# Patient Record
Sex: FEMALE | Race: WHITE | NOT HISPANIC OR LATINO | Employment: UNEMPLOYED | URBAN - METROPOLITAN AREA
[De-identification: names, ages, dates, MRNs, and addresses within clinical notes are randomized per-mention and may not be internally consistent; named-entity substitution may affect disease eponyms.]

---

## 2017-07-17 ENCOUNTER — HOSPITAL ENCOUNTER (EMERGENCY)
Facility: HOSPITAL | Age: 37
Discharge: HOME | End: 2017-07-18
Attending: EMERGENCY MEDICINE | Admitting: EMERGENCY MEDICINE
Payer: COMMERCIAL

## 2017-07-17 DIAGNOSIS — E03.9 HYPOTHYROIDISM: ICD-10-CM

## 2017-07-17 DIAGNOSIS — R60.0 PEDAL EDEMA: Primary | ICD-10-CM

## 2017-07-18 ENCOUNTER — APPOINTMENT (EMERGENCY)
Dept: RADIOLOGY | Facility: HOSPITAL | Age: 37
End: 2017-07-18
Payer: COMMERCIAL

## 2017-07-18 VITALS
HEIGHT: 61 IN | TEMPERATURE: 98.7 F | RESPIRATION RATE: 18 BRPM | OXYGEN SATURATION: 100 % | DIASTOLIC BLOOD PRESSURE: 88 MMHG | SYSTOLIC BLOOD PRESSURE: 137 MMHG | WEIGHT: 180 LBS | BODY MASS INDEX: 33.99 KG/M2 | HEART RATE: 70 BPM

## 2017-07-18 LAB
ALBUMIN SERPL BCP-MCNC: 3.6 G/DL (ref 3.5–5)
ALP SERPL-CCNC: 72 U/L (ref 46–116)
ALT SERPL W P-5'-P-CCNC: 29 U/L (ref 12–78)
ANION GAP SERPL CALCULATED.3IONS-SCNC: 2 MMOL/L (ref 4–13)
AST SERPL W P-5'-P-CCNC: 11 U/L (ref 5–45)
BASOPHILS # BLD AUTO: 0.03 THOUSANDS/ΜL (ref 0–0.1)
BASOPHILS NFR BLD AUTO: 0 % (ref 0–1)
BILIRUB SERPL-MCNC: 0.2 MG/DL (ref 0.2–1)
BILIRUB UR QL STRIP: NEGATIVE
BUN SERPL-MCNC: 9 MG/DL (ref 5–25)
CALCIUM SERPL-MCNC: 8.9 MG/DL (ref 8.3–10.1)
CHLORIDE SERPL-SCNC: 102 MMOL/L (ref 100–108)
CLARITY UR: NORMAL
CO2 SERPL-SCNC: 26 MMOL/L (ref 21–32)
COLOR UR: YELLOW
CREAT SERPL-MCNC: 0.71 MG/DL (ref 0.6–1.3)
DEPRECATED D DIMER PPP: 534 NG/ML (FEU) (ref 0–424)
EOSINOPHIL # BLD AUTO: 0.17 THOUSAND/ΜL (ref 0–0.61)
EOSINOPHIL NFR BLD AUTO: 2 % (ref 0–6)
ERYTHROCYTE [DISTWIDTH] IN BLOOD BY AUTOMATED COUNT: 13.2 % (ref 11.6–15.1)
GFR SERPL CREATININE-BSD FRML MDRD: >60 ML/MIN/1.73SQ M
GLUCOSE SERPL-MCNC: 102 MG/DL (ref 65–140)
GLUCOSE UR STRIP-MCNC: NEGATIVE MG/DL
HCG UR QL: NORMAL
HCT VFR BLD AUTO: 40.9 % (ref 34.8–46.1)
HGB BLD-MCNC: 13.2 G/DL (ref 11.5–15.4)
HGB UR QL STRIP.AUTO: NEGATIVE
KETONES UR STRIP-MCNC: NEGATIVE MG/DL
LEUKOCYTE ESTERASE UR QL STRIP: NEGATIVE
LYMPHOCYTES # BLD AUTO: 4.1 THOUSANDS/ΜL (ref 0.6–4.47)
LYMPHOCYTES NFR BLD AUTO: 37 % (ref 14–44)
MCH RBC QN AUTO: 28 PG (ref 26.8–34.3)
MCHC RBC AUTO-ENTMCNC: 32.3 G/DL (ref 31.4–37.4)
MCV RBC AUTO: 87 FL (ref 82–98)
MONOCYTES # BLD AUTO: 0.66 THOUSAND/ΜL (ref 0.17–1.22)
MONOCYTES NFR BLD AUTO: 6 % (ref 4–12)
NEUTROPHILS # BLD AUTO: 6.24 THOUSANDS/ΜL (ref 1.85–7.62)
NEUTS SEG NFR BLD AUTO: 56 % (ref 43–75)
NITRITE UR QL STRIP: NEGATIVE
NRBC BLD AUTO-RTO: 0 /100 WBCS
NT-PROBNP SERPL-MCNC: 88 PG/ML
PH UR STRIP.AUTO: 6 [PH] (ref 4.5–8)
PLATELET # BLD AUTO: 313 THOUSANDS/UL (ref 149–390)
PMV BLD AUTO: 10.5 FL (ref 8.9–12.7)
POTASSIUM SERPL-SCNC: 3.5 MMOL/L (ref 3.5–5.3)
PROT SERPL-MCNC: 7.1 G/DL (ref 6.4–8.2)
PROT UR STRIP-MCNC: NEGATIVE MG/DL
RBC # BLD AUTO: 4.71 MILLION/UL (ref 3.81–5.12)
SODIUM SERPL-SCNC: 130 MMOL/L (ref 136–145)
SP GR UR STRIP.AUTO: >=1.03 (ref 1–1.03)
T3 SERPL-MCNC: 1.4 NG/ML (ref 0.6–1.8)
T4 FREE SERPL-MCNC: 0.85 NG/DL (ref 0.76–1.46)
TROPONIN I SERPL-MCNC: <0.02 NG/ML
TSH SERPL DL<=0.05 MIU/L-ACNC: 8.32 UIU/ML (ref 0.36–3.74)
UROBILINOGEN UR QL STRIP.AUTO: 0.2 E.U./DL
WBC # BLD AUTO: 11.23 THOUSAND/UL (ref 4.31–10.16)

## 2017-07-18 PROCEDURE — 85379 FIBRIN DEGRADATION QUANT: CPT | Performed by: EMERGENCY MEDICINE

## 2017-07-18 PROCEDURE — 93005 ELECTROCARDIOGRAM TRACING: CPT | Performed by: EMERGENCY MEDICINE

## 2017-07-18 PROCEDURE — 71020 HB CHEST X-RAY 2VW FRONTAL&LATL: CPT

## 2017-07-18 PROCEDURE — 84443 ASSAY THYROID STIM HORMONE: CPT | Performed by: EMERGENCY MEDICINE

## 2017-07-18 PROCEDURE — 80053 COMPREHEN METABOLIC PANEL: CPT | Performed by: EMERGENCY MEDICINE

## 2017-07-18 PROCEDURE — 36415 COLL VENOUS BLD VENIPUNCTURE: CPT | Performed by: EMERGENCY MEDICINE

## 2017-07-18 PROCEDURE — 81025 URINE PREGNANCY TEST: CPT | Performed by: EMERGENCY MEDICINE

## 2017-07-18 PROCEDURE — 85025 COMPLETE CBC W/AUTO DIFF WBC: CPT | Performed by: EMERGENCY MEDICINE

## 2017-07-18 PROCEDURE — 84480 ASSAY TRIIODOTHYRONINE (T3): CPT | Performed by: EMERGENCY MEDICINE

## 2017-07-18 PROCEDURE — 84439 ASSAY OF FREE THYROXINE: CPT | Performed by: EMERGENCY MEDICINE

## 2017-07-18 PROCEDURE — 81003 URINALYSIS AUTO W/O SCOPE: CPT | Performed by: EMERGENCY MEDICINE

## 2017-07-18 PROCEDURE — 84484 ASSAY OF TROPONIN QUANT: CPT | Performed by: EMERGENCY MEDICINE

## 2017-07-18 PROCEDURE — 99284 EMERGENCY DEPT VISIT MOD MDM: CPT

## 2017-07-18 PROCEDURE — 83880 ASSAY OF NATRIURETIC PEPTIDE: CPT | Performed by: EMERGENCY MEDICINE

## 2017-07-18 RX ORDER — LEVOTHYROXINE SODIUM 137 UG/1
137 TABLET ORAL DAILY
Qty: 30 TABLET | Refills: 0 | Status: SHIPPED | OUTPATIENT
Start: 2017-07-18 | End: 2018-10-12 | Stop reason: HOSPADM

## 2017-07-18 RX ORDER — GINSENG 100 MG
1 CAPSULE ORAL ONCE
Status: DISCONTINUED | OUTPATIENT
Start: 2017-07-18 | End: 2017-07-18

## 2017-07-18 RX ORDER — POTASSIUM CHLORIDE 750 MG/1
10 TABLET, FILM COATED, EXTENDED RELEASE ORAL 2 TIMES DAILY
Qty: 20 TABLET | Refills: 0 | Status: SHIPPED | OUTPATIENT
Start: 2017-07-18 | End: 2018-07-17 | Stop reason: HOSPADM

## 2017-07-18 RX ORDER — FUROSEMIDE 20 MG/1
20 TABLET ORAL DAILY
Qty: 10 TABLET | Refills: 0 | Status: ON HOLD | OUTPATIENT
Start: 2017-07-18 | End: 2017-10-07

## 2017-07-18 RX ORDER — CEPHALEXIN 250 MG/5ML
500 POWDER, FOR SUSPENSION ORAL ONCE
Status: DISCONTINUED | OUTPATIENT
Start: 2017-07-18 | End: 2017-07-18

## 2017-07-21 ENCOUNTER — HOSPITAL ENCOUNTER (OUTPATIENT)
Dept: ULTRASOUND IMAGING | Facility: HOSPITAL | Age: 37
Discharge: HOME | End: 2017-07-21
Attending: EMERGENCY MEDICINE
Payer: COMMERCIAL

## 2017-07-21 DIAGNOSIS — R60.0 PEDAL EDEMA: ICD-10-CM

## 2017-07-21 PROCEDURE — 93970 EXTREMITY STUDY: CPT

## 2017-07-22 LAB
ATRIAL RATE: 69 BPM
P AXIS: 65 DEGREES
PR INTERVAL: 150 MS
QRS AXIS: 88 DEGREES
QRSD INTERVAL: 66 MS
QT INTERVAL: 410 MS
QTC INTERVAL: 439 MS
T WAVE AXIS: 79 DEGREES
VENTRICULAR RATE: 69 BPM

## 2017-10-07 ENCOUNTER — APPOINTMENT (EMERGENCY)
Dept: CT IMAGING | Facility: HOSPITAL | Age: 37
DRG: 058 | End: 2017-10-07
Payer: COMMERCIAL

## 2017-10-07 ENCOUNTER — HOSPITAL ENCOUNTER (INPATIENT)
Facility: HOSPITAL | Age: 37
LOS: 3 days | Discharge: HOME | DRG: 058 | End: 2017-10-10
Attending: EMERGENCY MEDICINE | Admitting: FAMILY MEDICINE
Payer: COMMERCIAL

## 2017-10-07 DIAGNOSIS — R20.0 LEFT SIDED NUMBNESS: Primary | ICD-10-CM

## 2017-10-07 DIAGNOSIS — I67.5 MOYAMOYA DISEASE: ICD-10-CM

## 2017-10-07 DIAGNOSIS — Z86.73 HISTORY OF CVA (CEREBROVASCULAR ACCIDENT): ICD-10-CM

## 2017-10-07 PROBLEM — Z72.0 TOBACCO USE: Status: ACTIVE | Noted: 2017-10-07

## 2017-10-07 PROBLEM — D72.829 LEUKOCYTOSIS: Status: ACTIVE | Noted: 2017-10-07

## 2017-10-07 LAB
ALBUMIN SERPL BCP-MCNC: 3.9 G/DL (ref 3.5–5)
ALP SERPL-CCNC: 93 U/L (ref 46–116)
ALT SERPL W P-5'-P-CCNC: 23 U/L (ref 12–78)
ANION GAP SERPL CALCULATED.3IONS-SCNC: 12 MMOL/L (ref 4–13)
AST SERPL W P-5'-P-CCNC: 11 U/L (ref 5–45)
BACTERIA UR QL AUTO: ABNORMAL /HPF
BASOPHILS # BLD AUTO: 0.04 THOUSANDS/ΜL (ref 0–0.1)
BASOPHILS NFR BLD AUTO: 0 % (ref 0–1)
BILIRUB SERPL-MCNC: 0.3 MG/DL (ref 0.2–1)
BILIRUB UR QL STRIP: ABNORMAL
BUN SERPL-MCNC: 9 MG/DL (ref 5–25)
CALCIUM SERPL-MCNC: 9 MG/DL (ref 8.3–10.1)
CHLORIDE SERPL-SCNC: 102 MMOL/L (ref 100–108)
CLARITY UR: CLEAR
CO2 SERPL-SCNC: 25 MMOL/L (ref 21–32)
COLOR UR: ABNORMAL
CREAT SERPL-MCNC: 0.83 MG/DL (ref 0.6–1.3)
EOSINOPHIL # BLD AUTO: 0.16 THOUSAND/ΜL (ref 0–0.61)
EOSINOPHIL NFR BLD AUTO: 1 % (ref 0–6)
ERYTHROCYTE [DISTWIDTH] IN BLOOD BY AUTOMATED COUNT: 12.8 % (ref 11.6–15.1)
GFR SERPL CREATININE-BSD FRML MDRD: 89 ML/MIN/1.73SQ M
GLUCOSE SERPL-MCNC: 139 MG/DL (ref 65–140)
GLUCOSE UR STRIP-MCNC: NEGATIVE MG/DL
HCT VFR BLD AUTO: 42.1 % (ref 34.8–46.1)
HGB BLD-MCNC: 13.8 G/DL (ref 11.5–15.4)
HGB UR QL STRIP.AUTO: ABNORMAL
INR PPP: 0.89 (ref 0.86–1.16)
KETONES UR STRIP-MCNC: ABNORMAL MG/DL
LEUKOCYTE ESTERASE UR QL STRIP: NEGATIVE
LYMPHOCYTES # BLD AUTO: 4.33 THOUSANDS/ΜL (ref 0.6–4.47)
LYMPHOCYTES NFR BLD AUTO: 38 % (ref 14–44)
MCH RBC QN AUTO: 28 PG (ref 26.8–34.3)
MCHC RBC AUTO-ENTMCNC: 32.8 G/DL (ref 31.4–37.4)
MCV RBC AUTO: 86 FL (ref 82–98)
MONOCYTES # BLD AUTO: 0.41 THOUSAND/ΜL (ref 0.17–1.22)
MONOCYTES NFR BLD AUTO: 4 % (ref 4–12)
NEUTROPHILS # BLD AUTO: 6.3 THOUSANDS/ΜL (ref 1.85–7.62)
NEUTS SEG NFR BLD AUTO: 56 % (ref 43–75)
NITRITE UR QL STRIP: NEGATIVE
NON-SQ EPI CELLS URNS QL MICRO: ABNORMAL /HPF
NRBC BLD AUTO-RTO: 0 /100 WBCS
PH UR STRIP.AUTO: 5.5 [PH] (ref 4.5–8)
PLATELET # BLD AUTO: 379 THOUSANDS/UL (ref 149–390)
PMV BLD AUTO: 10.3 FL (ref 8.9–12.7)
POTASSIUM SERPL-SCNC: 3.6 MMOL/L (ref 3.5–5.3)
PROT SERPL-MCNC: 7.5 G/DL (ref 6.4–8.2)
PROT UR STRIP-MCNC: ABNORMAL MG/DL
PROTHROMBIN TIME: 12.2 SECONDS (ref 12.1–14.4)
RBC # BLD AUTO: 4.92 MILLION/UL (ref 3.81–5.12)
RBC #/AREA URNS AUTO: ABNORMAL /HPF
SODIUM SERPL-SCNC: 139 MMOL/L (ref 136–145)
SP GR UR STRIP.AUTO: >=1.03 (ref 1–1.03)
UROBILINOGEN UR QL STRIP.AUTO: 0.2 E.U./DL
WBC # BLD AUTO: 11.29 THOUSAND/UL (ref 4.31–10.16)
WBC #/AREA URNS AUTO: ABNORMAL /HPF

## 2017-10-07 PROCEDURE — 99285 EMERGENCY DEPT VISIT HI MDM: CPT

## 2017-10-07 PROCEDURE — 80053 COMPREHEN METABOLIC PANEL: CPT | Performed by: EMERGENCY MEDICINE

## 2017-10-07 PROCEDURE — 36415 COLL VENOUS BLD VENIPUNCTURE: CPT | Performed by: EMERGENCY MEDICINE

## 2017-10-07 PROCEDURE — 70496 CT ANGIOGRAPHY HEAD: CPT

## 2017-10-07 PROCEDURE — 81001 URINALYSIS AUTO W/SCOPE: CPT | Performed by: EMERGENCY MEDICINE

## 2017-10-07 PROCEDURE — 85610 PROTHROMBIN TIME: CPT | Performed by: EMERGENCY MEDICINE

## 2017-10-07 PROCEDURE — 70498 CT ANGIOGRAPHY NECK: CPT

## 2017-10-07 PROCEDURE — 93005 ELECTROCARDIOGRAM TRACING: CPT | Performed by: EMERGENCY MEDICINE

## 2017-10-07 PROCEDURE — 85025 COMPLETE CBC W/AUTO DIFF WBC: CPT | Performed by: EMERGENCY MEDICINE

## 2017-10-07 RX ORDER — PAROXETINE HYDROCHLORIDE 20 MG/1
20 TABLET, FILM COATED ORAL DAILY
Status: DISCONTINUED | OUTPATIENT
Start: 2017-10-08 | End: 2017-10-10 | Stop reason: HOSPADM

## 2017-10-07 RX ORDER — GABAPENTIN 300 MG/1
300 CAPSULE ORAL 3 TIMES DAILY
Status: DISCONTINUED | OUTPATIENT
Start: 2017-10-07 | End: 2017-10-10 | Stop reason: HOSPADM

## 2017-10-07 RX ORDER — NICOTINE 21 MG/24HR
1 PATCH, TRANSDERMAL 24 HOURS TRANSDERMAL DAILY
Status: DISCONTINUED | OUTPATIENT
Start: 2017-10-08 | End: 2017-10-08

## 2017-10-07 RX ORDER — DOXEPIN HYDROCHLORIDE 25 MG/1
25 CAPSULE ORAL
Status: DISCONTINUED | OUTPATIENT
Start: 2017-10-07 | End: 2017-10-10 | Stop reason: HOSPADM

## 2017-10-07 RX ORDER — ATORVASTATIN CALCIUM 40 MG/1
40 TABLET, FILM COATED ORAL EVERY EVENING
Status: DISCONTINUED | OUTPATIENT
Start: 2017-10-07 | End: 2017-10-10 | Stop reason: HOSPADM

## 2017-10-07 RX ORDER — ASPIRIN AND DIPYRIDAMOLE 25; 200 MG/1; MG/1
1 CAPSULE, EXTENDED RELEASE ORAL EVERY 12 HOURS SCHEDULED
Status: DISCONTINUED | OUTPATIENT
Start: 2017-10-07 | End: 2017-10-10 | Stop reason: HOSPADM

## 2017-10-07 RX ORDER — ACETAMINOPHEN 325 MG/1
650 TABLET ORAL EVERY 4 HOURS PRN
Status: DISCONTINUED | OUTPATIENT
Start: 2017-10-07 | End: 2017-10-10 | Stop reason: HOSPADM

## 2017-10-07 RX ADMIN — IOHEXOL 85 ML: 350 INJECTION, SOLUTION INTRAVENOUS at 18:31

## 2017-10-07 NOTE — ED PROVIDER NOTES
History  Chief Complaint   Patient presents with    CVA/TIA-like Symptoms     Pt to ED with complaints of numbess on left side of face/ left sided weakness  States has a history of  Vic Griffin  Symptoms started around 10 pm last night  73-year-old female patient presents emergency department chief complaint of left-sided weakness  The patient states she has a history of moyamoya disease, has had revascularization done, and started having symptoms sometime last night but wanted to nap with her boyfriend prior to coming to the emergency department so stayed home until this afternoon and then took several more hours to get a ride here to the emergency department for evaluation  Currently, physical exam, the patient has no abnormalities  The patient's sensation of weakness is not replicated the patient's neurologic exam   The patient's NIH stroke scale is 0  The patient's right scale 0 as well with no facial palsy, no are motor impairment, no leg motor impairment, no head or gaze deviation  Patient also has no hemiparesis  Patient be evaluated with a differential diagnosis to include but not be limited to TIA, CVA, myalgias  History provided by:  Patient   used: No    CVA/TIA-like Symptoms   Presenting symptoms: incoordination    Onset quality:  Gradual  Duration:  18 hours  Timing:  Constant  Progression:  Worsening  Similar to previous episodes: yes    Associated symptoms: no chest pain, no trouble swallowing, no dizziness, no fever, no hearing loss, no bladder incontinence, no paresthesias, no seizures and no tinnitus        Prior to Admission Medications   Prescriptions Last Dose Informant Patient Reported?  Taking?   furosemide (LASIX) 20 mg tablet   No No   Sig: Take 1 tablet by mouth daily   levothyroxine 137 mcg tablet   No No   Sig: Take 1 tablet by mouth daily   potassium chloride (K-DUR) 10 mEq tablet   No No   Sig: Take 1 tablet by mouth 2 (two) times a day Facility-Administered Medications: None       History reviewed  No pertinent past medical history  Past Surgical History:   Procedure Laterality Date    BACK SURGERY         History reviewed  No pertinent family history  I have reviewed and agree with the history as documented  Social History   Substance Use Topics    Smoking status: Never Smoker    Smokeless tobacco: Never Used    Alcohol use No        Review of Systems   Constitutional: Negative for fever  HENT: Negative for hearing loss, tinnitus and trouble swallowing  Cardiovascular: Negative for chest pain  Genitourinary: Negative for bladder incontinence  Neurological: Positive for disturbances in coordination  Negative for dizziness, seizures and paresthesias  All other systems reviewed and are negative  Physical Exam  ED Triage Vitals [10/07/17 1731]   Temp Pulse Respirations Blood Pressure SpO2   -- 87 18 141/93 100 %      Temp Source Heart Rate Source Patient Position - Orthostatic VS BP Location FiO2 (%)   Oral Monitor Lying Right arm --      Pain Score       No Pain           Physical Exam   Constitutional: She is oriented to person, place, and time  She appears well-developed and well-nourished  HENT:   Head: Normocephalic and atraumatic  Right Ear: External ear normal    Left Ear: External ear normal    Eyes: Conjunctivae and EOM are normal    Neck: No JVD present  No tracheal deviation present  No thyromegaly present  Cardiovascular: Normal rate  Pulmonary/Chest: Effort normal and breath sounds normal  No stridor  Abdominal: Soft  She exhibits no distension and no mass  There is no tenderness  There is no guarding  No hernia  Musculoskeletal: Normal range of motion  She exhibits no edema, tenderness or deformity  Lymphadenopathy:     She has no cervical adenopathy  Neurological: She is alert and oriented to person, place, and time  Skin: Skin is warm  No rash noted  No erythema  No pallor  Psychiatric: She has a normal mood and affect  Her behavior is normal    Nursing note and vitals reviewed  ED Medications  Medications - No data to display    Diagnostic Studies  Labs Reviewed - No data to display    No orders to display       Procedures  Procedures      Phone Contacts  ED Phone Contact    ED Course  ED Course                                MDM  Number of Diagnoses or Management Options  Left sided numbness: new and requires workup     Amount and/or Complexity of Data Reviewed  Clinical lab tests: reviewed and ordered  Tests in the radiology section of CPT®: ordered and reviewed  Decide to obtain previous medical records or to obtain history from someone other than the patient: yes  Review and summarize past medical records: yes    Patient Progress  Patient progress: stable    The patient presented with a condition in which there was a high probability of imminent or life-threatening deterioration, and critical care services (excluding separately billable procedures) totalled 30-74 minutes  Disposition  Final diagnoses:   None     ED Disposition     None      Follow-up Information    None       Patient's Medications   Discharge Prescriptions    No medications on file     No discharge procedures on file      ED Provider  Electronically Signed by       Arianne Coleman, DO  10/09/17 [address] K[address], DO  10/09/17 2204

## 2017-10-07 NOTE — ED NOTES
Pt currently on her menses  MD notified         Ace Herman, RN  10/07/17 [address] [place] [address] Brett Ch RN  10/07/17 Cristina Bowling

## 2017-10-08 ENCOUNTER — APPOINTMENT (INPATIENT)
Dept: NON INVASIVE DIAGNOSTICS | Facility: HOSPITAL | Age: 37
DRG: 058 | End: 2017-10-08
Payer: COMMERCIAL

## 2017-10-08 ENCOUNTER — APPOINTMENT (INPATIENT)
Dept: MRI IMAGING | Facility: HOSPITAL | Age: 37
DRG: 058 | End: 2017-10-08
Payer: COMMERCIAL

## 2017-10-08 ENCOUNTER — APPOINTMENT (INPATIENT)
Dept: RADIOLOGY | Facility: HOSPITAL | Age: 37
DRG: 058 | End: 2017-10-08
Payer: COMMERCIAL

## 2017-10-08 LAB
ANION GAP SERPL CALCULATED.3IONS-SCNC: 10 MMOL/L (ref 4–13)
APTT PPP: 27 SECONDS (ref 23–35)
BUN SERPL-MCNC: 9 MG/DL (ref 5–25)
CALCIUM SERPL-MCNC: 9 MG/DL (ref 8.3–10.1)
CHLORIDE SERPL-SCNC: 103 MMOL/L (ref 100–108)
CHOLEST SERPL-MCNC: 253 MG/DL (ref 50–200)
CO2 SERPL-SCNC: 26 MMOL/L (ref 21–32)
CREAT SERPL-MCNC: 0.73 MG/DL (ref 0.6–1.3)
CRP SERPL QL: 6.6 MG/L
ERYTHROCYTE [DISTWIDTH] IN BLOOD BY AUTOMATED COUNT: 12.7 % (ref 11.6–15.1)
EST. AVERAGE GLUCOSE BLD GHB EST-MCNC: 131 MG/DL
GFR SERPL CREATININE-BSD FRML MDRD: 104 ML/MIN/1.73SQ M
GLUCOSE SERPL-MCNC: 116 MG/DL (ref 65–140)
HBA1C MFR BLD: 6.2 % (ref 4.2–6.3)
HCT VFR BLD AUTO: 38.6 % (ref 34.8–46.1)
HCYS SERPL-SCNC: 8 UMOL/L (ref 3.7–11.2)
HDLC SERPL-MCNC: 26 MG/DL (ref 40–60)
HGB BLD-MCNC: 12.7 G/DL (ref 11.5–15.4)
INR PPP: 0.89 (ref 0.86–1.16)
LDLC SERPL CALC-MCNC: 186 MG/DL (ref 0–100)
MCH RBC QN AUTO: 28 PG (ref 26.8–34.3)
MCHC RBC AUTO-ENTMCNC: 32.9 G/DL (ref 31.4–37.4)
MCV RBC AUTO: 85 FL (ref 82–98)
PLATELET # BLD AUTO: 315 THOUSANDS/UL (ref 149–390)
PMV BLD AUTO: 10.1 FL (ref 8.9–12.7)
POTASSIUM SERPL-SCNC: 3.6 MMOL/L (ref 3.5–5.3)
PROTHROMBIN TIME: 12.2 SECONDS (ref 12.1–14.4)
RBC # BLD AUTO: 4.53 MILLION/UL (ref 3.81–5.12)
SODIUM SERPL-SCNC: 139 MMOL/L (ref 136–145)
TRIGL SERPL-MCNC: 206 MG/DL
TSH SERPL DL<=0.05 MIU/L-ACNC: 4.8 UIU/ML (ref 0.36–3.74)
WBC # BLD AUTO: 11.98 THOUSAND/UL (ref 4.31–10.16)

## 2017-10-08 PROCEDURE — 86140 C-REACTIVE PROTEIN: CPT | Performed by: PHYSICIAN ASSISTANT

## 2017-10-08 PROCEDURE — 80048 BASIC METABOLIC PNL TOTAL CA: CPT | Performed by: PHYSICIAN ASSISTANT

## 2017-10-08 PROCEDURE — 85027 COMPLETE CBC AUTOMATED: CPT | Performed by: PHYSICIAN ASSISTANT

## 2017-10-08 PROCEDURE — 93306 TTE W/DOPPLER COMPLETE: CPT

## 2017-10-08 PROCEDURE — 83036 HEMOGLOBIN GLYCOSYLATED A1C: CPT | Performed by: PHYSICIAN ASSISTANT

## 2017-10-08 PROCEDURE — 80061 LIPID PANEL: CPT | Performed by: PHYSICIAN ASSISTANT

## 2017-10-08 PROCEDURE — 83090 ASSAY OF HOMOCYSTEINE: CPT | Performed by: PHYSICIAN ASSISTANT

## 2017-10-08 PROCEDURE — 70551 MRI BRAIN STEM W/O DYE: CPT

## 2017-10-08 PROCEDURE — 85730 THROMBOPLASTIN TIME PARTIAL: CPT | Performed by: PHYSICIAN ASSISTANT

## 2017-10-08 PROCEDURE — 84443 ASSAY THYROID STIM HORMONE: CPT | Performed by: PHYSICIAN ASSISTANT

## 2017-10-08 PROCEDURE — 71020 HB CHEST X-RAY 2VW FRONTAL&LATL: CPT

## 2017-10-08 PROCEDURE — 85610 PROTHROMBIN TIME: CPT | Performed by: PHYSICIAN ASSISTANT

## 2017-10-08 RX ORDER — NICOTINE 21 MG/24HR
1 PATCH, TRANSDERMAL 24 HOURS TRANSDERMAL DAILY
Status: DISCONTINUED | OUTPATIENT
Start: 2017-10-08 | End: 2017-10-10 | Stop reason: HOSPADM

## 2017-10-08 RX ADMIN — ASPIRIN AND DIPYRIDAMOLE 1 CAPSULE: 25; 200 CAPSULE, EXTENDED RELEASE ORAL at 00:17

## 2017-10-08 RX ADMIN — PAROXETINE HYDROCHLORIDE 20 MG: 20 TABLET, FILM COATED ORAL at 08:12

## 2017-10-08 RX ADMIN — NICOTINE 1 PATCH: 21 PATCH, EXTENDED RELEASE TRANSDERMAL at 08:12

## 2017-10-08 RX ADMIN — GABAPENTIN 300 MG: 300 CAPSULE ORAL at 18:03

## 2017-10-08 RX ADMIN — GABAPENTIN 300 MG: 300 CAPSULE ORAL at 21:27

## 2017-10-08 RX ADMIN — NICOTINE 1 PATCH: 21 PATCH, EXTENDED RELEASE TRANSDERMAL at 00:18

## 2017-10-08 RX ADMIN — ATORVASTATIN CALCIUM 40 MG: 40 TABLET, FILM COATED ORAL at 18:03

## 2017-10-08 RX ADMIN — GABAPENTIN 300 MG: 300 CAPSULE ORAL at 00:18

## 2017-10-08 RX ADMIN — DOXEPIN HYDROCHLORIDE 25 MG: 25 CAPSULE ORAL at 21:28

## 2017-10-08 RX ADMIN — ASPIRIN AND DIPYRIDAMOLE 1 CAPSULE: 25; 200 CAPSULE, EXTENDED RELEASE ORAL at 21:27

## 2017-10-08 RX ADMIN — ASPIRIN AND DIPYRIDAMOLE 1 CAPSULE: 25; 200 CAPSULE, EXTENDED RELEASE ORAL at 08:11

## 2017-10-08 RX ADMIN — ACETAMINOPHEN 650 MG: 325 TABLET ORAL at 01:11

## 2017-10-08 RX ADMIN — ENOXAPARIN SODIUM 40 MG: 40 INJECTION SUBCUTANEOUS at 08:11

## 2017-10-08 RX ADMIN — GABAPENTIN 300 MG: 300 CAPSULE ORAL at 08:11

## 2017-10-08 RX ADMIN — ATORVASTATIN CALCIUM 40 MG: 40 TABLET, FILM COATED ORAL at 00:18

## 2017-10-08 NOTE — CONSULTS
Consultation - Cardiology    Rebecca Jennings 42 y o  female MRN: [id number]  Unit/Bed#: -01 Encounter: [id number]    Physician Requesting Consult: Agnes Ceja MD    Reason for Consult / Principal Problem:  CVA, left-sided numbness    HPI: Rebecca Jennings is a 42y o  year old female with a past medical history significant for CVA, moyamoya disease, tobacco use  Patient came to [facility] Emergency Room on 10/7/2017 in the afternoon with complaints left vision loss and facial numbness  She states she has history of similar complaints and knows that it is part of her disease, Moyamoya  She states she thought should come in to get checked out  She states she had brain surgery back in April at [facility] and just all brain surgeon recently  She had been feeling well  She states she had aphasia after brain surgery but it was resolved  Currently states she is feeling well has no complaints  She denies that she had chest pain, chest pressure, chest heaviness, shortness of breath  She denies this personal history of CAD  She is a smoker  She does have a family history of CAD in her father, who had MI and  at age 64  Historical Information   Past Medical History:   Diagnosis Date    History of CVA (cerebrovascular accident)     Moyamoya disease     Tobacco use      Past Surgical History:   Procedure Laterality Date    BACK SURGERY      BRAIN SURGERY       History   Alcohol Use No     History   Drug Use No     History   Smoking Status    Current Every Day Smoker    Packs/day: 1 00    Types: E-Cigarettes   Smokeless Tobacco    Never Used       FAMILY HISTORY:  History reviewed  No pertinent family history      MEDS & ALLERGIES:  all current active meds have been reviewed and current meds:   Current Facility-Administered Medications   Medication Dose Route Frequency    acetaminophen (TYLENOL) tablet 650 mg  650 mg Oral Q4H PRN    aspirin-dipyridamole (AGGRENOX)  mg per 12 hr capsule 1 capsule  1 capsule Oral Q12H [address]    atorvastatin (LIPITOR) tablet 40 mg  40 mg Oral QPM    doxepin (SINEquan) capsule 25 mg  25 mg Oral HS    enoxaparin (LOVENOX) subcutaneous injection 40 mg  40 mg Subcutaneous Daily    gabapentin (NEURONTIN) capsule 300 mg  300 mg Oral TID    nicotine (NICODERM CQ) 21 mg/24 hr TD 24 hr patch 1 patch  1 patch Transdermal Daily    PARoxetine (PAXIL) tablet 20 mg  20 mg Oral Daily        No Known Allergies      REVIEW OF SYSTEMS:  Constitutional: Denies fever or chills  Eyes: Denies eye discharge or change in visual acuity   HENT: Denies sneezing, nasal congestion or sore throat   Respiratory: Denies cough, expectoration or shortness of breath   Cardiovascular: Denies chest pain, palpitations or lower extremity swelling   GI: Denies abdominal pain, nausea, vomiting, bloody stools or diarrhea   : Denies dysuria, frequency, difficulty in micturition or nocturia  Musculoskeletal: Denies back pain or joint pain   Neurologic: + left eye vision loss , left-sided facial numbness  Denies lightheadedness, syncope, headache, focal weakness   Endocrine: Denies polyuria or polydipsia   Lymphatic: Denies swollen glands   Psychiatric: Denies depression, anxiety or panic       PHYSICAL EXAM:  Vitals:   Vitals:    10/08/17 1100   BP: 137/91   Pulse: 72   Resp: 18   Temp: 98 4 °F (36 9 °C)   SpO2: 98%     Body mass index is 35 07 kg/m²      Systolic (84IBN), FLN:364 , Min:121 , WSI:765     Diastolic (05VPW), FCT:65, Min:66, Max:93      Intake/Output Summary (Last 24 hours) at 10/08/17 1235  Last data filed at 10/08/17 0800   Gross per 24 hour   Intake              120 ml   Output                0 ml   Net              120 ml     Weight (last 2 days)     Date/Time   Weight    10/07/17 2303  84 2 (185 63)    10/07/17 1731  84 1 (185 41)            Invasive Devices     Peripheral Intravenous Line            Peripheral IV 10/07/17 Right Antecubital less than 1 day                General: Patient is not in acute distress  Laying comfortably in the bed  Awake, alert, responding to commands  Head: Normocephalic  Atraumatic  HEENT: Both pupils normal sized, round and reactive to light  Nonicteric  Neck: JVP not elevated  Trachea central  No thyromegaly  Respiratory: Bilateral bronchovascular breath sounds with no crackles or rhonchi  Cardiovascular: RRR  S1 and S2 normal  No murmur, rub or gallop  GI: Abdomen soft, nontender  No guarding or rigidity  Liver and spleen not palpable  Neurologic: Patient is awake, alert, responding to commands  Well-oriented to time, place and person   Moving all extremities  Extremities: No clubbing, cyanosis or edema  Integument: No skin rashes or ulceration  Lymphatic: No cervical lymphadenopathy      LABORATORY RESULTS:        CBC with diff:   Results from last 7 days  Lab Units 10/08/17  0434 10/07/17  1751   WBC Thousand/uL 11 98* 11 29*   HEMOGLOBIN g/dL 12 7 13 8   HEMATOCRIT % 38 6 42 1   MCV fL 85 86   PLATELETS Thousands/uL 315 379   MCH pg 28 0 28 0   MCHC g/dL 32 9 32 8   RDW % 12 7 12 8   MPV fL 10 1 10 3   NRBC AUTO /100 WBCs  --  0       CMP:  Results from last 7 days  Lab Units 10/08/17  0434 10/07/17  1751   SODIUM mmol/L 139 139   POTASSIUM mmol/L 3 6 3 6   CHLORIDE mmol/L 103 102   CO2 mmol/L 26 25   ANION GAP mmol/L 10 12   BUN mg/dL 9 9   CREATININE mg/dL 0 73 0 83   GLUCOSE RANDOM mg/dL 116 139   CALCIUM mg/dL 9 0 9 0   AST U/L  --  11   ALT U/L  --  23   ALK PHOS U/L  --  93   TOTAL PROTEIN g/dL  --  7 5   ALBUMIN g/dL  --  3 9   BILIRUBIN TOTAL mg/dL  --  0 30   EGFR ml/min/1 73sq m 104 89       BMP:  Results from last 7 days  Lab Units 10/08/17  0434 10/07/17  1751   SODIUM mmol/L 139 139   POTASSIUM mmol/L 3 6 3 6   CHLORIDE mmol/L 103 102   CO2 mmol/L 26 25   BUN mg/dL 9 9   CREATININE mg/dL 0 73 0 83   GLUCOSE RANDOM mg/dL 116 139   CALCIUM mg/dL 9 0 9 0                      Results from last 7 days  Lab Units 10/08/17  0434   TSH 3RD GENERATON uIU/mL 4 802*       Results from last 7 days  Lab Units 10/08/17  0434 10/07/17  1752   INR  0 89 0 89       Lipid Profile:   Lab Results   Component Value Date    CHOL 253 (H) 10/08/2017     Lab Results   Component Value Date    HDL 26 (L) 10/08/2017     Lab Results   Component Value Date    LDLCALC 186 (H) 10/08/2017     Lab Results   Component Value Date    TRIG 206 (H) 10/08/2017         Imaging: I have personally reviewed pertinent reports  Procedure: Cta Head And Neck With And Without Contrast    Result Date: 10/7/2017  Narrative: CTA NECK AND BRAIN WITH AND WITHOUT CONTRAST INDICATION:    left facial numbness  Left-sided weakness  History of moyamoya disease  COMPARISON:   None  TECHNIQUE:  Routine CT imaging of the Brain without contrast   Post contrast imaging was performed after administration of iodinated contrast through the neck and brain  Post contrast axial 0 625 mm images timed to opacify the arterial system  3D rendering was performed on an independent workstation  MIP reconstructions performed  Coronal reconstructions were performed of the noncontrast portion of the brain  Radiation dose length product (DLP) for this visit:  1334 mGy-cm   This examination, like all CT scans performed in the [facility], was performed utilizing techniques to minimize radiation dose exposure, including the use of iterative reconstruction and automated exposure control  IV Contrast:  85 mL of iohexol (OMNIPAQUE)     IMAGE QUALITY:   Diagnostic FINDINGS: NONCONTRAST BRAIN PARENCHYMA:  Mild diffuse cerebral volume loss  Mild scattered white matter change consistent with chronic microangiopathy  Old infarction identified within the right posterior temporal lobe and occipital lobe extending superiorly into the parietal lobe  Normal basilar cisterns, brainstem and cerebellum  No hemorrhage       VENTRICLES AND EXTRA-AXIAL SPACES:  Normal for patient's age  Killian Handsome SINUSES:  Unremarkable  CALVARIUM AND EXTRACRANIAL SOFT TISSUES:   Normal  CERVICAL VASCULATURE AORTIC ARCH AND GREAT VESSELS:  Normal aortic arch and great vessel origins  Normal visualized subclavian vessels  RIGHT VERTEBRAL ARTERY CERVICAL SEGMENT:  Normal origin  The vessel is normal in caliber throughout the neck  LEFT VERTEBRAL ARTERY CERVICAL SEGMENT:  Normal origin  The vessel is normal in caliber throughout the neck  RIGHT EXTRACRANIAL CAROTID SEGMENT:  Normal common carotid artery  Evaluation of the bifurcation demonstrates no focal stenosis of the origin of the internal carotid artery  There is abrupt tapering of the right internal carotid artery beginning within  the distal aspect of the bulb and extending throughout the cervical segment  The vessel is diffusely decreased in caliber from the distal aspect of the bulb to the skull base measuring only approximately 2 mm in diameter  LEFT EXTRACRANIAL CAROTID SEGMENT:  Normal common carotid artery  The origin of the internal carotid artery is unremarkable  Similar to the opposite right side there is abrupt smooth narrowing of the internal carotid artery bulb distally and the entire  remaining left cervical internal carotid artery to the skull base demonstrates diffusely decreased caliber measuring only 2 mm  NASCET criteria was used to determine the degree of internal carotid artery diameter stenosis  INTRACRANIAL VASCULATURE INTERNAL CAROTID ARTERIES:  Both intracranial internal carotid arteries are small in size  There is severe stenosis of the distal cavernous segments with occlusion of both internal carotid arteries  ANTERIOR CIRCULATION:  Both A1 segments are severely hypoplastic but patent  The AP 2 segments are patent proximally but abruptly occludes with multiple collaterals seen within the anterior cerebral artery distribution   MIDDLE CEREBRAL ARTERY CIRCULATION:  There is occlusion of the right M1 segment with multiple collaterals surrounding the M1 segment and extending into the middle cerebral artery territory bilaterally, especially the lenticulostriate vessels within the basal ganglia  The left distal M1 segment is hypoplastic but patent  Similar to the opposite right side there are multiple collateral vessels throughout the middle cerebral artery territory most pronounced are the lenticulostriates within the basal ganglia  DISTAL VERTEBRAL ARTERIES:  Normal distal vertebral arteries  Posterior inferior cerebellar artery origins are normal  Normal vertebral basilar junction  BASILAR ARTERY:  Basilar artery is normal in caliber  Normal superior cerebellar arteries  POSTERIOR CEREBRAL ARTERIES: The right posterior cerebral artery arises from the basilar tip with a normal P1 segment  However, the P2 segment is diffusely markedly decreased in caliber  The left P1 segment arises from the basilar tip  There is a posterior cerebral artery which appears to be arising in the region of the occluded left internal carotid artery and extending into the paramesencephalic cistern  DURAL VENOUS SINUSES:  Poor visualization of the dural venous sinuses  No evidence of occlusion  NON VASCULAR ANATOMY BONY STRUCTURES:  No acute osseous abnormality  Previous right hemispheric craniotomy involving the squamosal temporal bone  SOFT TISSUES OF THE NECK:  Normal         THORACIC INLET:  Unremarkable  Impression: Normal bifurcations  However, the cervical internal carotid arteries abruptly, markedly narrow at the distal aspect of the bulb and are diffusely narrowed throughout the neck  The intracranial internal carotid arteries are narrow as well, becoming severe at the level of the anterior aspect of the cavernous segments with occlusion at the ICA terminus  The anterior and middle cerebral artery territories demonstrate multiple collateral vessels with hypoplastic or occluded A1 and M1 segments    The collateral vessels are seen throughout the anterior and middle cerebral artery territories consistent with the provided history of moyamoya disease  Normal vertebral arteries and vertebrobasilar system  The right posterior cerebral artery arises from the basilar tip but becomes diffusely significantly narrowed at the level of the P2 segment  Unfortunately there are no prior examinations at this institution for comparison  Previous right squamosal craniotomy  Examination of the brain demonstrates old infarction within the posterior temporal lobe, occipital lobe and parietal lobe with mild atrophy and scattered chronic microangiopathic change  No definite territorial infarction identified at this time  Consider MRI with diffusion imaging if there is concern for acute infarction  No hemorrhage  ##[email or web address]##imslh    Workstation performed: [id number]     Procedure: Xr Chest Pa & Lateral    Result Date: 10/8/2017  Narrative: CHEST 2 View INDICATION:  Leukocytosis  Smoker  COMPARISON:  7/18/2017 VIEWS:  PA and lateral projections; 2 images FINDINGS:     Cardiomediastinal silhouette appears unremarkable  The lungs are clear  No pneumothorax or pleural effusion  Visualized osseous structures appear within normal limits for the patient's age  Impression: No active pulmonary disease  Workstation performed: [id number]     Procedure: Mri Brain Wo Contrast    Result Date: 10/8/2017  Narrative: MRI BRAIN WITHOUT CONTRAST INDICATION:  History of several CVAs  Moyamoya disease  Left-sided facial numbness  Left-sided eye pain with slight visual loss  COMPARISON:   CTA performed one day prior  TECHNIQUE:  Sagittal T1, axial T2, axial FLAIR, axial T1, axial [place] and axial diffusion imaging  IMAGE QUALITY:  Diagnostic  FINDINGS: BRAIN PARENCHYMA:  Sequela of prior infarct with chronic blood products in the right PCA distribution in the right temporal occipital lobe    There are foci of T2 and FLAIR signal abnormality in the periventricular and subcortical white matter which are typical for microvascular disease in patients of this age group  There is no mass, mass effect, midline shift  No midline shift  VENTRICLES:  The ventricles are normal in size and contour  SELLA AND PITUITARY GLAND:  Normal  ORBITS:  Normal  PARANASAL SINUSES:  Normal  VASCULATURE:  Flow-voids the skull base are hypoplastic  CALVARIUM AND SKULL BASE:  Normal  EXTRACRANIAL SOFT TISSUES:  Normal      Impression: No acute intracranial abnormality  Chronic right posterior cerebral artery infarct  Scattered chronic microangiopathic changes are noted  Workstation performed: [id number]       EKG reviewed personally:  Unavailable to me at this time    Telemetry reviewed personally:  Sinus rhythm      Assessment and Plan:  1  Left eye vision loss/left-sided facial numbness--likely secondary to patient's underlying Moyamoya disease  MRI showed no acute intracranial abnormalities  CTA performed no hemorrhage noted  Follow with Neurology  Echocardiogram pending  2   Hyperlipidemia --lipid panel done, cholesterol 253, triglycerides 206, HDL 26,   Continue with Lipitor 40 mg daily  Patient is stable from a cardiac standpoint  Barring any abnormalities on echocardiogram will sign off at this time  Call if needed  Code Status: Level 1 - Full Code      Thank you for allowing us to participate in this patient's care  This pt will follow up with Dr Dami Finn  once discharged  Counseling / Coordination of Care  Total floor / unit time spent today 35 minutes  Greater than 50% of total time was spent with the patient and / or family counseling and / or coordination of care  A description of the counseling / coordination of care: Review of history, current assessment, development of a plan         Lida Vernon PA-C  10/8/2017,12:35 PM

## 2017-10-08 NOTE — PLAN OF CARE
Problem: PAIN - ADULT  Goal: Verbalizes/displays adequate comfort level or baseline comfort level  Interventions:  - Encourage patient to monitor pain and request assistance  - Assess pain using appropriate pain scale  - Administer analgesics based on type and severity of pain and evaluate response  - Implement non-pharmacological measures as appropriate and evaluate response  - Consider cultural and social influences on pain and pain management  - Notify physician/advanced practitioner if interventions unsuccessful or patient reports new pain  Outcome: Progressing      Problem: INFECTION - ADULT  Goal: Absence or prevention of progression during hospitalization  INTERVENTIONS:  - Assess and monitor for signs and symptoms of infection  - Monitor lab/diagnostic results  - Monitor all insertion sites, i e  indwelling lines, tubes, and drains  - Monitor endotracheal (as able) and nasal secretions for changes in amount and color  - [place] appropriate cooling/warming therapies per order  - Administer medications as ordered  - Instruct and encourage patient and family to use good hand hygiene technique  - Identify and instruct in appropriate isolation precautions for identified infection/condition  Outcome: Progressing    Goal: Absence of fever/infection during neutropenic period  INTERVENTIONS:  - Monitor WBC  - Implement neutropenic guidelines  Outcome: Progressing      Problem: SAFETY ADULT  Goal: Patient will remain free of falls  INTERVENTIONS:  - Assess patient frequently for physical needs  -  Identify cognitive and physical deficits and behaviors that affect risk of falls    -  [place] fall precautions as indicated by assessment   - Educate patient/family on patient safety including physical limitations  - Instruct patient to call for assistance with activity based on assessment  - Modify environment to reduce risk of injury  - Consider OT/PT consult to assist with strengthening/mobility  Outcome: Progressing    Goal: Maintain or return to baseline ADL function  INTERVENTIONS:  -  Assess patient's ability to carry out ADLs; assess patient's baseline for ADL function and identify physical deficits which impact ability to perform ADLs (bathing, care of mouth/teeth, toileting, grooming, dressing, etc )  - Assess/evaluate cause of self-care deficits   - Assess range of motion  - Assess patient's mobility; develop plan if impaired  - Assess patient's need for assistive devices and provide as appropriate  - Encourage maximum independence but intervene and supervise when necessary  ¯ Involve family in performance of ADLs  ¯ Assess for home care needs following discharge   ¯ Request OT consult to assist with ADL evaluation and planning for discharge  ¯ Provide patient education as appropriate  Outcome: Progressing    Goal: Maintain or return mobility status to optimal level  INTERVENTIONS:  - Assess patient's baseline mobility status (ambulation, transfers, stairs, etc )    - Identify cognitive and physical deficits and behaviors that affect mobility  - Identify mobility aids required to assist with transfers and/or ambulation (gait belt, sit-to-stand, lift, walker, cane, etc )  - [place] fall precautions as indicated by assessment  - Record patient progress and toleration of activity level on Mobility SBAR; progress patient to next Phase/Stage  - Instruct patient to call for assistance with activity based on assessment  - Request Rehabilitation consult to assist with strengthening/weightbearing, etc   Outcome: Progressing      Problem: DISCHARGE PLANNING  Goal: Discharge to home or other facility with appropriate resources  INTERVENTIONS:  - Identify barriers to discharge w/patient and caregiver  - Arrange for needed discharge resources and transportation as appropriate  - Identify discharge learning needs (meds, wound care, etc )  - Arrange for interpretive services to assist at discharge as needed  - Refer to Case Management Department for coordinating discharge planning if the patient needs post-hospital services based on physician/advanced practitioner order or complex needs related to functional status, cognitive ability, or social support system  Outcome: Progressing      Problem: Knowledge Deficit  Goal: Patient/family/caregiver demonstrates understanding of disease process, treatment plan, medications, and discharge instructions  Complete learning assessment and assess knowledge base  Interventions:  - Provide teaching at level of understanding  - Provide teaching via preferred learning methods  Outcome: Progressing      Problem: Neurological Deficit  Goal: Neurological status is stable or improving  Interventions:  - Monitor and assess patient's level of consciousness, motor function, sensory function, and level of assistance needed for ADLs  - Monitor and report changes from baseline  Collaborate with interdisciplinary team to initiate plan and implement interventions as ordered  - Provide and maintain a safe environment  - Utilize seizure precautions  - Utilize fall precautions  - Utilize aspiration precautions  - Utilize bleeding precautions  Outcome: Progressing      Problem: Activity Intolerance/Impaired Mobility  Goal: Mobility/activity is maintained at optimum level for patient  Interventions:  - Assess and monitor patient  barriers to mobility and need for assistive/adaptive devices  - Assess patient's emotional response to limitations  - Collaborate with interdisciplinary team and initiate plans and interventions as ordered  - Encourage independent activity per ability   - Maintain proper body alignment  - Perform active/passive rom as tolerated/ordered    - Plan activities to conserve energy   - Turn patient  Outcome: Progressing      Problem: Communication Impairment  Goal: Ability to express needs and understand communication  Assess patient's communication skills and ability to understand information  Patient will demonstrate use of effective communication techniques, alternative methods of communication and understanding even if not able to speak  - Encourage communication and provide alternate methods of communication as needed  - Collaborate with case management/ for discharge needs  - Include patient/family/caregiver in decisions related to communication  Outcome: Progressing      Problem: Potential for Aspiration  Goal: Non-ventilated patient's risk of aspiration is minimized  Assess and monitor vital signs, respiratory status, and labs (WBC)  Monitor for signs of aspiration (tachypnea, cough, rales, wheezing, cyanosis, fever)  - Assess and monitor patient's ability to swallow  - Place patient up in chair to eat if possible  - HOB up at 90 degrees to eat if unable to get patient up into chair   - Supervise patient during oral intake  - Instruct patient to take small bites  - Instruct patient to take small single sips when taking liquids  - Follow patient-specific strategies generated by speech pathologist   Outcome: Progressing    Goal: Ventilated patient's risk of aspiration is minimized  Assess and monitor vital signs, respiratory status, airway cuff pressure, and labs (WBC)  Monitor for signs of aspiration (tachypnea, cough, rales, wheezing, cyanosis, fever)  - Elevate head of bed 30 degrees if patient has tube feeding   - Monitor tube feeding  Outcome: Progressing      Problem: Nutrition  Goal: Nutrition/Hydration status is improving  Monitor and assess patient's nutrition/hydration status for malnutrition (ex- brittle hair, bruises, dry skin, pale skin and conjunctiva, muscle wasting, smooth red tongue, and disorientation)  Collaborate with interdisciplinary team and initiate plan and interventions as ordered  Monitor patient's weight and dietary intake as ordered or per policy  Utilize nutrition screening tool and intervene per policy   Determine patient's food preferences and provide high-protein, high-caloric foods as appropriate  - Assist patient with eating   - Allow adequate time for meals   - Encourage patient to take dietary supplement as ordered  - Collaborate with clinical nutritionist   - Include patient/family/caregiver in decisions related to nutrition  Outcome: Progressing      Problem: NEUROSENSORY - ADULT  Goal: Achieves stable or improved neurological status  INTERVENTIONS  - Monitor and report changes in neurological status  - Initiate measures to prevent increased intracranial pressure  - Maintain blood pressure and fluid volume within ordered parameters to optimize cerebral perfusion  - Monitor temperature, glucose, and sodium or any other associated labs  Initiate appropriate interventions as ordered  - Monitor for seizure activity   - Administer anti-seizure medications as ordered  Outcome: Progressing    Goal: Achieves maximal functionality and self care  INTERVENTIONS  - Monitor swallowing and airway patency with patient fatigue and changes in neurological status  - Encourage and assist patient to increase activity and self care with guidance from rehab services  - Encourage visually impaired, hearing impaired and aphasic patients to use assistive/communication devices  Outcome: Progressing      Problem: CARDIOVASCULAR - ADULT  Goal: Maintains optimal cardiac output and hemodynamic stability  INTERVENTIONS:  - Monitor I/O, vital signs and rhythm  - Monitor for S/S and trends of decreased cardiac output i e  bleeding, hypotension  - Administer and titrate ordered vasoactive medications to optimize hemodynamic stability  - Assess quality of pulses, skin color and temperature  - Assess for signs of decreased coronary artery perfusion - ex   Angina  - Instruct patient to report change in severity of symptoms  Outcome: Progressing    Goal: Absence of cardiac dysrhythmias or at baseline rhythm  INTERVENTIONS:  - Continuous cardiac monitoring, monitor vital signs, obtain 12 lead EKG if indicated  - Administer antiarrhythmic and heart rate control medications as ordered  - Monitor electrolytes and administer replacement therapy as ordered  Outcome: Progressing

## 2017-10-08 NOTE — H&P
History and Physical - [facility]    Patient Information: Albert Rees 42 y o  female MRN: [id number]  Unit/Bed#: ED 10 Encounter: [id number]  Admitting Physician: Prince Gray PA-C  PCP: Skylar Maloney DO  Date of Admission:  10/07/17    Assessment/Plan:    Hospital Problem List:     Principal Problem:    Left sided numbness  Active Problems:    History of CVA (cerebrovascular accident)    Tobacco use    Moyamoya disease    Leukocytosis      Plan for the Primary Problem(s):  · Left-sided numbness with history of CVA  · Continue home Aggrenox, statin, lipid panel, stroke protocol with vitals and neuro checks, Neurology consult, brain MRI, telemetry monitoring, nursing dysphagia screening, baseline NIH score, CRP, homocysteine, a1c, carotid dopplers, ECHO  · Did not order physical therapy occupational therapy, speech, Physical Medicine Rehab is no acute stroke was found at this time  · Reviewed home meds with patient    Plan for Additional Problems:   · Moyamoya disease  · [address] with Dr Carmen Almanza who is her neurosurgeon  · Tobacco use  · Nicoderm patch, tobacco cessation counseling  · Leukocytosis  · Etiology unknown, will order chest x-ray, trend leukocytosis, UA clear for infection    VTE Prophylaxis: Enoxaparin (Lovenox)  / sequential compression device   Code Status: full  POLST: POLST form is not discussed and not completed at this time  Anticipated Length of Stay:  Patient will be admitted on an Inpatient basis with an anticipated length of stay of  > 2 midnights  Justification for Hospital Stay: r/o acute CVA    Total Time for Visit, including Counseling / Coordination of Care: 1 hour  Greater than 50% of this total time spent on direct patient counseling and coordination of care      Chief Complaint:   Left-sided numbness since 0 600    History of Present Illness:    Albert Rees is a 42 y o  female who presents with extensive past medical history of moyamoya disease, multiple CVAs in the past, tobacco use approximately 1 pack per day presenting with left vision loss last night at approximately 2200 hours  Patient has residual peripheral vision loss in her left eye from prior CVA  This morning when she woke up at approximately 0 600 she had left sided facial numbness with numbness in her left arm and left leg  She had brain surgery in April of this year at [facility] with Dr Dewayne Bonds  It was a revascularization surgery as per patient  She follows up with him regularly  She states that she had aphasia after the surgery which is now resolved  She is currently homeless and lives at a 8  She with case management worker involved  She admits to continuous headache daily  She denies any other systemic symptoms at this time  All sx have currently resolved at this time except some minor left sided LE tingling  Review of Systems:    Review of Systems   Constitutional: Negative for chills and fever  HENT: Negative for congestion  Eyes: Positive for visual disturbance  Respiratory: Negative for cough, shortness of breath and wheezing  Cardiovascular: Negative for chest pain, palpitations and leg swelling  Gastrointestinal: Negative for abdominal distention, abdominal pain, constipation, diarrhea, nausea and vomiting  Genitourinary: Negative for hematuria  Musculoskeletal: Negative for gait problem  Skin: Negative for color change  Neurological: Positive for facial asymmetry, numbness and headaches  Negative for dizziness, tremors, seizures, syncope, speech difficulty, weakness and light-headedness  Psychiatric/Behavioral: Negative for confusion         Past Medical and Surgical History:     Past Medical History:   Diagnosis Date    History of CVA (cerebrovascular accident)     Moyamoya disease     Tobacco use        Past Surgical History:   Procedure Laterality Date    BACK SURGERY      BRAIN SURGERY         Meds/Allergies:    Prior to Admission medications    Medication Sig Start Date End Date Taking? Authorizing Provider   furosemide (LASIX) 20 mg tablet Take 1 tablet by mouth daily 7/18/17   Isatu Hernandez MD   levothyroxine 137 mcg tablet Take 1 tablet by mouth daily 7/18/17   Isatu Hernandez MD   potassium chloride (K-DUR) 10 mEq tablet Take 1 tablet by mouth 2 (two) times a day 7/18/17   Isatu Hernandez MD     I have reviewed home medications with patient personally  Allergies: No Known Allergies    Social History:     Marital Status: Single   Occupation: unknown  Patient Pre-hospital Living Situation: tent  Patient Pre-hospital Level of Mobility: full  Patient Pre-hospital Diet Restrictions: none  Substance Use History:   History   Alcohol Use No     History   Smoking Status    Current Every Day Smoker    Packs/day: 1 00    Types: E-Cigarettes   Smokeless Tobacco    Never Used     History   Drug Use No       Family History:    History reviewed  No pertinent family history  Physical Exam:     Vitals:   Blood Pressure: 126/83 (10/07/17 2106)  Pulse: 68 (10/07/17 2106)  Temperature: 98 4 °F (36 9 °C) (10/07/17 1930)  Temp Source: Oral (10/07/17 1930)  Respirations: 18 (10/07/17 2106)  Weight - Scale: 84 1 kg (185 lb 6 5 oz) (10/07/17 1731)  SpO2: 97 % (10/07/17 2106)    Physical Exam   Constitutional: She is oriented to person, place, and time  She appears well-developed and well-nourished  No distress  HENT:   Head: Normocephalic and atraumatic  Mouth/Throat: No oropharyngeal exudate  Eyes: Conjunctivae are normal  Right eye exhibits no discharge  Left eye exhibits no discharge  No scleral icterus  Left eye sluggish compared to right, peripheral vision loss in left eye compared to right   Neck: Neck supple  Cardiovascular: Normal rate, regular rhythm, normal heart sounds and intact distal pulses  Exam reveals no gallop and no friction rub      No murmur heard   Pulmonary/Chest: Effort normal and breath sounds normal  No respiratory distress  She has no wheezes  She has no rales  She exhibits no tenderness  Abdominal: Soft  Bowel sounds are normal  She exhibits no distension and no mass  There is no tenderness  There is no rebound and no guarding  Musculoskeletal: Normal range of motion  She exhibits no edema, tenderness or deformity  Upper and lower extremity muscle strength 5/5 and equal bilaterally   Neurological: She is alert and oriented to person, place, and time  No cranial nerve deficit  Coordination normal    Full sensation in upper and lower extremities bilaterally with face bilaterally  No facial droop or slurred speech on examination  Uvula rises midline  Skin: Skin is warm and dry  No rash noted  She is not diaphoretic  No erythema  No pallor  Psychiatric: She has a normal mood and affect  Her behavior is normal    Nursing note and vitals reviewed  Additional Data:     Lab Results: I have personally reviewed pertinent reports  Results from last 7 days  Lab Units 10/07/17  1751   WBC Thousand/uL 11 29*   HEMOGLOBIN g/dL 13 8   HEMATOCRIT % 42 1   PLATELETS Thousands/uL 379   NEUTROS PCT % 56   LYMPHS PCT % 38   MONOS PCT % 4   EOS PCT % 1       Results from last 7 days  Lab Units 10/07/17  1751   SODIUM mmol/L 139   POTASSIUM mmol/L 3 6   CHLORIDE mmol/L 102   CO2 mmol/L 25   BUN mg/dL 9   CREATININE mg/dL 0 83   CALCIUM mg/dL 9 0   TOTAL PROTEIN g/dL 7 5   BILIRUBIN TOTAL mg/dL 0 30   ALK PHOS U/L 93   ALT U/L 23   AST U/L 11   GLUCOSE RANDOM mg/dL 139       Results from last 7 days  Lab Units 10/07/17  1752   INR  0 89       Imaging: I have personally reviewed pertinent reports  Cta Head And Neck With And Without Contrast    Result Date: 10/7/2017  Narrative: CTA NECK AND BRAIN WITH AND WITHOUT CONTRAST INDICATION:    left facial numbness  Left-sided weakness  History of moyamoya disease  COMPARISON:   None   TECHNIQUE: Routine CT imaging of the Brain without contrast   Post contrast imaging was performed after administration of iodinated contrast through the neck and brain  Post contrast axial 0 625 mm images timed to opacify the arterial system  3D rendering was performed on an independent workstation  MIP reconstructions performed  Coronal reconstructions were performed of the noncontrast portion of the brain  Radiation dose length product (DLP) for this visit:  1334 mGy-cm   This examination, like all CT scans performed in the [facility], was performed utilizing techniques to minimize radiation dose exposure, including the use of iterative reconstruction and automated exposure control  IV Contrast:  85 mL of iohexol (OMNIPAQUE)     IMAGE QUALITY:   Diagnostic FINDINGS: NONCONTRAST BRAIN PARENCHYMA:  Mild diffuse cerebral volume loss  Mild scattered white matter change consistent with chronic microangiopathy  Old infarction identified within the right posterior temporal lobe and occipital lobe extending superiorly into the parietal lobe  Normal basilar cisterns, brainstem and cerebellum  No hemorrhage  VENTRICLES AND EXTRA-AXIAL SPACES:  Normal for patient's age  VISUALIZED ORBITS AND PARANASAL SINUSES:  Unremarkable  CALVARIUM AND EXTRACRANIAL SOFT TISSUES:   Normal  CERVICAL VASCULATURE AORTIC ARCH AND GREAT VESSELS:  Normal aortic arch and great vessel origins  Normal visualized subclavian vessels  RIGHT VERTEBRAL ARTERY CERVICAL SEGMENT:  Normal origin  The vessel is normal in caliber throughout the neck  LEFT VERTEBRAL ARTERY CERVICAL SEGMENT:  Normal origin  The vessel is normal in caliber throughout the neck  RIGHT EXTRACRANIAL CAROTID SEGMENT:  Normal common carotid artery  Evaluation of the bifurcation demonstrates no focal stenosis of the origin of the internal carotid artery    There is abrupt tapering of the right internal carotid artery beginning within  the distal aspect of the bulb and extending throughout the cervical segment  The vessel is diffusely decreased in caliber from the distal aspect of the bulb to the skull base measuring only approximately 2 mm in diameter  LEFT EXTRACRANIAL CAROTID SEGMENT:  Normal common carotid artery  The origin of the internal carotid artery is unremarkable  Similar to the opposite right side there is abrupt smooth narrowing of the internal carotid artery bulb distally and the entire  remaining left cervical internal carotid artery to the skull base demonstrates diffusely decreased caliber measuring only 2 mm  NASCET criteria was used to determine the degree of internal carotid artery diameter stenosis  INTRACRANIAL VASCULATURE INTERNAL CAROTID ARTERIES:  Both intracranial internal carotid arteries are small in size  There is severe stenosis of the distal cavernous segments with occlusion of both internal carotid arteries  ANTERIOR CIRCULATION:  Both A1 segments are severely hypoplastic but patent  The AP 2 segments are patent proximally but abruptly occludes with multiple collaterals seen within the anterior cerebral artery distribution  MIDDLE CEREBRAL ARTERY CIRCULATION:  There is occlusion of the right M1 segment with multiple collaterals surrounding the M1 segment and extending into the middle cerebral artery territory bilaterally, especially the lenticulostriate vessels within the basal ganglia  The left distal M1 segment is hypoplastic but patent  Similar to the opposite right side there are multiple collateral vessels throughout the middle cerebral artery territory most pronounced are the lenticulostriates within the basal ganglia  DISTAL VERTEBRAL ARTERIES:  Normal distal vertebral arteries  Posterior inferior cerebellar artery origins are normal  Normal vertebral basilar junction  BASILAR ARTERY:  Basilar artery is normal in caliber  Normal superior cerebellar arteries   POSTERIOR CEREBRAL ARTERIES: The right posterior cerebral artery arises from the basilar tip with a normal P1 segment  However, the P2 segment is diffusely markedly decreased in caliber  The left P1 segment arises from the basilar tip  There is a posterior cerebral artery which appears to be arising in the region of the occluded left internal carotid artery and extending into the paramesencephalic cistern  DURAL VENOUS SINUSES:  Poor visualization of the dural venous sinuses  No evidence of occlusion  NON VASCULAR ANATOMY BONY STRUCTURES:  No acute osseous abnormality  Previous right hemispheric craniotomy involving the squamosal temporal bone  SOFT TISSUES OF THE NECK:  Normal         THORACIC INLET:  Unremarkable  Impression: Normal bifurcations  However, the cervical internal carotid arteries abruptly, markedly narrow at the distal aspect of the bulb and are diffusely narrowed throughout the neck  The intracranial internal carotid arteries are narrow as well, becoming severe at the level of the anterior aspect of the cavernous segments with occlusion at the ICA terminus  The anterior and middle cerebral artery territories demonstrate multiple collateral vessels with hypoplastic or occluded A1 and M1 segments  The collateral vessels are seen throughout the anterior and middle cerebral artery territories consistent with the provided history of moyamoya disease  Normal vertebral arteries and vertebrobasilar system  The right posterior cerebral artery arises from the basilar tip but becomes diffusely significantly narrowed at the level of the P2 segment  Unfortunately there are no prior examinations at this institution for comparison  Previous right squamosal craniotomy  Examination of the brain demonstrates old infarction within the posterior temporal lobe, occipital lobe and parietal lobe with mild atrophy and scattered chronic microangiopathic change  No definite territorial infarction identified at this time    Consider MRI with diffusion imaging if there is concern for acute infarction  No hemorrhage  ##[id number]##imslh    Workstation performed: [id number]       EKG, Pathology, and Other Studies Reviewed on Admission:   · EKG, CTA head and neck, CMP, CBC, UA    Allscripts / Epic Records Reviewed: No     ** Please Note: This note has been constructed using a voice recognition system   **

## 2017-10-08 NOTE — ED NOTES
Called [facility] requesting a tele box, spoke with Iris Dave, TARSHA  10/07/17 TARSHA Fuentes  10/07/17 9419

## 2017-10-08 NOTE — PLAN OF CARE
Activity Intolerance/Impaired Mobility     Mobility/activity is maintained at optimum level for patient Progressing        CARDIOVASCULAR - ADULT     Maintains optimal cardiac output and hemodynamic stability Progressing     Absence of cardiac dysrhythmias or at baseline rhythm Progressing        Communication Impairment     Ability to express needs and understand communication Kerri Knowlessarahbeverley Erickson Discharge to home or other facility with appropriate resources Progressing        INFECTION - ADULT     Absence or prevention of progression during hospitalization Progressing     Absence of fever/infection during neutropenic period Progressing        Knowledge Deficit     Patient/family/caregiver demonstrates understanding of disease process, treatment plan, medications, and discharge instructions Progressing        Neurological Deficit     Neurological status is stable or improving Progressing        NEUROSENSORY - ADULT     Achieves stable or improved neurological status Progressing     Achieves maximal functionality and self care Progressing        Nutrition     Nutrition/Hydration status is improving Progressing        Nutrition/Hydration-ADULT     Nutrient/Hydration intake appropriate for improving, restoring or maintaining nutritional needs Progressing        PAIN - ADULT     Verbalizes/displays adequate comfort level or baseline comfort level Progressing        Potential for Aspiration     Non-ventilated patient's risk of aspiration is minimized Progressing     Ventilated patient's risk of aspiration is minimized Progressing        Potential for Falls     Patient will remain free of falls Progressing        SAFETY ADULT     Patient will remain free of falls Progressing     Maintain or return to baseline ADL function Progressing     Maintain or return mobility status to optimal level Progressing

## 2017-10-08 NOTE — CONSULTS
changes, patient has a right frontal craniotomy and has narrowing of the bilateral internal carotid arteries and intracranial stenosis and normal vertebral artery and vertebral basilar system discussed with Radiology  Review of Systems:  10 point review of systems from admitting physician on 10/07/2017 were reviewed, exceptions are as per history of present illness  Historical Information   Past Medical History:   Diagnosis Date    History of CVA (cerebrovascular accident)     Moyamoya disease     Tobacco use      Past Surgical History:   Procedure Laterality Date    BACK SURGERY      BRAIN SURGERY       Social History   History   Smoking Status    Current Every Day Smoker    Packs/day: 1 00    Types: E-Cigarettes   Smokeless Tobacco    Never Used     History   Alcohol Use No     History   Drug Use No       Family History:   History reviewed  No pertinent family history  No Known Allergies    Meds:  All current active meds have been reviewed    Scheduled Meds:  aspirin-dipyridamole 1 capsule Oral Q12H [facility]   atorvastatin 40 mg Oral QPM   doxepin 25 mg Oral HS   enoxaparin 40 mg Subcutaneous Daily   gabapentin 300 mg Oral TID   nicotine 1 patch Transdermal Daily   PARoxetine 20 mg Oral Daily     PRN Meds:   acetaminophen    Physical Exam:   Objective   Vitals:   Vitals:    10/08/17 0800   BP: 133/78   Pulse: 61   Resp: 18   Temp: 98 1 °F (36 7 °C)   SpO2: 96%   ,Body mass index is 35 07 kg/m²  Patient was examined in bedside chair  General appearance: Cooperative in no acute distress  Head & neck head is atraumatic and normocephalic  Neck is supple with full range of motion  No meningeal signs, no temporal artery tenderness  Cardiovascular: Carotid arteries-no carotid bruits  Neurologic:   Mental status-the patient is awake alert and oriented without aphasia or apraxia  Other high cortical  intellectual functions are intact     CN:  Left homonymous hemianopia which according to the patient is old, funduscopic examination could not be done  Extraocular movements are full without nystagmus  Pupils are 3 mm and reactive  Face is symmetrical to light touch  Movements of facial expression move symmetrically  Hearing is normal to finger rub bilaterally  Soft palate lifts symmetrically  Shoulder shrug is symmetrical  Tongue is midline without atrophy  Motor: No drift is noted on arm extension  Strength is full in the upper and lower extremities with normal bulk and tone  Sensory: Intact to temperature and vibratory sensation in the upper and lower extremities bilaterally  Cortical function is intact  Coordination: Finger to nose testing is performed accurately  Romberg is negative  Gait reveals a normal base with symmetrical arm swing  Tandem gait is normal    Reflexes:  Slightly brisk and symmetrical in the biceps, triceps, brachial radialis, knee jerk and ankle jerk regions  Toes are downgoing  Lab Results:Lab Results:   CBC:   Results from last 7 days  Lab Units 10/08/17  0434 10/07/17  1751   WBC Thousand/uL 11 98* 11 29*   RBC Million/uL 4 53 4 92   HEMOGLOBIN g/dL 12 7 13 8   HEMATOCRIT % 38 6 42 1   MCV fL 85 86   PLATELETS Thousands/uL 315 379   , BMP/CMP:   Results from last 7 days  Lab Units 10/08/17  0434 10/07/17  1751   SODIUM mmol/L 139 139   POTASSIUM mmol/L 3 6 3 6   CHLORIDE mmol/L 103 102   CO2 mmol/L 26 25   ANION GAP mmol/L 10 12   BUN mg/dL 9 9   CREATININE mg/dL 0 73 0 83   GLUCOSE RANDOM mg/dL 116 139   CALCIUM mg/dL 9 0 9 0   AST U/L  --  11   ALT U/L  --  23   ALK PHOS U/L  --  93   TOTAL PROTEIN g/dL  --  7 5   ALBUMIN g/dL  --  3 9   BILIRUBIN TOTAL mg/dL  --  0 30   EGFR ml/min/1 73sq m 104 89     I have personally reviewed pertinent reports  EEG, Echo, Pathology, and Other Studies: I have personally reviewed pertinent films in PACS    Family, was not present at the bedside for history and examination  Assessment:  1   Left-sided numbness currently resolved with history of prior multiple CVAs on Aggrenox  2  Tobacco use  3  Leukocytosis  Plan:  Agree with MRI scan of the brain, according to the patient she has been noncompliant with Aggrenox and sometimes has not been taking it or may take it once a day, I discussed with her Plavix she does not want to go on Plavix, and tells me that she will be compliant with her Aggrenox, and understands the importance of that, patient to be on stroke pathway will need to keep LDL less than 70 and to quit smoking, patient was advised the importance of that, we will consult Cardiology, also would recommend to keep blood pressure cholesterol and sugar under control, patient would like to follow up with her neurosurgeon at [facility] as an outpatient, other management as per primary team, case discussed with primary team       Counseling / Coordination of Care  Total time spent today 45 minutes  Greater than 50% of total time was spent with the patient and / or family counseling and / or coordination of care  A description of the counseling / coordination of care:     Lidia Claudio MD  10/8/2017,10:39 AM    Dictation voice to text software has been used in the creation of this document  Please consider this in light of any contextual or grammatical errors

## 2017-10-08 NOTE — PROGRESS NOTES
[facility] Progress Note  Patient: Galen Del Cid 42 y o  female   MRN: [id number]  PCP: Lazaro Kwan DO  Unit/Bed#: -01 Encounter: [id number]  Date Of Visit: 10/08/17    Assessment:    Principal Problem:    Left sided numbness  Active Problems:    History of CVA (cerebrovascular accident)    Tobacco use    Moyamoya disease    Leukocytosis      Plan:    1  Left-sided numbness, TIA, the setting of history of CVS and moyamoya disease, spoke with Neurology, the patient does follow with Neurosurgery outpatient, the patient looks like was not taking Aggrenox at home  At this time Aggrenox has been resumed  2  On account of recurrent symptomatology recommendation was also to get cardiology evaluation  3  Telemetry monitoring today, transthoracic echocardiogram, MRI reviewed  4  Hyperlipidemia, goal statin would be less than 70 high potency statin started        VTE Pharmacologic Prophylaxis:   Pharmacologic: Enoxaparin (Lovenox)  Mechanical VTE Prophylaxis in Place: Yes    Patient Centered Rounds: I have performed bedside rounds with nursing staff today  Discussions with Specialists or Other Care Team Provider:y    Education and Discussions with Family / Patient: y    Time Spent for Care: 20 minutes  More than 50% of total time spent on counseling and coordination of care as described above  Current Length of Stay: 1 day(s)    Current Patient Status: Inpatient   Certification Statement: The patient will continue to require additional inpatient hospital stay due to tia    Code Status: Level 1 - Full Code      Subjective:   Facial numbness has improved    Objective:     Vitals:   Temp (24hrs), Av 3 °F (36 8 °C), Min:98 °F (36 7 °C), Max:98 9 °F (37 2 °C)    HR:  [61-88] 72  Resp:  [18-30] 18  BP: (121-141)/(66-93) 137/91  SpO2:  [95 %-100 %] 98 %  Body mass index is 35 07 kg/m²  Input and Output Summary (last 24 hours):        Intake/Output Summary (Last 24 hours) at 10/08/17 Marquise Silva filed at 10/08/17 0800   Gross per 24 hour   Intake              120 ml   Output                0 ml   Net              120 ml       Physical Exam:     Physical Exam   Constitutional: She is oriented to person, place, and time  No distress  HENT:   Head: Normocephalic  Eyes: Pupils are equal, round, and reactive to light  Neck: No tracheal deviation present  Cardiovascular: Normal rate  No murmur heard  Pulmonary/Chest: No respiratory distress  Abdominal: She exhibits no distension  Musculoskeletal: She exhibits no edema  Neurological: She is alert and oriented to person, place, and time  Skin: She is not diaphoretic  Additional Data:     Labs:      Results from last 7 days  Lab Units 10/08/17  0434 10/07/17  1751   WBC Thousand/uL 11 98* 11 29*   HEMOGLOBIN g/dL 12 7 13 8   HEMATOCRIT % 38 6 42 1   PLATELETS Thousands/uL 315 379   NEUTROS PCT %  --  56   LYMPHS PCT %  --  38   MONOS PCT %  --  4   EOS PCT %  --  1       Results from last 7 days  Lab Units 10/08/17  0434 10/07/17  1751   SODIUM mmol/L 139 139   POTASSIUM mmol/L 3 6 3 6   CHLORIDE mmol/L 103 102   CO2 mmol/L 26 25   BUN mg/dL 9 9   CREATININE mg/dL 0 73 0 83   CALCIUM mg/dL 9 0 9 0   TOTAL PROTEIN g/dL  --  7 5   BILIRUBIN TOTAL mg/dL  --  0 30   ALK PHOS U/L  --  93   ALT U/L  --  23   AST U/L  --  11   GLUCOSE RANDOM mg/dL 116 139       Results from last 7 days  Lab Units 10/08/17  0434   INR  0 89       * I Have Reviewed All Lab Data Listed Above  * Additional Pertinent Lab Tests Reviewed:  [facility] Admission Reviewed    Imaging:            Last 24 Hours Medication List:     aspirin-dipyridamole 1 capsule Oral Q12H [address]   atorvastatin 40 mg Oral QPM   doxepin 25 mg Oral HS   enoxaparin 40 mg Subcutaneous Daily   gabapentin 300 mg Oral TID   nicotine 1 patch Transdermal Daily   PARoxetine 20 mg Oral Daily        Today, Patient Was Seen By: Nela Hennessy MD    ** Please Note: Dragon 360 Dictation voice to text software may have been used in the creation of this document   **

## 2017-10-09 ENCOUNTER — APPOINTMENT (INPATIENT)
Dept: ULTRASOUND IMAGING | Facility: HOSPITAL | Age: 37
DRG: 058 | End: 2017-10-09
Payer: COMMERCIAL

## 2017-10-09 PROBLEM — Z65.9 PSYCHOSOCIAL PROBLEM: Status: ACTIVE | Noted: 2017-10-09

## 2017-10-09 PROBLEM — E03.9 HYPOTHYROIDISM: Status: ACTIVE | Noted: 2017-10-09

## 2017-10-09 LAB
ATRIAL RATE: 89 BPM
ERYTHROCYTE [DISTWIDTH] IN BLOOD BY AUTOMATED COUNT: 12.8 % (ref 11.6–15.1)
HCT VFR BLD AUTO: 39.3 % (ref 34.8–46.1)
HGB BLD-MCNC: 12.9 G/DL (ref 11.5–15.4)
MCH RBC QN AUTO: 28.4 PG (ref 26.8–34.3)
MCHC RBC AUTO-ENTMCNC: 32.8 G/DL (ref 31.4–37.4)
MCV RBC AUTO: 86 FL (ref 82–98)
P AXIS: 67 DEGREES
PLATELET # BLD AUTO: 303 THOUSANDS/UL (ref 149–390)
PMV BLD AUTO: 10.2 FL (ref 8.9–12.7)
PR INTERVAL: 138 MS
QRS AXIS: 92 DEGREES
QRSD INTERVAL: 74 MS
QT INTERVAL: 386 MS
QTC INTERVAL: 469 MS
RBC # BLD AUTO: 4.55 MILLION/UL (ref 3.81–5.12)
T WAVE AXIS: 62 DEGREES
VENTRICULAR RATE: 89 BPM
WBC # BLD AUTO: 10.76 THOUSAND/UL (ref 4.31–10.16)

## 2017-10-09 PROCEDURE — 93880 EXTRACRANIAL BILAT STUDY: CPT

## 2017-10-09 PROCEDURE — 85027 COMPLETE CBC AUTOMATED: CPT | Performed by: INTERNAL MEDICINE

## 2017-10-09 RX ORDER — LEVOTHYROXINE SODIUM 0.05 MG/1
50 TABLET ORAL
Status: DISCONTINUED | OUTPATIENT
Start: 2017-10-09 | End: 2017-10-10 | Stop reason: HOSPADM

## 2017-10-09 RX ADMIN — PAROXETINE HYDROCHLORIDE 20 MG: 20 TABLET, FILM COATED ORAL at 08:56

## 2017-10-09 RX ADMIN — DOXEPIN HYDROCHLORIDE 25 MG: 25 CAPSULE ORAL at 21:45

## 2017-10-09 RX ADMIN — ASPIRIN AND DIPYRIDAMOLE 1 CAPSULE: 25; 200 CAPSULE, EXTENDED RELEASE ORAL at 08:56

## 2017-10-09 RX ADMIN — ENOXAPARIN SODIUM 40 MG: 40 INJECTION SUBCUTANEOUS at 08:56

## 2017-10-09 RX ADMIN — NICOTINE 1 PATCH: 21 PATCH, EXTENDED RELEASE TRANSDERMAL at 08:56

## 2017-10-09 RX ADMIN — ASPIRIN AND DIPYRIDAMOLE 1 CAPSULE: 25; 200 CAPSULE, EXTENDED RELEASE ORAL at 21:10

## 2017-10-09 RX ADMIN — GABAPENTIN 300 MG: 300 CAPSULE ORAL at 08:56

## 2017-10-09 RX ADMIN — LEVOTHYROXINE SODIUM 50 MCG: 50 TABLET ORAL at 15:22

## 2017-10-09 RX ADMIN — GABAPENTIN 300 MG: 300 CAPSULE ORAL at 21:10

## 2017-10-09 RX ADMIN — ATORVASTATIN CALCIUM 40 MG: 40 TABLET, FILM COATED ORAL at 17:34

## 2017-10-09 RX ADMIN — GABAPENTIN 300 MG: 300 CAPSULE ORAL at 17:34

## 2017-10-09 RX ADMIN — ACETAMINOPHEN 650 MG: 325 TABLET ORAL at 14:41

## 2017-10-09 NOTE — SOCIAL WORK
Spoke to patient in her room and she states she is homeless, lives in a tent in her friend's backyard with an electric cord from house to her tent for light  She states she had brain surgery 2017 July, and was DC'd to her tent post op  She states she applied for [employer] and was denied so her electric was shut off where she did live and then her land lord evicted her, thus she lives in a tent  When asked about family, father is , Mother now lives in [place] house with her one sister but sister is a drug addict and she does not get along with the Mother  The other sister drinks a bottle of Vodka a day so that is not appropriate  She states her boyfriend lives in a house with a group of very heavy drug users  as far as her care, she is independant with ADL's and ambulation  wShe does have aphagia at times  she states she has a care manager at [facility] and stated they did not help her  She states she does get her meds at [facility] in [place]  Due to the complex nature of this DC Plan, Estefany Caal will take the DC planning for this pt

## 2017-10-09 NOTE — ASSESSMENT & PLAN NOTE
· No acute stroke noted during this admission  · MRI reviewed No acute intracranial abnormality   Chronic right posterior cerebral artery infarct   Scattered chronic microangiopathic changes are noted  · CTA reviewed Normal bifurcations   However, the cervical internal carotid arteries abruptly, markedly narrow at the distal aspect of the bulb and are diffusely narrowed throughout the neck  The intracranial internal carotid arteries are narrow as well, becoming severe at the level of the anterior aspect of the cavernous segments with occlusion at the ICA terminus  · Per Neurology recommendation, I will consult vascular surgery for further evaluation and recommendation

## 2017-10-09 NOTE — PROGRESS NOTES
Pt fixated on BP, requesting it be taken more frequently than scheduled- manual /78  Pt also requesting to go to the cafeteria and  gift shop- pt encourage to visit gift shop once discharged

## 2017-10-09 NOTE — PLAN OF CARE
Problem: PAIN - ADULT  Goal: Verbalizes/displays adequate comfort level or baseline comfort level  Interventions:  - Encourage patient to monitor pain and request assistance  - Assess pain using appropriate pain scale  - Administer analgesics based on type and severity of pain and evaluate response  - Implement non-pharmacological measures as appropriate and evaluate response  - Consider cultural and social influences on pain and pain management  - Notify physician/advanced practitioner if interventions unsuccessful or patient reports new pain   Outcome: Progressing      Problem: INFECTION - ADULT  Goal: Absence or prevention of progression during hospitalization  INTERVENTIONS:  - Assess and monitor for signs and symptoms of infection  - Monitor lab/diagnostic results  - Monitor all insertion sites, i e  indwelling lines, tubes, and drains  - Monitor endotracheal (as able) and nasal secretions for changes in amount and color  - [place] appropriate cooling/warming therapies per order  - Administer medications as ordered  - Instruct and encourage patient and family to use good hand hygiene technique  - Identify and instruct in appropriate isolation precautions for identified infection/condition   Outcome: Progressing    Goal: Absence of fever/infection during neutropenic period  INTERVENTIONS:  - Monitor WBC  - Implement neutropenic guidelines   Outcome: Progressing      Problem: SAFETY ADULT  Goal: Patient will remain free of falls  INTERVENTIONS:  - Assess patient frequently for physical needs  -  Identify cognitive and physical deficits and behaviors that affect risk of falls    -  [place] fall precautions as indicated by assessment   - Educate patient/family on patient safety including physical limitations  - Instruct patient to call for assistance with activity based on assessment  - Modify environment to reduce risk of injury  - Consider OT/PT consult to assist with strengthening/mobility    Outcome: Progressing    Goal: Maintain or return to baseline ADL function  INTERVENTIONS:  -  Assess patient's ability to carry out ADLs; assess patient's baseline for ADL function and identify physical deficits which impact ability to perform ADLs (bathing, care of mouth/teeth, toileting, grooming, dressing, etc )  - Assess/evaluate cause of self-care deficits   - Assess range of motion  - Assess patient's mobility; develop plan if impaired  - Assess patient's need for assistive devices and provide as appropriate  - Encourage maximum independence but intervene and supervise when necessary  ¯ Involve family in performance of ADLs  ¯ Assess for home care needs following discharge   ¯ Request OT consult to assist with ADL evaluation and planning for discharge  ¯ Provide patient education as appropriate   Outcome: Progressing    Goal: Maintain or return mobility status to optimal level  INTERVENTIONS:  - Assess patient's baseline mobility status (ambulation, transfers, stairs, etc )    - Identify cognitive and physical deficits and behaviors that affect mobility  - Identify mobility aids required to assist with transfers and/or ambulation (gait belt, sit-to-stand, lift, walker, cane, etc )  - [place] fall precautions as indicated by assessment  - Record patient progress and toleration of activity level on Mobility SBAR; progress patient to next Phase/Stage  - Instruct patient to call for assistance with activity based on assessment  - Request Rehabilitation consult to assist with strengthening/weightbearing, etc    Outcome: Progressing      Problem: DISCHARGE PLANNING  Goal: Discharge to home or other facility with appropriate resources  INTERVENTIONS:  - Identify barriers to discharge w/patient and caregiver  - Arrange for needed discharge resources and transportation as appropriate  - Identify discharge learning needs (meds, wound care, etc )  - Arrange for interpretive services to assist at discharge as needed  - Refer to Case Management Department for coordinating discharge planning if the patient needs post-hospital services based on physician/advanced practitioner order or complex needs related to functional status, cognitive ability, or social support system   Outcome: Progressing      Problem: Knowledge Deficit  Goal: Patient/family/caregiver demonstrates understanding of disease process, treatment plan, medications, and discharge instructions  Complete learning assessment and assess knowledge base  Interventions:  - Provide teaching at level of understanding  - Provide teaching via preferred learning methods   Outcome: Progressing      Problem: Neurological Deficit  Goal: Neurological status is stable or improving  Interventions:  - Monitor and assess patient's level of consciousness, motor function, sensory function, and level of assistance needed for ADLs  - Monitor and report changes from baseline  Collaborate with interdisciplinary team to initiate plan and implement interventions as ordered  - Provide and maintain a safe environment  - Utilize seizure precautions  - Utilize fall precautions  - Utilize aspiration precautions  - Utilize bleeding precautions  Outcome: Progressing      Problem: Activity Intolerance/Impaired Mobility  Goal: Mobility/activity is maintained at optimum level for patient  Interventions:  - Assess and monitor patient  barriers to mobility and need for assistive/adaptive devices  - Assess patient's emotional response to limitations  - Collaborate with interdisciplinary team and initiate plans and interventions as ordered  - Encourage independent activity per ability   - Maintain proper body alignment  - Perform active/passive rom as tolerated/ordered    - Plan activities to conserve energy   - Turn patient   Outcome: Progressing      Problem: Communication Impairment  Goal: Ability to express needs and understand communication  Assess patient's communication skills and ability to understand information  Patient will demonstrate use of effective communication techniques, alternative methods of communication and understanding even if not able to speak  - Encourage communication and provide alternate methods of communication as needed  - Collaborate with case management/ for discharge needs  - Include patient/family/caregiver in decisions related to communication  Outcome: Progressing      Problem: Potential for Aspiration  Goal: Non-ventilated patient's risk of aspiration is minimized  Assess and monitor vital signs, respiratory status, and labs (WBC)  Monitor for signs of aspiration (tachypnea, cough, rales, wheezing, cyanosis, fever)  - Assess and monitor patient's ability to swallow  - Place patient up in chair to eat if possible  - HOB up at 90 degrees to eat if unable to get patient up into chair   - Supervise patient during oral intake  - Instruct patient to take small bites  - Instruct patient to take small single sips when taking liquids  - Follow patient-specific strategies generated by speech pathologist    Outcome: Progressing    Goal: Ventilated patient's risk of aspiration is minimized  Assess and monitor vital signs, respiratory status, airway cuff pressure, and labs (WBC)  Monitor for signs of aspiration (tachypnea, cough, rales, wheezing, cyanosis, fever)  - Elevate head of bed 30 degrees if patient has tube feeding   - Monitor tube feeding  Outcome: Progressing      Problem: Nutrition  Goal: Nutrition/Hydration status is improving  Monitor and assess patient's nutrition/hydration status for malnutrition (ex- brittle hair, bruises, dry skin, pale skin and conjunctiva, muscle wasting, smooth red tongue, and disorientation)  Collaborate with interdisciplinary team and initiate plan and interventions as ordered  Monitor patient's weight and dietary intake as ordered or per policy  Utilize nutrition screening tool and intervene per policy  Determine patient's food preferences and provide high-protein, high-caloric foods as appropriate  - Assist patient with eating   - Allow adequate time for meals   - Encourage patient to take dietary supplement as ordered  - Collaborate with clinical nutritionist   - Include patient/family/caregiver in decisions related to nutrition  Outcome: Progressing      Problem: NEUROSENSORY - ADULT  Goal: Achieves stable or improved neurological status  INTERVENTIONS  - Monitor and report changes in neurological status  - Initiate measures to prevent increased intracranial pressure  - Maintain blood pressure and fluid volume within ordered parameters to optimize cerebral perfusion  - Monitor temperature, glucose, and sodium or any other associated labs  Initiate appropriate interventions as ordered  - Monitor for seizure activity   - Administer anti-seizure medications as ordered   Outcome: Progressing    Goal: Achieves maximal functionality and self care  INTERVENTIONS  - Monitor swallowing and airway patency with patient fatigue and changes in neurological status  - Encourage and assist patient to increase activity and self care with guidance from rehab services  - Encourage visually impaired, hearing impaired and aphasic patients to use assistive/communication devices   Outcome: Progressing      Problem: CARDIOVASCULAR - ADULT  Goal: Maintains optimal cardiac output and hemodynamic stability  INTERVENTIONS:  - Monitor I/O, vital signs and rhythm  - Monitor for S/S and trends of decreased cardiac output i e  bleeding, hypotension  - Administer and titrate ordered vasoactive medications to optimize hemodynamic stability  - Assess quality of pulses, skin color and temperature  - Assess for signs of decreased coronary artery perfusion - ex   Angina  - Instruct patient to report change in severity of symptoms   Outcome: Progressing    Goal: Absence of cardiac dysrhythmias or at baseline rhythm  INTERVENTIONS:  - Continuous cardiac monitoring, monitor vital signs, obtain 12 lead EKG if indicated  - Administer antiarrhythmic and heart rate control medications as ordered  - Monitor electrolytes and administer replacement therapy as ordered   Outcome: Progressing      Problem: Potential for Falls  Goal: Patient will remain free of falls  INTERVENTIONS:  - Assess patient frequently for physical needs  -  Identify cognitive and physical deficits and behaviors that affect risk of falls  -  [place] fall precautions as indicated by assessment   - Educate patient/family on patient safety including physical limitations  - Instruct patient to call for assistance with activity based on assessment  - Modify environment to reduce risk of injury  - Consider OT/PT consult to assist with strengthening/mobility    Outcome: Progressing      Problem: Nutrition/Hydration-ADULT  Goal: Nutrient/Hydration intake appropriate for improving, restoring or maintaining nutritional needs  Monitor and assess patient's nutrition/hydration status for malnutrition (ex- brittle hair, bruises, dry skin, pale skin and conjunctiva, muscle wasting, smooth red tongue, and disorientation)  Collaborate with interdisciplinary team and initiate plan and interventions as ordered  Monitor patient's weight and dietary intake as ordered or per policy  Utilize nutrition screening tool and intervene per policy  Determine patient's food preferences and provide high-protein, high-caloric foods as appropriate       INTERVENTIONS:  - Monitor oral intake, urinary output, labs, and treatment plans  - Assess nutrition and hydration status and recommend course of action  - Evaluate amount of meals eaten  - Assist patient with eating if necessary   - Allow adequate time for meals  - Recommend/ encourage appropriate diets, oral nutritional supplements, and vitamin/mineral supplements  - Order, calculate, and assess calorie counts as needed  - Recommend, monitor, and adjust tube feedings and TPN/PPN based on assessed needs  - Assess need for intravenous fluids  - Provide specific nutrition/hydration education as appropriate  - Include patient/family/caregiver in decisions related to nutrition   Outcome: Progressing

## 2017-10-09 NOTE — CASE MANAGEMENT
[address] [place] [address] in the [employer] by Jose Garcia for 2017  Network Utilization Review Department  Phone: [phone]; Fax [phone]  ATTENTION: The Network Utilization Review Department is now centralized for our 7 Facilities  Make a note that we have a new phone and fax numbers for our Department  Please call with any questions or concerns to [phone] and carefully follow the prompts so that you are directed to the right person  All voicemails are confidential  Fax any determinations, approvals, denials, and requests for initial or continue stay review clinical to [phone]  Due to HIGH CALL volume, it would be easier if you could please send faxed requests to expedite your requests and in part, help us provide discharge notifications faster  Initial Clinical Review    Admission: Date/Time/Statement: 10/7/17 @ 2217     Orders Placed This Encounter   Procedures    Inpatient Admission (expected length of stay for this patient is greater than two midnights)     Standing Status:   Standing     Number of Occurrences:   1     Order Specific Question:   Admitting Physician     Answer:   Kris Campos     Order Specific Question:   Level of Care     Answer:   Med Surg [16]     Order Specific Question:   Estimated length of stay     Answer:   More than 2 Midnights     Order Specific Question:   Certification     Answer:   I certify that inpatient services are medically necessary for this patient for a duration of greater than two midnights  See H&P and MD Progress Notes for additional information about the patient's course of treatment           ED: Date/Time/Mode of Arrival:   ED Arrival Information     Expected Arrival Acuity Means of Arrival Escorted By Service Admission Type    - 10/7/2017 17:23 Urgent Walk-In Spouse General Medicine Urgent    Arrival Complaint    STROKE          Chief Complaint:   Chief Complaint   Patient presents with    CVA/TIA-like Symptoms     Pt to ED with complaints of numbess on left side of face/ left sided weakness  States has a history of  Colon Glance  Symptoms started around 10 pm last night  History of Illness: Ej Urena is a 42 y o  female who presents with extensive past medical history of moyamoya disease, multiple CVAs in the past, tobacco use approximately 1 pack per day presenting with left vision loss last night at approximately 2200 hours  Patient has residual peripheral vision loss in her left eye from prior CVA  This morning when she woke up at approximately 0 600 she had left sided facial numbness with numbness in her left arm and left leg  She had brain surgery in April of this year at [facility] with Dr Lucero Botello  It was a revascularization surgery as per patient  She follows up with him regularly  She states that she had aphasia after the surgery which is now resolved  She is currently homeless and lives at a 8  She with case management worker involved  She admits to continuous headache daily  She denies any other systemic symptoms at this time   All sx have currently resolved at this time except some minor left sided LE tingling        ED Vital Signs:   ED Triage Vitals   Temperature Pulse Respirations Blood Pressure SpO2   10/07/17 1930 10/07/17 1731 10/07/17 1731 10/07/17 1731 10/07/17 1731   98 4 °F (36 9 °C) 87 18 141/93 100 %      Temp Source Heart Rate Source Patient Position - Orthostatic VS BP Location FiO2 (%)   10/07/17 1731 10/07/17 1731 10/07/17 1731 10/07/17 1731 --   Oral Monitor Lying Right arm       Pain Score       10/07/17 1731       No Pain        Wt Readings from Last 1 Encounters:   10/07/17 84 2 kg (185 lb 10 oz)       Vital Signs (abnormal): wnl    Abnormal Labs/Diagnostic Test Results:ua + prot , tr ketones, sm bili , lrg bld, wbc  11 29  CTA head and neck-    Normal bifurcations   However, the cervical internal carotid arteries abruptly, markedly narrow at the distal aspect of the bulb and are diffusely narrowed throughout the neck  The intracranial internal carotid arteries are narrow as well, becoming severe at the level of the anterior aspect of the cavernous segments with occlusion at the ICA terminus  The anterior and middle cerebral artery territories demonstrate multiple collateral vessels with hypoplastic or occluded A1 and M1 segments   The collateral vessels are seen throughout the anterior and middle cerebral artery territories consistent with   the provided history of moyamoya disease  Normal vertebral arteries and vertebrobasilar system   The right posterior cerebral artery arises from the basilar tip but becomes diffusely significantly narrowed at the level of the P2 segment  Unfortunately there are no prior examinations at this institution for comparison  Previous right squamosal craniotomy  Examination of the brain demonstrates old infarction within the posterior temporal lobe, occipital lobe and parietal lobe with mild atrophy and scattered chronic microangiopathic change   No definite territorial infarction identified at this time     Consider MRI with diffusion imaging if there is concern for acute infarction  No hemorrhage  ED Treatment:   Medication Administration from 10/07/2017 1723 to 10/07/2017 2307       Date/Time Order Dose Route Action Action by Comments     10/07/2017 1831 iohexol (OMNIPAQUE) 350 MG/ML injection (MULTI-DOSE) 85 mL 85 mL Intravenous Given Brionna Weinstein           Past Medical/Surgical History:    Active Ambulatory Problems     Diagnosis Date Noted    No Active Ambulatory Problems     Resolved Ambulatory Problems     Diagnosis Date Noted    No Resolved Ambulatory Problems     Past Medical History:   Diagnosis Date    History of CVA (cerebrovascular accident)     Moyamoya disease     Tobacco use        Admitting Diagnosis: Moyamoya disease [I67 5]  Numbness [R20 0]  History of CVA (cerebrovascular accident) [Z86 73]  Left sided numbness [R20 0]    Age/Sex: 42 y o  female    Assessment/Plan: Assessment/Plan:     Hospital Problem List:      Principal Problem:    Left sided numbness  Active Problems:    History of CVA (cerebrovascular accident)    Tobacco use    Moyamoya disease    Leukocytosis   Plan for the Primary Problem(s):  · Left-sided numbness with history of CVA  ? Continue home Aggrenox, statin, lipid panel, stroke protocol with vitals and neuro checks, Neurology consult, brain MRI, telemetry monitoring, nursing dysphagia screening, baseline NIH score, CRP, homocysteine, a1c, carotid dopplers, ECHO  ? Did not order physical therapy occupational therapy, speech, Physical Medicine Rehab is no acute stroke was found at this time  ? Reviewed home meds with patient   Plan for Additional Problems:   · Moyamoya disease  ? [address] with Dr Tomasa Lyon who is her neurosurgeon  · Tobacco use  ? Nicoderm patch, tobacco cessation counseling  · Leukocytosis  ? Etiology unknown, will order chest x-ray, trend leukocytosis, UA clear for infection   VTE Prophylaxis: Enoxaparin (Lovenox)  / sequential compression device   Code Status: full  POLST: POLST form is not discussed and not completed at this time      Anticipated Length of Stay:  Patient will be admitted on an Inpatient basis with an anticipated length of stay of  > 2 midnights     Justification for Hospital Stay: r/o acute CVA    Admission Orders:  Scheduled Meds:   aspirin-dipyridamole 1 capsule Oral Q12H [address]   atorvastatin 40 mg Oral QPM   doxepin 25 mg Oral HS   enoxaparin 40 mg Subcutaneous Daily   gabapentin 300 mg Oral TID   nicotine 1 patch Transdermal Daily   PARoxetine 20 mg Oral Daily     Continuous Infusions:    PRN Meds:   acetaminophen     Cardiac diet   Up w/ assist   Inc spirom   Neuro check s q4hr   Dysphagia eval   VAS carotids   SCD  Tele   10/9 cbc  cardiology and neuro consult tele    Neuro consult 10/8   Assessment:  1  Left-sided numbness currently resolved with history of prior multiple CVAs on Aggrenox  2  Tobacco use  3  Leukocytosis    Plan:  Agree with MRI scan of the brain, according to the patient she has been noncompliant with Aggrenox and sometimes has not been taking it or may take it once a day, I discussed with her Plavix she does not want to go on Plavix, and tells me that she will be compliant with her Aggrenox, and understands the importance of that, patient to be on stroke pathway will need to keep LDL less than 70 and to quit smoking, patient was advised the importance of that, we will consult Cardiology, also would recommend to keep blood pressure cholesterol and sugar under control, patient would like to follow up with her neurosurgeon at [facility] as an outpatient, other management as per primary team, case discussed with primary team      Internal med progress note 10/8  Date Of Visit: 10/08/17   Assessment:   Principal Problem:    Left sided numbness  Active Problems:    History of CVA (cerebrovascular accident)    Tobacco use    Moyamoya disease    Leukocytosis  Plan:   1  Left-sided numbness, TIA, the setting of history of CVS and moyamoya disease, spoke with Neurology, the patient does follow with Neurosurgery outpatient, the patient looks like was not taking Aggrenox at home  At this time Aggrenox has been resumed  2  On account of recurrent symptomatology recommendation was also to get cardiology evaluation  3  Telemetry monitoring today, transthoracic echocardiogram, MRI reviewed  4   Hyperlipidemia, goal statin would be less than 70 high potency statin started   VTE Pharmacologic Prophylaxis:   Pharmacologic: Enoxaparin (Lovenox)  Mechanical VTE Prophylaxis in Place: Yes   Patient Centered Rounds: I have performed bedside rounds with nursing staff today    Discussions with Specialists or Other Care Team Provider:y   Education and Discussions with Family / Patient: y   Time Spent for Care: 20 minutes  More than 50% of total time spent on counseling and coordination of care as described above    Current Length of Stay: 1 day(s)   Current Patient Status: Inpatient   Certification Statement: The patient will continue to require additional inpatient hospital stay due to tia   Code Status: Level 1 - Full Code    Cardiology consult 10/8   Assessment and Plan:  1  Left eye vision loss/left-sided facial numbness--likely secondary to patient's underlying Moyamoya disease  MRI showed no acute intracranial abnormalities  CTA performed no hemorrhage noted  Follow with Neurology  Echocardiogram pending     2  Hyperlipidemia --lipid panel done, cholesterol 253, triglycerides 206, HDL 26,   Continue with Lipitor 40 mg daily     Patient is stable from a cardiac standpoint  Barring any abnormalities on echocardiogram will sign off at this time   Call if needed

## 2017-10-09 NOTE — ASSESSMENT & PLAN NOTE
· This has resolved  Patient denies any recurrent symptoms of left-sided numbness  · Possible TIA, history of CVA and moist more disease  · Patient has been noncompliant with Aggrenox, has been resume during this admission  Educate and encourage patient to be compliant with her treatment plan  · Echocardiogram review, Systolic function was normal  Ejection fraction was estimated to be 60 %  There were no regional wall motion abnormalities   Left ventricular diastolic function parameters were normal

## 2017-10-09 NOTE — PLAN OF CARE
Problem: DISCHARGE PLANNING - CARE MANAGEMENT  Goal: Discharge to post-acute care or home with appropriate resources  INTERVENTIONS:  - Conduct assessment to determine patient/family and health care team treatment goals, and need for post-acute services based on payer coverage, community resources, and patient preferences, and barriers to discharge  - Address psychosocial, clinical, and financial barriers to discharge as identified in assessment in conjunction with the patient/family and health care team  - Arrange appropriate level of post-acute services according to patients   needs and preference and payer coverage in collaboration with the physician and health care team  - Communicate with and update the patient/family, physician, and health care team regarding progress on the discharge plan  - Arrange appropriate transportation to post-acute venues  Outcome: Progressing  Spoke to patient in her room and she states she is homeless, lives in a tent in her friend's backyard with an electric cord from house to her tent for light  She states she had brain surgery 2017, and was DC'd to her tent post op  She states she applied for [employer] and was denied so her electric was shut off where she did live and then her land lord evicted her, thus she lives in a tent  When asked about family, father is , Mother now lives in [facility] house with her one sister but sister is a drug addict and she does not get along with the Mother  The other sister drinks a bottle of Vodka a day so that is not appropriate  She states her boyfriend lives in a house with a group of very heavy drug users  as far as her care, she is independant with ADL's and ambulation  wShe does have aphagia at times  she states she has a care manager at [facility] and stated they did not help her  She states she does get her meds at [facility] in [place]   Due to the complex nature of this DC Plan, Ivelisse Reynoso will take the DC planning for this pt

## 2017-10-09 NOTE — PROGRESS NOTES
Patient without any complaints, at  baseline has left homonymous hemianopia, does not want to be on Plavix, will continue with Aggrenox, MRI scan of the brain without evidence of any acute stroke, primary team was advised to discuss with patient's neurosurgeon regarding her management and patient will need to follow up with them

## 2017-10-09 NOTE — PROGRESS NOTES
Progress Note - Antonette Larios 42 y o  female MRN: [id number]    Unit/Bed#: -01 Encounter: [id number]        Psychosocial problem   Assessment & Plan    Per Neurology and Cardiology patient clear for discharge  Plan of care discussed with patient, during which she states I feel like crap, I am not leaving, I do not want to leave, I live in a tent it is raining it might be leaking; and oh! my left eye hurts it has been throbbing all morning, usually when my a left eye throbs It feels like another mini-stroke is coming on"  Pt appears to be anxious with regards to living situation  ,  Pt requesting to speak with neurology  I contacted Case Management to talk with help coordinate possible woman's resources or the shoulder at the [employer]  Patient verbalize she already has a  with [facility] and will be contacting them for assistance  History of CVA (cerebrovascular accident)   Assessment & Plan    · No acute stroke noted during this admission  · MRI reviewed No acute intracranial abnormality   Chronic right posterior cerebral artery infarct   Scattered chronic microangiopathic changes are noted  · CTA reviewed Normal bifurcations   However, the cervical internal carotid arteries abruptly, markedly narrow at the distal aspect of the bulb and are diffusely narrowed throughout the neck  The intracranial internal carotid arteries are narrow as well, becoming severe at the level of the anterior aspect of the cavernous segments with occlusion at the ICA terminus  · Per Neurology recommendation, I will consult vascular surgery for further evaluation and recommendation  Hypothyroidism   Assessment & Plan    TSH 4 8  Will monitor T3 and T4  Initiate levothyroxine at 50 mcg  Tobacco use   Assessment & Plan    Smoking cessation encouraged        * Left sided numbness   Assessment & Plan    · This has resolved    Patient denies any recurrent symptoms of left-sided numbness  · Patient reports left eye pain, she reports previous symptoms of this related to "small beck" on my exam No neurological focal findings  Patient ambulating in the room  Upper extremity strength equal 4-5  Denies visual or speech disturbance  She is ambulating in room gait is steady  · Possible TIA, history of CVA and moist more disease  · Patient has been noncompliant with Aggrenox, has been resume during this admission  Educate and encourage patient to be compliant with her treatment plan  · Echocardiogram review, Systolic function was normal  Ejection fraction was estimated to be 60 %  There were no regional wall motion abnormalities  Left ventricular diastolic function parameters were normal             DVT Prophylaxis:  Lovenox    Discussions with Specialists or Other Care Team Provider:  Neurology, case management, primary nurse, vascular    Education and Discussions with Family / Patient:  Patient    Time Spent for Care: 75  More than 50% of total time spent on counseling and coordination of care as described above  Current Length of Stay: 2 day(s)    Code Status: Level 1 - Full Code      Subjective:  Upon entry in room patient ambulating  Plan of care discussed with patient with regards to discharge  She states I feel like Tenishaalfa Gallagher she refuses discharge, she reports "her tent might be leaking due to the rain"     Objective:     Vitals:   Temp (24hrs), Av °F (36 7 °C), Min:97 5 °F (36 4 °C), Max:98 7 °F (37 1 °C)    HR:  [] 104  Resp:  [16-19] 17  BP: (118-141)/(70-90) 118/70  SpO2:  [96 %-100 %] 97 %  Body mass index is 35 07 kg/m²  Input and Output Summary (last 24 hours): Intake/Output Summary (Last 24 hours) at 10/09/17 1336  Last data filed at 10/08/17 1400   Gross per 24 hour   Intake              240 ml   Output                0 ml   Net              240 ml       Physical Exam:     Physical Exam   Constitutional: She is oriented to person, place, and time  She appears well-developed and well-nourished  HENT:   Head: Normocephalic and atraumatic  Eyes: Conjunctivae, EOM and lids are normal  Pupils are equal, round, and reactive to light  Right eye pain  Neck: Normal range of motion  Neck supple  Cardiovascular: Normal rate, regular rhythm, normal heart sounds and intact distal pulses  Pulmonary/Chest: Effort normal and breath sounds normal  She exhibits no tenderness  Abdominal: Soft  Bowel sounds are normal  There is no tenderness  Musculoskeletal: Normal range of motion  She exhibits no edema  No neurological focal findings  Patient ambulating in the room  Upper extremity strength equal 4-5  Neurological: She is alert and oriented to person, place, and time  Skin: Skin is warm and dry  Psychiatric: She has a normal mood and affect  Her behavior is normal  Judgment normal    Nursing note and vitals reviewed  Additional Data:     Labs:      Results from last 7 days  Lab Units 10/09/17  0549  10/07/17  1751   WBC Thousand/uL 10 76*  < > 11 29*   HEMOGLOBIN g/dL 12 9  < > 13 8   HEMATOCRIT % 39 3  < > 42 1   PLATELETS Thousands/uL 303  < > 379   NEUTROS PCT %  --   --  56   LYMPHS PCT %  --   --  38   MONOS PCT %  --   --  4   EOS PCT %  --   --  1   < > = values in this interval not displayed      Results from last 7 days  Lab Units 10/08/17  0434 10/07/17  1751   SODIUM mmol/L 139 139   POTASSIUM mmol/L 3 6 3 6   CHLORIDE mmol/L 103 102   CO2 mmol/L 26 25   BUN mg/dL 9 9   CREATININE mg/dL 0 73 0 83   CALCIUM mg/dL 9 0 9 0   TOTAL PROTEIN g/dL  --  7 5   BILIRUBIN TOTAL mg/dL  --  0 30   ALK PHOS U/L  --  93   ALT U/L  --  23   AST U/L  --  11   GLUCOSE RANDOM mg/dL 116 139       Results from last 7 days  Lab Units 10/08/17  0434   INR  0 89         Recent Cultures (last 7 days):           Last 24 Hours Medication List:     aspirin-dipyridamole 1 capsule Oral Q12H [address]   atorvastatin 40 mg Oral QPM   doxepin 25 mg Oral HS   enoxaparin 40 mg Subcutaneous Daily   gabapentin 300 mg Oral TID   levothyroxine 50 mcg Oral Early Morning   nicotine 1 patch Transdermal Daily   PARoxetine 20 mg Oral Daily        Today, Patient Was Seen By: NADEEN Kennedy    ** Please Note: Dragon 360 Dictation voice to text software may have been used in the creation of this document   **

## 2017-10-09 NOTE — ASSESSMENT & PLAN NOTE
Per Neurology and Cardiology patient clear for discharge  Plan of care discussed with patient, during which she states I feel like crap, I am not leaving, I do not want to leave, I live in a tent it is raining it might be leaking; and oh! my left eye hurts it has been throbbing all morning, usually when my a left eye throbs It feels like another mini-stroke is coming on"  Pt appears to be anxious with regards to living situation  ,  Pt requesting to speak with neurology  I contacted Case Management to talk with help coordinate possible woman's resources or the shoulder at the [employer]  Patient verbalize she already has a  with [facility] and will be contacting them for assistance

## 2017-10-09 NOTE — PLAN OF CARE
Activity Intolerance/Impaired Mobility     Mobility/activity is maintained at optimum level for patient Progressing        CARDIOVASCULAR - ADULT     Maintains optimal cardiac output and hemodynamic stability Progressing     Absence of cardiac dysrhythmias or at baseline rhythm Progressing        Communication Impairment     Ability to express needs and understand communication Kerri Bensonjaime Erickson Discharge to home or other facility with appropriate resources Progressing        DISCHARGE PLANNING - CARE MANAGEMENT     Discharge to post-acute care or home with appropriate resources Progressing        INFECTION - ADULT     Absence or prevention of progression during hospitalization Progressing     Absence of fever/infection during neutropenic period Progressing        Knowledge Deficit     Patient/family/caregiver demonstrates understanding of disease process, treatment plan, medications, and discharge instructions Progressing        Neurological Deficit     Neurological status is stable or improving Progressing        NEUROSENSORY - ADULT     Achieves stable or improved neurological status Progressing     Achieves maximal functionality and self care Progressing        Nutrition     Nutrition/Hydration status is improving Progressing        Nutrition/Hydration-ADULT     Nutrient/Hydration intake appropriate for improving, restoring or maintaining nutritional needs Progressing        PAIN - ADULT     Verbalizes/displays adequate comfort level or baseline comfort level Progressing        Potential for Aspiration     Non-ventilated patient's risk of aspiration is minimized Progressing     Ventilated patient's risk of aspiration is minimized Progressing        Potential for Falls     Patient will remain free of falls Progressing        SAFETY ADULT     Patient will remain free of falls Progressing     Maintain or return to baseline ADL function Progressing     Maintain or return mobility status to optimal level Progressing

## 2017-10-09 NOTE — CONSULTS
Asked to see patient for carotid artery evaluation  Reviewed studies with Dr Mauricio Najjar in detail  Patient has diffuse, smooth narrowing of internal carotid arteries as noted on CT with no significant subclavian disease  VAS was also reviewed by Dr Mauricio Najjar  No vascular surgery treatment indicated, recommend f/u with her neurosurgeon

## 2017-10-10 VITALS
DIASTOLIC BLOOD PRESSURE: 64 MMHG | WEIGHT: 185.63 LBS | HEIGHT: 61 IN | SYSTOLIC BLOOD PRESSURE: 126 MMHG | RESPIRATION RATE: 18 BRPM | OXYGEN SATURATION: 97 % | TEMPERATURE: 97.5 F | BODY MASS INDEX: 35.05 KG/M2 | HEART RATE: 61 BPM

## 2017-10-10 PROBLEM — R20.0 LEFT SIDED NUMBNESS: Status: RESOLVED | Noted: 2017-10-07 | Resolved: 2017-10-10

## 2017-10-10 RX ORDER — ATORVASTATIN CALCIUM 40 MG/1
40 TABLET, FILM COATED ORAL EVERY EVENING
Refills: 0 | Status: ON HOLD
Start: 2017-10-10 | End: 2018-08-27

## 2017-10-10 RX ORDER — GABAPENTIN 300 MG/1
300 CAPSULE ORAL 3 TIMES DAILY
Refills: 0
Start: 2017-10-10 | End: 2018-10-12 | Stop reason: HOSPADM

## 2017-10-10 RX ORDER — ASPIRIN AND DIPYRIDAMOLE 25; 200 MG/1; MG/1
1 CAPSULE, EXTENDED RELEASE ORAL EVERY 12 HOURS SCHEDULED
Qty: 60 CAPSULE | Refills: 0 | Status: SHIPPED | OUTPATIENT
Start: 2017-10-10 | End: 2018-06-22 | Stop reason: ALTCHOICE

## 2017-10-10 RX ORDER — DOXEPIN HYDROCHLORIDE 25 MG/1
25 CAPSULE ORAL
Refills: 0
Start: 2017-10-10 | End: 2018-10-12 | Stop reason: HOSPADM

## 2017-10-10 RX ORDER — NICOTINE 21 MG/24HR
1 PATCH, TRANSDERMAL 24 HOURS TRANSDERMAL DAILY
Qty: 28 PATCH | Refills: 0 | Status: SHIPPED | OUTPATIENT
Start: 2017-10-11 | End: 2018-07-17 | Stop reason: HOSPADM

## 2017-10-10 RX ORDER — PAROXETINE HYDROCHLORIDE 20 MG/1
20 TABLET, FILM COATED ORAL DAILY
Refills: 0
Start: 2017-10-11 | End: 2018-10-12 | Stop reason: HOSPADM

## 2017-10-10 RX ADMIN — NICOTINE 1 PATCH: 21 PATCH, EXTENDED RELEASE TRANSDERMAL at 10:03

## 2017-10-10 RX ADMIN — LEVOTHYROXINE SODIUM 50 MCG: 50 TABLET ORAL at 05:50

## 2017-10-10 RX ADMIN — GABAPENTIN 300 MG: 300 CAPSULE ORAL at 10:03

## 2017-10-10 RX ADMIN — ASPIRIN AND DIPYRIDAMOLE 1 CAPSULE: 25; 200 CAPSULE, EXTENDED RELEASE ORAL at 10:02

## 2017-10-10 RX ADMIN — ENOXAPARIN SODIUM 40 MG: 40 INJECTION SUBCUTANEOUS at 10:02

## 2017-10-10 NOTE — PLAN OF CARE
remain free of falls Adequate for Discharge        SAFETY ADULT     Patient will remain free of falls Adequate for Discharge     Maintain or return to baseline ADL function Adequate for Discharge     Maintain or return mobility status to optimal level Adequate for Discharge

## 2017-10-10 NOTE — DISCHARGE SUMMARY
Discharge Summary - [facility]    Patient Information: Rebecca Jennings 42 y o  female MRN: [id number]  Unit/Bed#: -01 Encounter: [id number]    Discharging Physician / Practitioner: NADEEN Schneider  PCP: Jose Lino DO  Admission Date: 10/7/2017  Discharge Date: 10/10/17    Reason for Admission:  Left-sided numbness    Discharge Diagnoses:     Principal Problem (Resolved):    Left sided numbness  Active Problems:    Psychosocial problem    History of CVA (cerebrovascular accident)    Moyamoya disease    Tobacco use    Leukocytosis    Hypothyroidism      Consultations During Hospital Stay:  · Vascular surgery  · Cardiology  · Neurology    Procedures Performed:     · Vascular carotid complete study  · Echocardiogram  · Chest x-ray  · MRI of the brain without contrast  · CTA of the head neck with and without contrast    Significant Findings:     · Left-sided numbness has resolved  · Hypothyroidism, TSH on admissions 4 8  Advised to continue levothyroxine  Incidental Findings:   · None    Test Results Pending at Discharge (will require follow up): None     Outpatient Tests Requested:  · Note test but to follow up with neurosurgeon  Complications:  None    Hospital Course: Rebecca Jennings is a 42 y o  female patient history of moyamoya disease, previous CVA, hypothyroidism who originally presented to the hospital on 10/7/2017 due to left side numbness, left eye pain, noncompliance with Aggrenox  History of brain surgery in April done at [facility]  History of the aphasia, none noted at this time  Occasional word pressuring noted  Neurology was consulted as well  CT of the brain was done no evidence of acute strokes  demonstrates old infarction within the posterior temporal lobe, occipital lobe and parietal lobe with mild atrophy and scattered chronic microangiopathic change  MRI was done as well with no acute findings    Neurology proceeded to do an MRA,  noted the cervical internal carotid arteries abruptly, markedly narrow at the distal aspect of the bulb and are diffusely narrowed throughout the neck  I proceeded to notify [facility] neurosurgery group, I discussed with the surgeon on call patient's current symptoms and complaints, current findings of CT, MRI and MRA  He reviewed patient's previous MRA he reports did not note any changes from previous study  He suggests for patient to follow up in 1 week and had no further recommendations  Patient was also evaluated by Cardiology for possible the embolic stroke no acute findings noted  Vascular reviewed patient's studies no further recommendations other than to follow up with Neurosurgery  Patient was due to discharge yesterday however she had psychosocial difficulties with possible tent leakage as she is homeless  Case management was consulted and I personally provided Information  to patient with regards to women's resources, homeless shelters, and the [employer] [employer]  Prior to discharge the patient denies left eye pain, denies left visual disturbance, left facial numbness resolved  She denies any other symptoms  Patient noted ambulating room with steady gait  She denies chest pain, dizziness lightheadedness  Patient is stable for discharge today  She is advised to follow up with her neurologist, neurosurgeon, in her PCP  Advise patient to follow up with PCP regarding elevated levels of TSH  Modifiable risk factors reviewed patient  Encouraged smoking cessation, encouraged low-fat diet, reviewed cholesterol level patient  She is encourage to be compliant with her Aggrenox  Prescription was given for Aggrenox and a nicotine patch to encourage smoking cessation  Condition at Discharge: stable     Discharge Day Visit / Exam:     Subjective:  Patient is awake alert oriented x3  She is ambulating in room  She has no complaints at this time      Vitals: Blood Pressure: 126/64 (10/10/17 0700)  Pulse: 61 (10/10/17 0700)  Temperature: 97 5 °F (36 4 °C) (10/10/17 0700)  Temp Source: Oral (10/10/17 0700)  Respirations: 18 (10/10/17 0700)  Height: 5' 1" (154 9 cm) (10/07/17 2303)  Weight - Scale: 84 2 kg (185 lb 10 oz) (10/07/17 2303)  SpO2: 97 % (10/10/17 0700)  Exam:   Physical Exam   Constitutional: She is oriented to person, place, and time  She appears well-developed and well-nourished  HENT:   Head: Normocephalic and atraumatic  Neck: Normal range of motion  Neck supple  Cardiovascular: Normal rate, regular rhythm, normal heart sounds and intact distal pulses  No murmur heard  Pulmonary/Chest: Effort normal and breath sounds normal  No respiratory distress  She exhibits no tenderness  Abdominal: Soft  Bowel sounds are normal    Musculoskeletal: Normal range of motion  She exhibits no edema  Neurological: She is alert and oriented to person, place, and time  She has normal strength  No cranial nerve deficit or sensory deficit  GCS eye subscore is 4  GCS verbal subscore is 5  GCS motor subscore is 6  Denies any focal numbness or past seizure  Denies left eye pain  Denies visual or speech disturbance  Skin: Skin is warm and dry  Psychiatric: She has a normal mood and affect  Nursing note and vitals reviewed  Discharge instructions/Information to patient and family:   See after visit summary for information provided to patient and family  Provisions for Follow-Up Care:  See after visit summary for information related to follow-up care and any pertinent home health orders  Disposition:     Home    For Discharges to   [facility] SN[facility]:   · Not Applicable to this Patient - Not Applicable to this Patient    Planned Readmission:  Possible     Discharge Statement:  I spent 40 minutes discharging the patient  This time was spent on the day of discharge  I had direct contact with the patient on the day of discharge   Greater than 50% of the total time was spent examining patient, answering all patient questions, arranging and discussing plan of care with patient as well as directly providing post-discharge instructions  Additional time then spent on discharge activities  Discharge Medications:  See after visit summary for reconciled discharge medications provided to patient and family  ** Please Note: Dragon 360 Dictation voice to text software may have been used in the creation of this document   **

## 2018-06-06 ENCOUNTER — APPOINTMENT (EMERGENCY)
Dept: CT IMAGING | Facility: HOSPITAL | Age: 38
End: 2018-06-06
Payer: COMMERCIAL

## 2018-06-06 ENCOUNTER — HOSPITAL ENCOUNTER (EMERGENCY)
Facility: HOSPITAL | Age: 38
Discharge: HOME | End: 2018-06-06
Attending: EMERGENCY MEDICINE
Payer: COMMERCIAL

## 2018-06-06 VITALS
RESPIRATION RATE: 19 BRPM | WEIGHT: 185.63 LBS | DIASTOLIC BLOOD PRESSURE: 77 MMHG | BODY MASS INDEX: 35.07 KG/M2 | HEART RATE: 91 BPM | TEMPERATURE: 97.9 F | SYSTOLIC BLOOD PRESSURE: 138 MMHG | OXYGEN SATURATION: 99 %

## 2018-06-06 DIAGNOSIS — R20.0 LEFT FACIAL NUMBNESS: Primary | ICD-10-CM

## 2018-06-06 DIAGNOSIS — Z34.90 EARLY STAGE OF PREGNANCY: ICD-10-CM

## 2018-06-06 DIAGNOSIS — I67.5 MOYAMOYA DISEASE: ICD-10-CM

## 2018-06-06 DIAGNOSIS — R51.9 CEPHALGIA: ICD-10-CM

## 2018-06-06 LAB
ALBUMIN SERPL BCP-MCNC: 3.5 G/DL (ref 3.5–5)
ALP SERPL-CCNC: 84 U/L (ref 46–116)
ALT SERPL W P-5'-P-CCNC: 18 U/L (ref 12–78)
ANION GAP SERPL CALCULATED.3IONS-SCNC: 12 MMOL/L (ref 4–13)
APTT PPP: 27 SECONDS (ref 24–36)
AST SERPL W P-5'-P-CCNC: 13 U/L (ref 5–45)
BASOPHILS # BLD AUTO: 0.04 THOUSANDS/ΜL (ref 0–0.1)
BASOPHILS NFR BLD AUTO: 0 % (ref 0–1)
BILIRUB DIRECT SERPL-MCNC: 0.06 MG/DL (ref 0–0.2)
BILIRUB SERPL-MCNC: 0.2 MG/DL (ref 0.2–1)
BILIRUB UR QL STRIP: NEGATIVE
BUN SERPL-MCNC: 8 MG/DL (ref 5–25)
CALCIUM SERPL-MCNC: 9.2 MG/DL (ref 8.3–10.1)
CHLORIDE SERPL-SCNC: 99 MMOL/L (ref 100–108)
CLARITY UR: NORMAL
CO2 SERPL-SCNC: 26 MMOL/L (ref 21–32)
COLOR UR: YELLOW
CREAT SERPL-MCNC: 0.64 MG/DL (ref 0.6–1.3)
EOSINOPHIL # BLD AUTO: 0.13 THOUSAND/ΜL (ref 0–0.61)
EOSINOPHIL NFR BLD AUTO: 1 % (ref 0–6)
ERYTHROCYTE [DISTWIDTH] IN BLOOD BY AUTOMATED COUNT: 14.6 % (ref 11.6–15.1)
ERYTHROCYTE [SEDIMENTATION RATE] IN BLOOD: 15 MM/HOUR (ref 0–20)
EXT PREG TEST URINE: POSITIVE
GFR SERPL CREATININE-BSD FRML MDRD: 112 ML/MIN/1.73SQ M
GLUCOSE SERPL-MCNC: 86 MG/DL (ref 65–140)
GLUCOSE UR STRIP-MCNC: NEGATIVE MG/DL
HCT VFR BLD AUTO: 40.3 % (ref 34.8–46.1)
HGB BLD-MCNC: 13.1 G/DL (ref 11.5–15.4)
HGB UR QL STRIP.AUTO: NEGATIVE
IMM GRANULOCYTES # BLD AUTO: 0.07 THOUSAND/UL (ref 0–0.2)
IMM GRANULOCYTES NFR BLD AUTO: 1 % (ref 0–2)
INR PPP: 0.88 (ref 0.86–1.17)
KETONES UR STRIP-MCNC: NEGATIVE MG/DL
LACTATE SERPL-SCNC: 1.2 MMOL/L (ref 0.5–2)
LEUKOCYTE ESTERASE UR QL STRIP: NEGATIVE
LYMPHOCYTES # BLD AUTO: 3.96 THOUSANDS/ΜL (ref 0.6–4.47)
LYMPHOCYTES NFR BLD AUTO: 28 % (ref 14–44)
MAGNESIUM SERPL-MCNC: 1.8 MG/DL (ref 1.6–2.6)
MCH RBC QN AUTO: 27.3 PG (ref 26.8–34.3)
MCHC RBC AUTO-ENTMCNC: 32.5 G/DL (ref 31.4–37.4)
MCV RBC AUTO: 84 FL (ref 82–98)
MONOCYTES # BLD AUTO: 0.91 THOUSAND/ΜL (ref 0.17–1.22)
MONOCYTES NFR BLD AUTO: 7 % (ref 4–12)
NEUTROPHILS # BLD AUTO: 8.97 THOUSANDS/ΜL (ref 1.85–7.62)
NEUTS SEG NFR BLD AUTO: 63 % (ref 43–75)
NITRITE UR QL STRIP: NEGATIVE
NRBC BLD AUTO-RTO: 0 /100 WBCS
PH UR STRIP.AUTO: 5.5 [PH] (ref 4.5–8)
PLATELET # BLD AUTO: 346 THOUSANDS/UL (ref 149–390)
PMV BLD AUTO: 11.2 FL (ref 8.9–12.7)
POTASSIUM SERPL-SCNC: 3.2 MMOL/L (ref 3.5–5.3)
PROT SERPL-MCNC: 7.4 G/DL (ref 6.4–8.2)
PROT UR STRIP-MCNC: NEGATIVE MG/DL
PROTHROMBIN TIME: 11.9 SECONDS (ref 11.8–14.2)
RBC # BLD AUTO: 4.79 MILLION/UL (ref 3.81–5.12)
SODIUM SERPL-SCNC: 137 MMOL/L (ref 136–145)
SP GR UR STRIP.AUTO: >=1.03 (ref 1–1.03)
TROPONIN I SERPL-MCNC: <0.02 NG/ML
TSH SERPL DL<=0.05 MIU/L-ACNC: 4.19 UIU/ML (ref 0.36–3.74)
UROBILINOGEN UR QL STRIP.AUTO: 0.2 E.U./DL
WBC # BLD AUTO: 14.08 THOUSAND/UL (ref 4.31–10.16)

## 2018-06-06 PROCEDURE — 85652 RBC SED RATE AUTOMATED: CPT | Performed by: EMERGENCY MEDICINE

## 2018-06-06 PROCEDURE — 99284 EMERGENCY DEPT VISIT MOD MDM: CPT

## 2018-06-06 PROCEDURE — 80048 BASIC METABOLIC PNL TOTAL CA: CPT | Performed by: EMERGENCY MEDICINE

## 2018-06-06 PROCEDURE — 96360 HYDRATION IV INFUSION INIT: CPT

## 2018-06-06 PROCEDURE — 81003 URINALYSIS AUTO W/O SCOPE: CPT | Performed by: EMERGENCY MEDICINE

## 2018-06-06 PROCEDURE — 85025 COMPLETE CBC W/AUTO DIFF WBC: CPT | Performed by: EMERGENCY MEDICINE

## 2018-06-06 PROCEDURE — 36415 COLL VENOUS BLD VENIPUNCTURE: CPT | Performed by: EMERGENCY MEDICINE

## 2018-06-06 PROCEDURE — 81025 URINE PREGNANCY TEST: CPT | Performed by: EMERGENCY MEDICINE

## 2018-06-06 PROCEDURE — 84484 ASSAY OF TROPONIN QUANT: CPT | Performed by: EMERGENCY MEDICINE

## 2018-06-06 PROCEDURE — 85610 PROTHROMBIN TIME: CPT | Performed by: EMERGENCY MEDICINE

## 2018-06-06 PROCEDURE — 96361 HYDRATE IV INFUSION ADD-ON: CPT

## 2018-06-06 PROCEDURE — 83735 ASSAY OF MAGNESIUM: CPT | Performed by: EMERGENCY MEDICINE

## 2018-06-06 PROCEDURE — 84443 ASSAY THYROID STIM HORMONE: CPT | Performed by: EMERGENCY MEDICINE

## 2018-06-06 PROCEDURE — 93005 ELECTROCARDIOGRAM TRACING: CPT

## 2018-06-06 PROCEDURE — 85730 THROMBOPLASTIN TIME PARTIAL: CPT | Performed by: EMERGENCY MEDICINE

## 2018-06-06 PROCEDURE — 80076 HEPATIC FUNCTION PANEL: CPT | Performed by: EMERGENCY MEDICINE

## 2018-06-06 PROCEDURE — 70450 CT HEAD/BRAIN W/O DYE: CPT

## 2018-06-06 PROCEDURE — 83605 ASSAY OF LACTIC ACID: CPT | Performed by: EMERGENCY MEDICINE

## 2018-06-06 RX ORDER — CALCIUM CARBONATE 500(1250)
1 TABLET ORAL DAILY
Qty: 60 TABLET | Refills: 0 | Status: ON HOLD | OUTPATIENT
Start: 2018-06-06 | End: 2018-07-16 | Stop reason: ALTCHOICE

## 2018-06-06 RX ORDER — ACETAMINOPHEN 325 MG/1
650 TABLET ORAL ONCE
Status: COMPLETED | OUTPATIENT
Start: 2018-06-06 | End: 2018-06-06

## 2018-06-06 RX ORDER — POTASSIUM CHLORIDE 20 MEQ/1
40 TABLET, EXTENDED RELEASE ORAL ONCE
Status: COMPLETED | OUTPATIENT
Start: 2018-06-06 | End: 2018-06-06

## 2018-06-06 RX ADMIN — POTASSIUM CHLORIDE 40 MEQ: 1500 TABLET, EXTENDED RELEASE ORAL at 22:45

## 2018-06-06 RX ADMIN — SODIUM CHLORIDE 1000 ML: 0.9 INJECTION, SOLUTION INTRAVENOUS at 19:42

## 2018-06-06 RX ADMIN — ACETAMINOPHEN 650 MG: 325 TABLET ORAL at 19:25

## 2018-06-06 NOTE — ED PROVIDER NOTES
History  Chief Complaint   Patient presents with    Headache     Pt reports headache starting this AM, left sided numbness x1 5 hours   Facial Numbness     HPI  67-year-old white female with a history of moyamoya syndrome presents with a chief complaint of headache and left-sided facial numbness over the past 2 hours  Patient states that she has a headache every day and has had surgery in the past on the right side at [address] in 2017  Patient states she has not followed up with her surgeon because she is homeless and cannot find a ride back and forth  Patient states she lost some of her memory sometimes it is hard to get the correct word  Patient also states that she is 30 days late on her menstrual cycle  Patient is a smoker  Patient states that she had unprotected intercourse with her boyfriend who had testicular cancer and treatment with radiation and chemo and thought that he did not have any swimmer's   Patient states she has a 19-year-old son  Patient states that she is homeless and usually stays at the [facility] but now that it is warmer she lives in a tent  Prior to Admission Medications   Prescriptions Last Dose Informant Patient Reported? Taking?    PARoxetine (PAXIL) 20 mg tablet   No No   Sig: Take 1 tablet by mouth daily   aspirin-dipyridamole (AGGRENOX)  mg per 12 hr capsule   No No   Sig: Take 1 capsule by mouth every 12 (twelve) hours   atorvastatin (LIPITOR) 40 mg tablet   No No   Sig: Take 1 tablet by mouth every evening   doxepin (SINEquan) 25 mg capsule   No No   Sig: Take 1 capsule by mouth daily at bedtime   gabapentin (NEURONTIN) 300 mg capsule   No No   Sig: Take 1 capsule by mouth 3 (three) times a day   levothyroxine 137 mcg tablet   No No   Sig: Take 1 tablet by mouth daily   nicotine (NICODERM CQ) 21 mg/24 hr TD 24 hr patch   No No   Sig: Place 1 patch on the skin daily   potassium chloride (K-DUR) 10 mEq tablet   No No   Sig: Take 1 tablet by mouth 2 (two) times a day      Facility-Administered Medications: None       Past Medical History:   Diagnosis Date    History of CVA (cerebrovascular accident)     Moyamoya disease     Tobacco use        Past Surgical History:   Procedure Laterality Date    BRAIN SURGERY         History reviewed  No pertinent family history  I have reviewed and agree with the history as documented  Social History   Substance Use Topics    Smoking status: Current Every Day Smoker     Packs/day: 0 50     Types: Cigarettes    Smokeless tobacco: Never Used    Alcohol use No        Review of Systems   Constitutional: Positive for fatigue  Negative for chills and fever  HENT: Negative for congestion and rhinorrhea  Eyes: Negative for discharge and visual disturbance  Respiratory: Negative for shortness of breath and wheezing  Cardiovascular: Negative for chest pain and palpitations  Gastrointestinal: Negative for abdominal pain and vomiting  Endocrine: Negative for polydipsia and polyuria  Genitourinary: Positive for menstrual problem  Negative for dysuria and hematuria  Musculoskeletal: Negative for arthralgias, gait problem and neck stiffness  Skin: Negative for rash and wound  Neurological: Positive for weakness, numbness and headaches  Negative for dizziness  Psychiatric/Behavioral: Negative for confusion and suicidal ideas  Physical Exam  Physical Exam   Constitutional: She is oriented to person, place, and time  She appears well-developed and well-nourished  70-year-old white female lying on the stretcher   HENT:   Head: Normocephalic  Mouth/Throat: Oropharynx is clear and moist    Patient has an eschar to the right temple region of her head was secondary to brain surgery   Eyes: EOM are normal  Pupils are equal, round, and reactive to light  Neck: Normal range of motion  Neck supple  Cardiovascular: Normal rate, regular rhythm and normal heart sounds      Pulmonary/Chest: Effort normal  No respiratory distress  Coarse breath sounds bilaterally was some rhonchi and slight wheezing   Abdominal: Soft  Bowel sounds are normal  There is no tenderness  There is no rebound and no guarding  Musculoskeletal: Normal range of motion  Neurological: She is alert and oriented to person, place, and time  No cranial nerve deficit  She exhibits normal muscle tone  Coordination normal    Patient has some memory loss and has some difficulty finding the right words when I ask her questions  Skin: Skin is warm and dry  There is erythema  Positive erythematous facies   Psychiatric: She has a normal mood and affect  Nursing note and vitals reviewed        Vital Signs  ED Triage Vitals [06/06/18 1843]   Temperature Pulse Respirations Blood Pressure SpO2   97 9 °F (36 6 °C) 91 19 138/77 99 %      Temp Source Heart Rate Source Patient Position - Orthostatic VS BP Location FiO2 (%)   Oral Monitor Lying Right arm --      Pain Score       8           Vitals:    06/06/18 1843   BP: 138/77   Pulse: 91   Patient Position - Orthostatic VS: Lying       Visual Acuity      ED Medications  Medications   sodium chloride 0 9 % bolus 1,000 mL (0 mL Intravenous Stopped 6/6/18 2308)   acetaminophen (TYLENOL) tablet 650 mg (650 mg Oral Given 6/6/18 1925)   potassium chloride (K-DUR,KLOR-CON) CR tablet 40 mEq (40 mEq Oral Given 6/6/18 2245)       Diagnostic Studies  Results Reviewed     Procedure Component Value Units Date/Time    Sedimentation rate, automated [[id number]]  (Normal) Collected:  06/06/18 1942    Lab Status:  Final result Specimen:  Blood from Arm, Left Updated:  06/06/18 2112     Sed Rate 15 mm/hour     Protime-INR [[id number]]  (Normal) Collected:  06/06/18 1942    Lab Status:  Final result Specimen:  Blood from Arm, Left Updated:  06/06/18 2043     Protime 11 9 seconds      INR 0 88    APTT [[id number]]  (Normal) Collected:  06/06/18 1942    Lab Status:  Final result Specimen:  Blood from Arm, Left Updated: 06/06/18 2043     PTT 27 seconds     Hepatic function panel [[id number]]  (Normal) Collected:  06/06/18 1942    Lab Status:  Final result Specimen:  Blood from Arm, Left Updated:  06/06/18 2028     Total Bilirubin 0 20 mg/dL      Bilirubin, Direct 0 06 mg/dL      Alkaline Phosphatase 84 U/L      AST 13 U/L      ALT 18 U/L      Total Protein 7 4 g/dL      Albumin 3 5 g/dL     TSH [[id number]]  (Abnormal) Collected:  06/06/18 1942    Lab Status:  Final result Specimen:  Blood from Arm, Left Updated:  06/06/18 2028     TSH 3RD GENERATON 4 192 (H) uIU/mL     Narrative:         Patients undergoing fluorescein dye angiography may retain small amounts of fluorescein in the body for 48-72 hours post procedure  Samples containing fluorescein can produce falsely depressed TSH values  If the patient had this procedure,a specimen should be resubmitted post fluorescein clearance  The recommended reference ranges for TSH during pregnancy are as follows:  First trimester 0 1 to 2 5 uIU/mL  Second trimester  0 2 to 3 0 uIU/mL  Third trimester 0 3 to 3 0 uIU/m      Magnesium [[id number]]  (Normal) Collected:  06/06/18 1942    Lab Status:  Final result Specimen:  Blood from Arm, Left Updated:  06/06/18 2028     Magnesium 1 8 mg/dL     Basic metabolic panel [[id number]]  (Abnormal) Collected:  06/06/18 1942    Lab Status:  Final result Specimen:  Blood from Arm, Left Updated:  06/06/18 2027     Sodium 137 mmol/L      Potassium 3 2 (L) mmol/L      Chloride 99 (L) mmol/L      CO2 26 mmol/L      Anion Gap 12 mmol/L      BUN 8 mg/dL      Creatinine 0 64 mg/dL      Glucose 86 mg/dL      Calcium 9 2 mg/dL      eGFR 112 ml/min/1 73sq m     Narrative:         National Kidney Disease Education Program recommendations are as follows:  GFR calculation is accurate only with a steady state creatinine  Chronic Kidney disease less than 60 ml/min/1 73 sq  meters  Kidney failure less than 15 ml/min/1 73 sq  meters      Troponin I [[id number]]  (Normal) Collected:  06/06/18 1942    Lab Status:  Final result Specimen:  Blood from Arm, Left Updated:  06/06/18 2020     Troponin I <0 02 ng/mL     Narrative:         [employer] Chemistry analyzer 99% cutoff is > 0 04 ng/mL in network labs    o cTnI 99% cutoff is useful only when applied to patients in the clinical setting of myocardial ischemia  o cTnI 99% cutoff should be interpreted in the context of clinical history, ECG findings and possibly cardiac imaging to establish correct diagnosis  o cTnI 99% cutoff may be suggestive but clearly not indicative of a coronary event without the clinical setting of myocardial ischemia  Lactic acid, plasma [[id number]]  (Normal) Collected:  06/06/18 1942    Lab Status:  Final result Specimen:  Blood from Arm, Left Updated:  06/06/18 2020     LACTIC ACID 1 2 mmol/L     Narrative:         Result may be elevated if tourniquet was used during collection      CBC and differential [[id number]]  (Abnormal) Collected:  06/06/18 1942    Lab Status:  Final result Specimen:  Blood from Arm, Left Updated:  06/06/18 1953     WBC 14 08 (H) Thousand/uL      RBC 4 79 Million/uL      Hemoglobin 13 1 g/dL      Hematocrit 40 3 %      MCV 84 fL      MCH 27 3 pg      MCHC 32 5 g/dL      RDW 14 6 %      MPV 11 2 fL      Platelets 871 Thousands/uL      nRBC 0 /100 WBCs      Neutrophils Relative 63 %      Immat GRANS % 1 %      Lymphocytes Relative 28 %      Monocytes Relative 7 %      Eosinophils Relative 1 %      Basophils Relative 0 %      Neutrophils Absolute 8 97 (H) Thousands/µL      Immature Grans Absolute 0 07 Thousand/uL      Lymphocytes Absolute 3 96 Thousands/µL      Monocytes Absolute 0 91 Thousand/µL      Eosinophils Absolute 0 13 Thousand/µL      Basophils Absolute 0 04 Thousands/µL     UA w Reflex to Microscopic w Reflex to Culture [[id number]] Collected:  06/06/18 1914    Lab Status:  Final result Specimen:  Urine from Urine, Clean Catch Updated:  06/06/18 1921     Color, UA Yellow     Clarity, UA Slightly Cloudy     Specific Gravity, UA >=1 030     pH, UA 5 5     Leukocytes, UA Negative     Nitrite, UA Negative     Protein, UA Negative mg/dl      Glucose, UA Negative mg/dl      Ketones, UA Negative mg/dl      Urobilinogen, UA 0 2 E U /dl      Bilirubin, UA Negative     Blood, UA Negative    POCT pregnancy, urine [[id number]]  (Normal) Resulted:  06/06/18 1918    Lab Status:  Final result Updated:  06/06/18 1918     EXT PREG TEST UR (Ref: Negative) positive                 CT head without contrast   Final Result by Antonio Cheng DO (06/06 2117)      Old right occipital lobe infarct  No acute intracranial abnormality  Workstation performed: [id number]                    Procedures  Procedures       Phone Contacts  ED Phone Contact    ED Course  ED Course as of Jun 06 2326 Wed Jun 06, 2018   2213 TSH 3RD Marika Racer: (!) 4 192    patient was in shock when I told her that her pregnancy test was positive  Patient states that she will keep the baby or give up for adoption  She states her friend is 45 and she and her  have been trying to conceive and the are even trying  in vitro with no luck and that she would be the perfect parents for adoption  Patient's CT scan showed an old right post occipital infarct and a defect in the right parietal region where she had her surgery  There was no evidence of a bleed or an acute stroke  10:20 p m  I re-examined patient  Patient states her headache is totally gone as well as her numbness in her face  Patient states she feels fine to go home  I will have patient follow up with her neurologist   I instructed her to call neurologist tomorrow  I also instructed patient to quit smoking and to follow up with the obstetrician as soon as possible  I explained to patient that she will be a high risk pregnancy because of her age and also her moyamoya syndrome  I told patient that I would start her on some prenatal vitamins as well    I provided patient with some resources for crisis pregnancy Center as well as [facility] and the numbers [address]  I also encourage patient to take her thyroid medicine which she states she forgets to take at times  MDM  CritCare Time     Differential diagnosis includes:  1  Cephalgia   2  Left facial numbness  3  History of moyamoya syndrome  4  Status post right-sided brain surgery 2017  5  Abnormal menses  6  Rule out pregnancy    Disposition  Final diagnoses:   Left facial numbness - Resolved   Moyamoya disease   Cephalgia - Resolved   Early stage of pregnancy     Time reflects when diagnosis was documented in both MDM as applicable and the Disposition within this note     Time User Action Codes Description Comment    6/6/2018 10:34 PM Charleston Ramal L Add [R20 0] Left facial numbness     6/6/2018 10:34 PM Geradine Will Modify [R20 0] Left facial numbness Resolved    6/6/2018 10:35 PM Geradine Will Add [I67 5] Moyamoya disease     6/6/2018 10:35 PM Geradine Will Add [R51] Cephalgia     6/6/2018 10:35 PM Geradine Will Modify [R51] Cephalgia Resolved    6/6/2018 10:38 PM Geradine Will Add [Z34 90] Early stage of pregnancy       ED Disposition     ED Disposition Condition Comment    Discharge  [facility] discharge to home/self care      Condition at discharge: Good        Follow-up Information     Follow up With Specialties Details Why Contact Shyann Pleitez MD  In 2 days  [address]      Joanna Ibrahim MD Obstetrics and Gynecology, Obstetrics, Gynecology In 1 week  [address]  [address] [facility] [id number] [place] [address]  In 1 day Call your neurologist to be seen tomorrow; if unable call [facility], and tell them you were seen in our Emergency Dept and we wanted you to be rechecked     Edel Fabian MD Neurology   [facility] [address] [facility] [address]  [phone]            Discharge Medication List as of 6/6/2018 10:44 PM      START taking these medications    Details   Prenatal Vit-Fe Fumarate-FA (GOODSENSE PRENATAL VITAMINS) 28-0 8 MG TABS Take 1 tablet by mouth daily, Starting Wed 6/6/2018, Print         CONTINUE these medications which have NOT CHANGED    Details   aspirin-dipyridamole (AGGRENOX)  mg per 12 hr capsule Take 1 capsule by mouth every 12 (twelve) hours, Starting Tue 10/10/2017, Print      atorvastatin (LIPITOR) 40 mg tablet Take 1 tablet by mouth every evening, Starting Tue 10/10/2017, No Print      doxepin (SINEquan) 25 mg capsule Take 1 capsule by mouth daily at bedtime, Starting Tue 10/10/2017, No Print      gabapentin (NEURONTIN) 300 mg capsule Take 1 capsule by mouth 3 (three) times a day, Starting Tue 10/10/2017, No Print      levothyroxine 137 mcg tablet Take 1 tablet by mouth daily, Starting Tue 7/18/2017, Print      nicotine (NICODERM CQ) 21 mg/24 hr TD 24 hr patch Place 1 patch on the skin daily, Starting Wed 10/11/2017, Print      PARoxetine (PAXIL) 20 mg tablet Take 1 tablet by mouth daily, Starting Wed 10/11/2017, No Print      potassium chloride (K-DUR) 10 mEq tablet Take 1 tablet by mouth 2 (two) times a day, Starting Tue 7/18/2017, Print           No discharge procedures on file      ED Provider  Electronically Signed by           Marce Simpson,   06/06/18 [address] [facility], DO  06/06/18 3000 [facility], DO  06/06/18 2730

## 2018-06-07 LAB
ATRIAL RATE: 87 BPM
P AXIS: 67 DEGREES
PR INTERVAL: 138 MS
QRS AXIS: 90 DEGREES
QRSD INTERVAL: 68 MS
QT INTERVAL: 370 MS
QTC INTERVAL: 445 MS
T WAVE AXIS: 77 DEGREES
VENTRICULAR RATE: 87 BPM

## 2018-06-07 PROCEDURE — 93010 ELECTROCARDIOGRAM REPORT: CPT | Performed by: INTERNAL MEDICINE

## 2018-06-07 NOTE — DISCHARGE INSTRUCTIONS
Acute Headache   WHAT YOU NEED TO KNOW:   An acute headache is pain or discomfort that starts suddenly and gets worse quickly  You may have an acute headache only when you feel stress or eat certain foods  Other acute headache pain can happen every day, and sometimes several times a day  DISCHARGE INSTRUCTIONS:   Return to the emergency department if:   · You have severe pain  · You have numbness or weakness on one side of your face or body  · You have a headache that occurs after a blow to the head, a fall, or other trauma  · You have a headache, are forgetful or confused, or have trouble speaking  · You have a headache, stiff neck, and a fever  Contact your healthcare provider if:   · You have a constant headache and are vomiting  · You have a headache each day that does not get better, even after treatment  · You have changes in your headaches, or new symptoms that occur when you have a headache  · You have questions or concerns about your condition or care  Medicines: You may need any of the following:  · Prescription pain medicine  may be given  The medicine your healthcare provider recommends will depend on the kind of headaches you have  You will need to take prescription headache medicines as directed to prevent a problem called rebound headache  These headaches happen with regular use of pain relievers for headache disorders  · NSAIDs , such as ibuprofen, help decrease swelling, pain, and fever  This medicine is available with or without a doctor's order  NSAIDs can cause stomach bleeding or kidney problems in certain people  If you take blood thinner medicine, always ask your healthcare provider if NSAIDs are safe for you  Always read the medicine label and follow directions  · Acetaminophen  decreases pain and fever  It is available without a doctor's order  Ask how much to take and how often to take it  Follow directions   Read the labels of all other medicines you are using to see if they also contain acetaminophen, or ask your doctor or pharmacist  Acetaminophen can cause liver damage if not taken correctly  Do not use more than 3 grams (3,000 milligrams) total of acetaminophen in one day  · Antidepressants  may be given for some kinds of headaches  · Take your medicine as directed  Contact your healthcare provider if you think your medicine is not helping or if you have side effects  Tell him or her if you are allergic to any medicine  Keep a list of the medicines, vitamins, and herbs you take  Include the amounts, and when and why you take them  Bring the list or the pill bottles to follow-up visits  Carry your medicine list with you in case of an emergency  Manage your symptoms:   · Apply heat or ice  on the headache area  Use a heat or ice pack  For an ice pack, you can also put crushed ice in a plastic bag  Cover the pack or bag with a towel before you apply it to your skin  Ice and heat both help decrease pain, and heat also helps decrease muscle spasms  Apply heat for 20 to 30 minutes every 2 hours  Apply ice for 15 to 20 minutes every hour  Apply heat or ice for as long and for as many days as directed  You may alternate heat and ice  · Relax your muscles  Lie down in a comfortable position and close your eyes  Relax your muscles slowly  Start at your toes and work your way up your body  · Keep a record of your headaches  Write down when your headaches start and stop  Include your symptoms and what you were doing when the headache began  Record what you ate or drank for 24 hours before the headache started  Describe the pain and where it hurts  Keep track of what you did to treat your headache and if it worked  Prevent an acute headache:   · Avoid anything that triggers an acute headache  Examples include exposure to chemicals, going to high altitude, or not getting enough sleep  Create a regular sleep routine   Go to sleep at the same time and wake up at the same time each day  Do not use electronic devices before bedtime  These may trigger a headache or prevent you from sleeping well  · Do not smoke  Nicotine and other chemicals in cigarettes and cigars can trigger an acute headache or make it worse  Ask your healthcare provider for information if you currently smoke and need help to quit  E-cigarettes or smokeless tobacco still contain nicotine  Talk to your healthcare provider before you use these products  · Limit alcohol as directed  Alcohol can trigger an acute headache or make it worse  If you have cluster headaches, do not drink alcohol during an episode  For other types of headaches, ask your healthcare provider if it is safe for you to drink alcohol  Ask how much is safe for you to drink, and how often  · Exercise as directed  Exercise can reduce tension and help with headache pain  Aim for 30 minutes of physical activity on most days of the week  Your healthcare provider can help you create an exercise plan  · Eat a variety of healthy foods  Healthy foods include fruits, vegetables, low-fat dairy products, lean meats, fish, whole grains, and cooked beans  Your healthcare provider or dietitian can help you create meals plans if you need to avoid foods that trigger headaches  Follow up with your healthcare provider as directed:  Bring your headache record with you when you see your healthcare provider  Write down your questions so you remember to ask them during your visits  © 2017 [address]  Information is for End User's use only and may not be sold, redistributed or otherwise used for commercial purposes  All illustrations and images included in CareNotes® are the copyrighted property of [employer]  or [employer]  The above information is an  only  It is not intended as medical advice for individual conditions or treatments   Talk to your doctor, nurse or pharmacist before following any medical regimen to see if it is safe and effective for you  Acute Headache   WHAT YOU NEED TO KNOW:   An acute headache is pain or discomfort that starts suddenly and gets worse quickly  You may have an acute headache only when you feel stress or eat certain foods  Other acute headache pain can happen every day, and sometimes several times a day  DISCHARGE INSTRUCTIONS:   Return to the emergency department if:   · You have severe pain  · You have numbness or weakness on one side of your face or body  · You have a headache that occurs after a blow to the head, a fall, or other trauma  · You have a headache, are forgetful or confused, or have trouble speaking  · You have a headache, stiff neck, and a fever  Contact your healthcare provider if:   · You have a constant headache and are vomiting  · You have a headache each day that does not get better, even after treatment  · You have changes in your headaches, or new symptoms that occur when you have a headache  · You have questions or concerns about your condition or care  Medicines: You may need any of the following:  · Prescription pain medicine  may be given  The medicine your healthcare provider recommends will depend on the kind of headaches you have  You will need to take prescription headache medicines as directed to prevent a problem called rebound headache  These headaches happen with regular use of pain relievers for headache disorders  · NSAIDs , such as ibuprofen, help decrease swelling, pain, and fever  This medicine is available with or without a doctor's order  NSAIDs can cause stomach bleeding or kidney problems in certain people  If you take blood thinner medicine, always ask your healthcare provider if NSAIDs are safe for you  Always read the medicine label and follow directions  · Acetaminophen  decreases pain and fever  It is available without a doctor's order   Ask how much to take and how often to take it  Follow directions  Read the labels of all other medicines you are using to see if they also contain acetaminophen, or ask your doctor or pharmacist  Acetaminophen can cause liver damage if not taken correctly  Do not use more than 3 grams (3,000 milligrams) total of acetaminophen in one day  · Antidepressants  may be given for some kinds of headaches  · Take your medicine as directed  Contact your healthcare provider if you think your medicine is not helping or if you have side effects  Tell him or her if you are allergic to any medicine  Keep a list of the medicines, vitamins, and herbs you take  Include the amounts, and when and why you take them  Bring the list or the pill bottles to follow-up visits  Carry your medicine list with you in case of an emergency  Manage your symptoms:   · Apply heat or ice  on the headache area  Use a heat or ice pack  For an ice pack, you can also put crushed ice in a plastic bag  Cover the pack or bag with a towel before you apply it to your skin  Ice and heat both help decrease pain, and heat also helps decrease muscle spasms  Apply heat for 20 to 30 minutes every 2 hours  Apply ice for 15 to 20 minutes every hour  Apply heat or ice for as long and for as many days as directed  You may alternate heat and ice  · Relax your muscles  Lie down in a comfortable position and close your eyes  Relax your muscles slowly  Start at your toes and work your way up your body  · Keep a record of your headaches  Write down when your headaches start and stop  Include your symptoms and what you were doing when the headache began  Record what you ate or drank for 24 hours before the headache started  Describe the pain and where it hurts  Keep track of what you did to treat your headache and if it worked  Prevent an acute headache:   · Avoid anything that triggers an acute headache    Examples include exposure to chemicals, going to high altitude, or not getting enough sleep  Create a regular sleep routine  Go to sleep at the same time and wake up at the same time each day  Do not use electronic devices before bedtime  These may trigger a headache or prevent you from sleeping well  · Do not smoke  Nicotine and other chemicals in cigarettes and cigars can trigger an acute headache or make it worse  Ask your healthcare provider for information if you currently smoke and need help to quit  E-cigarettes or smokeless tobacco still contain nicotine  Talk to your healthcare provider before you use these products  · Limit alcohol as directed  Alcohol can trigger an acute headache or make it worse  If you have cluster headaches, do not drink alcohol during an episode  For other types of headaches, ask your healthcare provider if it is safe for you to drink alcohol  Ask how much is safe for you to drink, and how often  · Exercise as directed  Exercise can reduce tension and help with headache pain  Aim for 30 minutes of physical activity on most days of the week  Your healthcare provider can help you create an exercise plan  · Eat a variety of healthy foods  Healthy foods include fruits, vegetables, low-fat dairy products, lean meats, fish, whole grains, and cooked beans  Your healthcare provider or dietitian can help you create meals plans if you need to avoid foods that trigger headaches  Follow up with your healthcare provider as directed:  Bring your headache record with you when you see your healthcare provider  Write down your questions so you remember to ask them during your visits  © 2017 [address] Information is for End User's use only and may not be sold, redistributed or otherwise used for commercial purposes  All illustrations and images included in CareNotes® are the copyrighted property of [employer]  or [employer]  The above information is an  only   It is not intended as medical advice for individual conditions or treatments  Talk to your doctor, nurse or pharmacist before following any medical regimen to see if it is safe and effective for you  Pregnancy   WHAT YOU NEED TO KNOW:   A normal pregnancy lasts about 40 weeks  The first trimester lasts from your last period through the 12th week of pregnancy  The second trimester lasts from the 13th week of your pregnancy through the 23rd week  The third trimester lasts from your 24th week of pregnancy until your baby is born  If you know the date of your last period, your healthcare provider can estimate your due date  You may give birth to your baby any time from 37 weeks to 2 weeks after your due date  DISCHARGE INSTRUCTIONS:   Return to the emergency department if:   · You develop a severe headache that does not go away  · You have new or increased vision changes, such as blurred or spotted vision  · You have new or increased swelling in your face or hands  · You have pain or cramping in your abdomen or low back  · You have vaginal bleeding  Contact your healthcare provider or obstetrician if:   · You have abdominal cramps, pressure, or tightening  · You have a change in vaginal discharge  · You cannot keep food or drinks down, and you are losing weight  · You have chills or a fever  · You have vaginal itching, burning, or pain  · You have yellow, green, white, or foul-smelling vaginal discharge  · You have pain or burning when you urinate, less urine than usual, or pink or bloody urine  · You have questions or concerns about your condition or care  Medicines:   · Prenatal vitamins  provide some of the extra vitamins and minerals you need during pregnancy  Prenatal vitamins may also help to decrease the risk of certain birth defects  · Take your medicine as directed  Contact your healthcare provider if you think your medicine is not helping or if you have side effects   Tell him or her if you are allergic to any medicine  Keep a list of the medicines, vitamins, and herbs you take  Include the amounts, and when and why you take them  Bring the list or the pill bottles to follow-up visits  Carry your medicine list with you in case of an emergency  Follow up with your healthcare provider or obstetrician as directed:  Go to all of your prenatal visits during your pregnancy  Write down your questions so you remember to ask them during your visits  Body changes that may occur during your pregnancy:   · Breast changes  you will experience include tenderness and tingling during the early part of your pregnancy  Your breasts will become larger  You may need to use a support bra  You may see a thin, yellow fluid, called colostrum, leak from your nipples during the second trimester  Colostrum is a liquid that changes to milk about 3 days after you give birth  · Skin changes and stretch marks  may occur during your pregnancy  You may have red marks, called stretch marks, on your skin  Stretch marks will usually fade after pregnancy  Use lotion if your skin is dry and itchy  The skin on your face, around your nipples, and below your belly button may darken  Most of the time, your skin will return to its normal color after your baby is born  · Morning sickness  is nausea and vomiting that can happen at any time of day  Avoid fatty and spicy foods  Eat small meals throughout the day instead of large meals  Aida may help to decrease nausea  Ask your healthcare provider about other ways of decreasing nausea and vomiting  · Heartburn  may be caused by changes in your hormones during pregnancy  Your growing uterus may also push your stomach upward and force stomach acid to back up into your esophagus  Eat 4 or 5 small meals each day instead of large meals  Avoid spicy foods  Avoid eating right before bedtime  · Constipation  may develop during your pregnancy   To treat constipation, eat foods high in fiber such as fiber cereals, beans, fruits, vegetables, whole-grain breads, and prune juice  Get regular exercise and drink plenty of water  Your healthcare provider may also suggest a fiber supplement to soften your bowel movements  Talk to your healthcare provider before you use any medicines to decrease constipation  · Hemorrhoids  are enlarged veins in the rectal area  They may cause pain, itching, and bright red bleeding from your rectum  To decrease your risk of hemorrhoids, prevent constipation and do not strain to have a bowel movement  If you have hemorrhoids, soak in a tub of warm water to ease discomfort  Ask your healthcare provider how you can treat hemorrhoids  · Leg cramps and swelling  may be caused by low calcium levels or the added weight of pregnancy  Raise your legs above the level of your heart to decrease swelling  During a leg cramp, stretch or massage the muscle that has the cramp  Heat may help decrease pain and muscle spasms  Apply heat on your muscle for 20 to 30 minutes every 2 hours for as many days as directed  · Back pain  may occur as your baby grows  Do not stand for long periods of time or lift heavy items  Use good posture while you stand, squat, or bend  Wear low-heeled shoes with good support  Rest may also help to relieve back pain  Ask your healthcare provider about exercises you can do to strengthen your back muscles  Stay healthy during your pregnancy:   · Eat a variety of healthy foods  Healthy foods include fruits, vegetables, whole-grain breads, low-fat dairy foods, beans, lean meats, and fish  Drink liquids as directed  Ask how much liquid to drink each day and which liquids are best for you  Limit caffeine to less than 200 milligrams each day  Limit your intake of fish to 2 servings each week  Choose fish low in mercury such as canned light tuna, shrimp, crab, salmon, cod, or tilapia  Do not  eat fish high in mercury such as swordfish, tilefish, chavez mackerel, and shark  · Take prenatal vitamins as directed  Your need for certain vitamins and minerals, such as folic acid, increases during pregnancy  Prenatal vitamins provide some of the extra vitamins and minerals you need  Prenatal vitamins may also help to decrease the risk of certain birth defects  · Ask how much weight you should gain during your pregnancy  Too much or too little weight gain can be unhealthy for you and your baby  · Talk to your healthcare provider about exercise  Moderate exercise can help you stay fit  Your healthcare provider will help you plan an exercise program that is safe for you during pregnancy  · Do not smoke  If you smoke, it is never too late to quit  Smoking increases your risk of a miscarriage and other health problems during your pregnancy  Smoking can cause your baby to be born too early or weigh less at birth  Ask your healthcare provider for information if you need help quitting  · Do not drink alcohol  Alcohol passes from your body to your baby through the placenta  It can affect your baby's brain development and cause fetal alcohol syndrome (FAS)  FAS is a group of conditions that causes mental, behavior, and growth problems  · Talk to your healthcare provider before you take any medicines  Many medicines may harm your baby if you take them when you are pregnant  Do not take any medicines, vitamins, herbs, or supplements without first talking to your healthcare provider  Never use illegal or street drugs (such as marijuana or cocaine) while you are pregnant  Safety tips:   · Avoid hot tubs and saunas  Do not use a hot tub or sauna while you are pregnant, especially during your first trimester  Hot tubs and saunas may raise your baby's temperature and increase the risk of birth defects  · Avoid toxoplasmosis  This is an infection caused by eating raw meat or being around infected cat feces  It can cause birth defects, miscarriages, and other problems   Wash your hands after you touch raw meat  Make sure any meat is well-cooked before you eat it  Avoid raw eggs and unpasteurized milk  Use gloves or ask someone else to clean your cat's litter box while you are pregnant  · Ask your healthcare provider about travel  The most comfortable time to travel is during the second trimester  Ask your healthcare provider if you can travel after 36 weeks  You may not be able to travel in an airplane after 36 weeks  He may also recommend that you avoid long road trips  © 2017 [facility] Information is for End User's use only and may not be sold, redistributed or otherwise used for commercial purposes  All illustrations and images included in CareNotes® are the copyrighted property of [employer] M [employer]  or [address]  The above information is an  only  It is not intended as medical advice for individual conditions or treatments  Talk to your doctor, nurse or pharmacist before following any medical regimen to see if it is safe and effective for you

## 2018-06-22 ENCOUNTER — APPOINTMENT (OUTPATIENT)
Dept: LAB | Facility: MEDICAL CENTER | Age: 38
End: 2018-06-22
Payer: COMMERCIAL

## 2018-06-22 ENCOUNTER — OFFICE VISIT (OUTPATIENT)
Dept: OBGYN CLINIC | Facility: MEDICAL CENTER | Age: 38
End: 2018-06-22
Payer: COMMERCIAL

## 2018-06-22 VITALS — BODY MASS INDEX: 33.67 KG/M2 | WEIGHT: 178.2 LBS | DIASTOLIC BLOOD PRESSURE: 82 MMHG | SYSTOLIC BLOOD PRESSURE: 120 MMHG

## 2018-06-22 DIAGNOSIS — Z86.73 HISTORY OF CVA (CEREBROVASCULAR ACCIDENT): ICD-10-CM

## 2018-06-22 DIAGNOSIS — Z59.00 HOMELESS: ICD-10-CM

## 2018-06-22 DIAGNOSIS — I67.5 MOYAMOYA DISEASE: ICD-10-CM

## 2018-06-22 DIAGNOSIS — O46.90 VAGINAL BLEEDING IN PREGNANCY: ICD-10-CM

## 2018-06-22 DIAGNOSIS — O03.9 SPONTANEOUS ABORTION: Primary | ICD-10-CM

## 2018-06-22 LAB
ABO GROUP BLD: NORMAL
BASOPHILS # BLD AUTO: 0.05 THOUSANDS/ΜL (ref 0–0.1)
BASOPHILS NFR BLD AUTO: 0 % (ref 0–1)
BLD GP AB SCN SERPL QL: NEGATIVE
EOSINOPHIL # BLD AUTO: 0.15 THOUSAND/ΜL (ref 0–0.61)
EOSINOPHIL NFR BLD AUTO: 1 % (ref 0–6)
ERYTHROCYTE [DISTWIDTH] IN BLOOD BY AUTOMATED COUNT: 15.1 % (ref 11.6–15.1)
HCT VFR BLD AUTO: 35.1 % (ref 34.8–46.1)
HGB BLD-MCNC: 10.8 G/DL (ref 11.5–15.4)
IMM GRANULOCYTES # BLD AUTO: 0.09 THOUSAND/UL (ref 0–0.2)
IMM GRANULOCYTES NFR BLD AUTO: 1 % (ref 0–2)
LYMPHOCYTES # BLD AUTO: 3.73 THOUSANDS/ΜL (ref 0.6–4.47)
LYMPHOCYTES NFR BLD AUTO: 20 % (ref 14–44)
MCH RBC QN AUTO: 26.9 PG (ref 26.8–34.3)
MCHC RBC AUTO-ENTMCNC: 30.8 G/DL (ref 31.4–37.4)
MCV RBC AUTO: 88 FL (ref 82–98)
MONOCYTES # BLD AUTO: 0.79 THOUSAND/ΜL (ref 0.17–1.22)
MONOCYTES NFR BLD AUTO: 4 % (ref 4–12)
NEUTROPHILS # BLD AUTO: 14.28 THOUSANDS/ΜL (ref 1.85–7.62)
NEUTS SEG NFR BLD AUTO: 74 % (ref 43–75)
NRBC BLD AUTO-RTO: 0 /100 WBCS
PLATELET # BLD AUTO: 303 THOUSANDS/UL (ref 149–390)
PMV BLD AUTO: 11.3 FL (ref 8.9–12.7)
RBC # BLD AUTO: 4.01 MILLION/UL (ref 3.81–5.12)
RH BLD: POSITIVE
SPECIMEN EXPIRATION DATE: NORMAL
WBC # BLD AUTO: 19.09 THOUSAND/UL (ref 4.31–10.16)

## 2018-06-22 PROCEDURE — 86900 BLOOD TYPING SEROLOGIC ABO: CPT

## 2018-06-22 PROCEDURE — 86901 BLOOD TYPING SEROLOGIC RH(D): CPT

## 2018-06-22 PROCEDURE — 86850 RBC ANTIBODY SCREEN: CPT

## 2018-06-22 PROCEDURE — 88305 TISSUE EXAM BY PATHOLOGIST: CPT | Performed by: PATHOLOGY

## 2018-06-22 PROCEDURE — 99202 OFFICE O/P NEW SF 15 MIN: CPT | Performed by: OBSTETRICS & GYNECOLOGY

## 2018-06-22 PROCEDURE — 36415 COLL VENOUS BLD VENIPUNCTURE: CPT

## 2018-06-22 PROCEDURE — 85025 COMPLETE CBC W/AUTO DIFF WBC: CPT

## 2018-06-22 SDOH — ECONOMIC STABILITY - HOUSING INSECURITY: HOMELESSNESS UNSPECIFIED: Z59.00

## 2018-06-22 NOTE — PROGRESS NOTES
EARLY PREGNANCY ULTRASOUND    SUBJECTIVE    HPI: Karlene De León is a 34 y o   female here today for early pregnancy ultrasound  Patient's last menstrual period was 2018  Menses are regular  This pregnancy was unplanned  Brain Ee is a poor historian  She thinks her LMP was in May, but cannot be certain  She reports that she started having heavy bleeding with "lots" of tissue yesterday as well as cramping and is also having bleeding still  She appears uncomfortable  She denies seeing any fetal tissue passage  She has a complicated neurologic history - she reports stopping her aspirin ~ 1 week ago, and has also stopped her other meds besides PNV because she ran out  She has follow up with her neurologist and PCP next week  Has not seen gyn recently  Had pos UPT in ED earlier this month  OBHx is significant for VIP x 1,  x 1  Taking a prenatal vitamin       Allergies   Allergen Reactions    Penicillin G Hives       Current Outpatient Prescriptions:     Prenatal Vit-Fe Fumarate-FA ([facility] PREN[facility]INS) 28-0 8 MG TABS, Take 1 tablet by mouth daily, Disp: 60 tablet, Rfl: 0    atorvastatin (LIPITOR) 40 mg tablet, Take 1 tablet by mouth every evening, Disp: , Rfl: 0    doxepin (SINEquan) 25 mg capsule, Take 1 capsule by mouth daily at bedtime, Disp: , Rfl: 0    gabapentin (NEURONTIN) 300 mg capsule, Take 1 capsule by mouth 3 (three) times a day, Disp: , Rfl: 0    levothyroxine 137 mcg tablet, Take 1 tablet by mouth daily, Disp: 30 tablet, Rfl: 0    nicotine (NICODERM CQ) 21 mg/24 hr TD 24 hr patch, Place 1 patch on the skin daily, Disp: 28 patch, Rfl: 0    PARoxetine (PAXIL) 20 mg tablet, Take 1 tablet by mouth daily, Disp: , Rfl: 0    potassium chloride (K-DUR) 10 mEq tablet, Take 1 tablet by mouth 2 (two) times a day, Disp: 20 tablet, Rfl: 0      OBJECTIVE  Vitals:    18 1157   BP: 120/82   Weight: 80 8 kg (178 lb 3 2 oz)         Early OB Ultrasound Procedure Note: Transvaginal US    Technician: Study performed by the interpreting physician    Indications:  Early gestation, dating & viability    Procedure Details   The entire study was done at settings of 6 0 to 8 0 MHz  Enlarged retroverted uterus  + thickening of endometrial stripe    Findings:  Suspect spontaneous miscarriage, uncertain GA due to enlarged uterus/tissue as below  PELVIC EXAM:    Speculum:  Cervix visually dilated 1-1 5cm with + clots/tissue at introitus, consistent with placental tissue/membranes  This was grasped and removed in several pieces using ring forceps  Several large 1-2cm pieces of likely placenta tissue noted, these were sent to pathology for evaluation  ASSESSMENT  Spontaneous   Retained placenta/tissue      PLAN    1 - 800mcg of cytotec placed PV with patient permission, she was also given 1 tablet of 200mcg for use if bleeding continues to be heavy  2 - will check CBC and T&S as patient is uncertain of blood type  3 - Return in 1 week for repeat US/follow up, discuss contraception at that time (LARC?)   4 - bleeding precautions reviewed with Shavonne Miller, she is aware she should be seen in the ED if this continues    All questions were answered & patient expressed understanding      Alexandra Lau MD

## 2018-07-06 ENCOUNTER — APPOINTMENT (EMERGENCY)
Dept: CT IMAGING | Facility: HOSPITAL | Age: 38
End: 2018-07-06
Payer: COMMERCIAL

## 2018-07-06 ENCOUNTER — HOSPITAL ENCOUNTER (EMERGENCY)
Facility: HOSPITAL | Age: 38
Discharge: HOME | End: 2018-07-06
Attending: EMERGENCY MEDICINE | Admitting: EMERGENCY MEDICINE
Payer: COMMERCIAL

## 2018-07-06 ENCOUNTER — TELEPHONE (OUTPATIENT)
Dept: OBGYN CLINIC | Facility: CLINIC | Age: 38
End: 2018-07-06

## 2018-07-06 VITALS
HEART RATE: 73 BPM | WEIGHT: 179.01 LBS | SYSTOLIC BLOOD PRESSURE: 106 MMHG | TEMPERATURE: 98.1 F | OXYGEN SATURATION: 100 % | RESPIRATION RATE: 16 BRPM | DIASTOLIC BLOOD PRESSURE: 57 MMHG | BODY MASS INDEX: 33.82 KG/M2

## 2018-07-06 DIAGNOSIS — R51.9 HEADACHE: Primary | ICD-10-CM

## 2018-07-06 PROCEDURE — 70450 CT HEAD/BRAIN W/O DYE: CPT

## 2018-07-06 PROCEDURE — 99284 EMERGENCY DEPT VISIT MOD MDM: CPT

## 2018-07-06 NOTE — ED PROVIDER NOTES
History  Chief Complaint   Patient presents with    Headache     pt co of L sided headache onset today about 2pm " i have condiditon that causes mini strokes and I think I passed out today", pt with hx migranes, brain surgery 1 year ago      HPI patient is a 49-year-old female, history of mini strokes, apparently episodes of migraines, reports some left-sided headache today  Headache started approximately 2:00 p m  Jerryl Rm Patient denies any injury  There is no fall  She denies any anticoagulants  She denies any focal weakness  Patient reports she has had similar headaches in the past but they seem to resolve spontaneously  Patient was concerned today so she came to the hospital   She denies any focal weakness  She denies any difficulty speaking  There is no difficulty ambulating  Past medical history history of CVA, history of vascular problems  Family history noncontributory  Social history, smoker, currently homeless    Prior to Admission Medications   Prescriptions Last Dose Informant Patient Reported? Taking?    PARoxetine (PAXIL) 20 mg tablet   No No   Sig: Take 1 tablet by mouth daily   Prenatal Vit-Fe Fumarate-FA (GOODSENSE PRENATAL VITAMINS) 28-0 8 MG TABS   No No   Sig: Take 1 tablet by mouth daily   atorvastatin (LIPITOR) 40 mg tablet   No No   Sig: Take 1 tablet by mouth every evening   doxepin (SINEquan) 25 mg capsule   No No   Sig: Take 1 capsule by mouth daily at bedtime   gabapentin (NEURONTIN) 300 mg capsule   No No   Sig: Take 1 capsule by mouth 3 (three) times a day   levothyroxine 137 mcg tablet   No No   Sig: Take 1 tablet by mouth daily   nicotine (NICODERM CQ) 21 mg/24 hr TD 24 hr patch   No No   Sig: Place 1 patch on the skin daily   potassium chloride (K-DUR) 10 mEq tablet   No No   Sig: Take 1 tablet by mouth 2 (two) times a day      Facility-Administered Medications: None       Past Medical History:   Diagnosis Date    History of CVA (cerebrovascular accident)     Moyamoya disease     Tobacco use        Past Surgical History:   Procedure Laterality Date    BRAIN SURGERY      BRAIN SURGERY  04/10/2017    [id number]       Family History   Problem Relation Age of Onset    Depression Mother     Heart disease Father     Hypertension Father     Diabetes Father     Breast cancer Maternal Grandmother      I have reviewed and agree with the history as documented  Social History   Substance Use Topics    Smoking status: Current Every Day Smoker     Packs/day: 0 50     Types: Cigarettes    Smokeless tobacco: Never Used    Alcohol use No        Review of Systems   Constitutional: Negative for diaphoresis, fatigue and fever  HENT: Negative for congestion, ear pain, nosebleeds and sore throat  Eyes: Negative for photophobia, pain, discharge and visual disturbance  Respiratory: Negative for cough, choking, chest tightness, shortness of breath and wheezing  Cardiovascular: Negative for chest pain and palpitations  Gastrointestinal: Negative for abdominal distention, abdominal pain, diarrhea and vomiting  Genitourinary: Negative for dysuria, flank pain and frequency  Musculoskeletal: Negative for back pain, gait problem and joint swelling  Skin: Negative for color change and rash  Neurological: Positive for headaches  Negative for dizziness and syncope  Psychiatric/Behavioral: Negative for behavioral problems and confusion  The patient is not nervous/anxious  All other systems reviewed and are negative  Physical Exam  Physical Exam   Constitutional: She is oriented to person, place, and time  She appears well-developed and well-nourished  HENT:   Head: Normocephalic  Right Ear: External ear normal    Left Ear: External ear normal    Nose: Nose normal    Mouth/Throat: Oropharynx is clear and moist    Eyes: EOM and lids are normal  Pupils are equal, round, and reactive to light  Neck: Normal range of motion  Neck supple     Cardiovascular: Normal rate, regular rhythm, normal heart sounds and intact distal pulses  Pulmonary/Chest: Effort normal and breath sounds normal  No respiratory distress  Abdominal: Soft  Bowel sounds are normal    Musculoskeletal: Normal range of motion  She exhibits no deformity  Neurological: She is alert and oriented to person, place, and time  She has normal strength  No cranial nerve deficit or sensory deficit  Coordination normal  GCS eye subscore is 4  GCS verbal subscore is 5  GCS motor subscore is 6  Normal gait   Skin: Skin is warm and dry  Psychiatric: She has a normal mood and affect  Nursing note and vitals reviewed  pulse oximetry 100% on room air adequate oxygenation, no hypoxia    Vital Signs  ED Triage Vitals   Temperature Pulse Respirations Blood Pressure SpO2   07/06/18 1949 07/06/18 1946 07/06/18 1946 07/06/18 1946 07/06/18 1946   98 1 °F (36 7 °C) 81 16 118/68 100 %      Temp Source Heart Rate Source Patient Position - Orthostatic VS BP Location FiO2 (%)   07/06/18 1949 07/06/18 1946 07/06/18 1946 07/06/18 1946 --   Oral Monitor Sitting Right arm       Pain Score       07/06/18 1946       9           Vitals:    07/06/18 1946 07/06/18 2146   BP: 118/68 106/57   Pulse: 81 73   Patient Position - Orthostatic VS: Sitting Lying       Visual Acuity  Visual Acuity      Most Recent Value   L Pupil Size (mm)  3   R Pupil Size (mm)  3          ED Medications  Medications - No data to display    Diagnostic Studies  Results Reviewed     None                 CT head without contrast   Final Result by Julius Gatica MD (07/06 2156)      No acute intracranial abnormality  Workstation performed: [id number]                    Procedures  Procedures       Phone Contacts  ED Phone Contact    ED Course        CT scan of the brain was normal   patient was able to sleep, she is in no pain on discharge                        MDM medical decision making 53-year-old female presents with headache, normal CT scan normal neurological exam, pain is improved after patient was able to sleep  Discussed outpatient treatment and follow-up, discussed indications to return  No indication for admission or further diagnostic testing  CritCare Time    Disposition  Final diagnoses:   Headache     Time reflects when diagnosis was documented in both MDM as applicable and the Disposition within this note     Time User Action Codes Description Comment    7/6/2018 10:24 PM Alvina Vanegas Add [R51] Headache       ED Disposition     ED Disposition Condition Comment    Discharge  Johns Come discharge to home/self care  Condition at discharge: Good        Follow-up Information     Follow up With Specialties Details Why Contact Info    Anderson Campos MD Family Medicine   [address] [place] [id number] [phone]            Discharge Medication List as of 7/6/2018 10:24 PM      CONTINUE these medications which have NOT CHANGED    Details   atorvastatin (LIPITOR) 40 mg tablet Take 1 tablet by mouth every evening, Starting Tue 10/10/2017, No Print      doxepin (SINEquan) 25 mg capsule Take 1 capsule by mouth daily at bedtime, Starting Tue 10/10/2017, No Print      gabapentin (NEURONTIN) 300 mg capsule Take 1 capsule by mouth 3 (three) times a day, Starting Tue 10/10/2017, No Print      levothyroxine 137 mcg tablet Take 1 tablet by mouth daily, Starting Tue 7/18/2017, Print      nicotine (NICODERM CQ) 21 mg/24 hr TD 24 hr patch Place 1 patch on the skin daily, Starting Wed 10/11/2017, Print      PARoxetine (PAXIL) 20 mg tablet Take 1 tablet by mouth daily, Starting Wed 10/11/2017, No Print      potassium chloride (K-DUR) 10 mEq tablet Take 1 tablet by mouth 2 (two) times a day, Starting Tue 7/18/2017, Print      Prenatal Vit-Fe Fumarate-FA (PostHelpersENSE PRENATAL VITAMINS) 28-0 8 MG TABS Take 1 tablet by mouth daily, Starting Wed 6/6/2018, Print           No discharge procedures on file      ED Provider  Electronically Signed by           Frank Dumas MD  07/07/18 2261

## 2018-07-07 ENCOUNTER — HOSPITAL ENCOUNTER (EMERGENCY)
Facility: HOSPITAL | Age: 38
Discharge: HOME | End: 2018-07-07
Attending: EMERGENCY MEDICINE
Payer: COMMERCIAL

## 2018-07-07 VITALS
RESPIRATION RATE: 20 BRPM | HEART RATE: 77 BPM | HEIGHT: 61 IN | TEMPERATURE: 98.1 F | SYSTOLIC BLOOD PRESSURE: 108 MMHG | OXYGEN SATURATION: 100 % | WEIGHT: 180 LBS | BODY MASS INDEX: 33.99 KG/M2 | DIASTOLIC BLOOD PRESSURE: 73 MMHG

## 2018-07-07 DIAGNOSIS — R51.9 HEADACHE: Primary | ICD-10-CM

## 2018-07-07 LAB
ANION GAP BLD CALC-SCNC: 17 MMOL/L (ref 4–13)
BACTERIA UR QL AUTO: ABNORMAL /HPF
BILIRUB UR QL STRIP: NEGATIVE
BUN BLD-MCNC: 14 MG/DL (ref 5–25)
CA-I BLD-SCNC: 1.13 MMOL/L (ref 1.12–1.32)
CAOX CRY URNS QL MICRO: ABNORMAL /HPF
CHLORIDE BLD-SCNC: 106 MMOL/L (ref 100–108)
CLARITY UR: CLEAR
COLOR UR: YELLOW
CREAT BLD-MCNC: 0.6 MG/DL (ref 0.6–1.3)
EXT PREG TEST URINE: NORMAL
GFR SERPL CREATININE-BSD FRML MDRD: 114 ML/MIN/1.73SQ M
GLUCOSE SERPL-MCNC: 103 MG/DL (ref 65–140)
GLUCOSE UR STRIP-MCNC: NEGATIVE MG/DL
HCG SERPL QL: POSITIVE
HCT VFR BLD CALC: 29 % (ref 34.8–46.1)
HGB BLDA-MCNC: 9.9 G/DL (ref 11.5–15.4)
HGB UR QL STRIP.AUTO: NEGATIVE
KETONES UR STRIP-MCNC: NEGATIVE MG/DL
LEUKOCYTE ESTERASE UR QL STRIP: NEGATIVE
NITRITE UR QL STRIP: NEGATIVE
NON-SQ EPI CELLS URNS QL MICRO: ABNORMAL /HPF
PCO2 BLD: 22 MMOL/L (ref 21–32)
PH UR STRIP.AUTO: 5.5 [PH] (ref 4.5–8)
POTASSIUM BLD-SCNC: 3.8 MMOL/L (ref 3.5–5.3)
PROT UR STRIP-MCNC: ABNORMAL MG/DL
RBC #/AREA URNS AUTO: ABNORMAL /HPF
SODIUM BLD-SCNC: 140 MMOL/L (ref 136–145)
SP GR UR STRIP.AUTO: >=1.03 (ref 1–1.03)
SPECIMEN SOURCE: ABNORMAL
UROBILINOGEN UR QL STRIP.AUTO: 0.2 E.U./DL
WBC #/AREA URNS AUTO: ABNORMAL /HPF

## 2018-07-07 PROCEDURE — 81001 URINALYSIS AUTO W/SCOPE: CPT | Performed by: EMERGENCY MEDICINE

## 2018-07-07 PROCEDURE — 84703 CHORIONIC GONADOTROPIN ASSAY: CPT | Performed by: EMERGENCY MEDICINE

## 2018-07-07 PROCEDURE — 36415 COLL VENOUS BLD VENIPUNCTURE: CPT | Performed by: EMERGENCY MEDICINE

## 2018-07-07 PROCEDURE — 80047 BASIC METABLC PNL IONIZED CA: CPT

## 2018-07-07 PROCEDURE — 81025 URINE PREGNANCY TEST: CPT | Performed by: EMERGENCY MEDICINE

## 2018-07-07 PROCEDURE — 85014 HEMATOCRIT: CPT

## 2018-07-07 PROCEDURE — 99283 EMERGENCY DEPT VISIT LOW MDM: CPT

## 2018-07-07 RX ORDER — ASPIRIN 81 MG/1
324 TABLET, CHEWABLE ORAL DAILY
Qty: 20 TABLET | Refills: 0 | Status: SHIPPED | OUTPATIENT
Start: 2018-07-07 | End: 2018-10-12 | Stop reason: HOSPADM

## 2018-07-07 RX ORDER — ACETAMINOPHEN 325 MG/1
650 TABLET ORAL ONCE
Status: COMPLETED | OUTPATIENT
Start: 2018-07-07 | End: 2018-07-07

## 2018-07-07 RX ADMIN — ACETAMINOPHEN 650 MG: 325 TABLET, FILM COATED ORAL at 04:18

## 2018-07-07 NOTE — ED PROVIDER NOTES
History  Chief Complaint   Patient presents with    Headache     Pt presents to ED with headache  Was seen here a few hours ago for the same thing      37yo femaale p/w re-eval for HA, was seen here earlier, had no new neuro sx and had CT scan of head  Was d/c but pt may have not actually left the premises  Pt is currently homeless and is living in a tent  History provided by:  Patient  Headache   Pain location:  Generalized  Quality:  Dull  Radiates to:  Does not radiate  Severity currently:  7/10  Severity at highest:  7/10  Duration:  1 day  Timing:  Constant  Progression:  Waxing and waning  Chronicity:  Recurrent  Similar to prior headaches: yes (Pt has h/o myoamyoa disease that caused a stroke, had neurosurgical fixation done a few years ago, has some memory issues and slow speech as a result from the prior stroke and recurrent HAs)    Context: emotional stress    Context comment:  Pt took medications chronically for her HA, but recently when she was here she had a positive pregnancy test and stopped all of her meds b/c she was pregnant, but did recently just have a miscarriage and was seeing OB, but hasn't resumed her medicaitons     Relieved by:  Nothing  Worsened by:  Sound (standing up, pt had heavier bleeding with the miscarriage and now has light bleeding)  Ineffective treatments:  None tried  Associated symptoms: dizziness, fatigue and nausea    Associated symptoms: no abdominal pain, no back pain, no blurred vision, no congestion, no cough, no diarrhea, no fever, no focal weakness, no hearing loss, no loss of balance, no myalgias, no near-syncope, no neck pain, no neck stiffness, no numbness, no seizures, no sore throat, no swollen glands, no syncope, no tingling, no visual change, no vomiting and no weakness    Dizziness:     Severity:  Moderate    Duration:  2 days    Timing:  Intermittent    Progression:  Waxing and waning      Prior to Admission Medications   Prescriptions Last Dose Informant Patient Reported? Taking? PARoxetine (PAXIL) 20 mg tablet   No No   Sig: Take 1 tablet by mouth daily   Prenatal Vit-Fe Fumarate-FA (GOODSENSE PRENATAL VITAMINS) 28-0 8 MG TABS   No No   Sig: Take 1 tablet by mouth daily   atorvastatin (LIPITOR) 40 mg tablet   No No   Sig: Take 1 tablet by mouth every evening   doxepin (SINEquan) 25 mg capsule   No No   Sig: Take 1 capsule by mouth daily at bedtime   gabapentin (NEURONTIN) 300 mg capsule   No No   Sig: Take 1 capsule by mouth 3 (three) times a day   levothyroxine 137 mcg tablet   No No   Sig: Take 1 tablet by mouth daily   nicotine (NICODERM CQ) 21 mg/24 hr TD 24 hr patch   No No   Sig: Place 1 patch on the skin daily   potassium chloride (K-DUR) 10 mEq tablet   No No   Sig: Take 1 tablet by mouth 2 (two) times a day      Facility-Administered Medications: None       Past Medical History:   Diagnosis Date    History of CVA (cerebrovascular accident)     Moyamoya disease     Tobacco use        Past Surgical History:   Procedure Laterality Date    BRAIN SURGERY      BRAIN SURGERY  04/10/2017    moyamoya       Family History   Problem Relation Age of Onset    Depression Mother     Heart disease Father     Hypertension Father     Diabetes Father     Breast cancer Maternal Grandmother      I have reviewed and agree with the history as documented  Social History   Substance Use Topics    Smoking status: Current Every Day Smoker     Packs/day: 0 50     Types: Cigarettes    Smokeless tobacco: Never Used    Alcohol use No        Review of Systems   Constitutional: Positive for fatigue  Negative for fever  HENT: Negative for congestion, hearing loss and sore throat  Eyes: Negative for blurred vision  Respiratory: Negative for cough  Cardiovascular: Negative for syncope and near-syncope  Gastrointestinal: Positive for nausea  Negative for abdominal pain, diarrhea and vomiting     Musculoskeletal: Negative for back pain, myalgias, neck pain and neck stiffness  Neurological: Positive for dizziness and headaches  Negative for focal weakness, seizures, weakness, numbness and loss of balance  All other systems reviewed and are negative  Physical Exam  Physical Exam   Constitutional: She is oriented to person, place, and time  She appears well-developed and well-nourished  HENT:   Head: Normocephalic and atraumatic  Eyes: EOM are normal  Pupils are equal, round, and reactive to light  Neck: Normal range of motion  Neck supple  Cardiovascular: Normal rate and regular rhythm  Pulmonary/Chest: Effort normal and breath sounds normal  No respiratory distress  She has no wheezes  Abdominal: Soft  Bowel sounds are normal  She exhibits no distension  There is no tenderness  Musculoskeletal: She exhibits no tenderness or deformity  Neurological: She is alert and oriented to person, place, and time  No cranial nerve deficit or sensory deficit  She exhibits normal muscle tone  Coordination normal  GCS eye subscore is 4  GCS verbal subscore is 5  GCS motor subscore is 6  Pt is slower to respond and at time searches for words which is her baseline, and sometimes has harder time with memory   Skin: There is pallor  Vitals reviewed        Vital Signs  ED Triage Vitals [07/07/18 0256]   Temperature Pulse Respirations Blood Pressure SpO2   98 1 °F (36 7 °C) 62 16 119/76 100 %      Temp Source Heart Rate Source Patient Position - Orthostatic VS BP Location FiO2 (%)   Oral Monitor Lying Right arm --      Pain Score       Worst Possible Pain           Vitals:    07/07/18 0256 07/07/18 0458   BP: 119/76 108/73   Pulse: 62 77   Patient Position - Orthostatic VS: Lying Lying       Visual Acuity      ED Medications  Medications   acetaminophen (TYLENOL) tablet 650 mg (650 mg Oral Given 7/7/18 0410)       Diagnostic Studies  Results Reviewed     Procedure Component Value Units Date/Time    hCG, qualitative pregnancy [[id number]]  (Abnormal) Collected:  07/07/18 0705    Lab Status:  Final result Specimen:  Blood from Arm, Right Updated:  07/07/18 0731     Preg, Serum Positive (A)    UA w Reflex to Microscopic [[id number]]  (Abnormal) Collected:  07/07/18 0706    Lab Status:  Final result Specimen:  Urine Updated:  07/07/18 0708     Color, UA Yellow     Clarity, UA Clear     Specific Gravity, UA >=1 030     pH, UA 5 5     Leukocytes, UA Negative     Nitrite, UA Negative     Protein, UA Trace (A) mg/dl      Glucose, UA Negative mg/dl      Ketones, UA Negative mg/dl      Urobilinogen, UA 0 2 E U /dl      Bilirubin, UA Negative     Blood, UA Negative    Urine Microscopic [[id number]] Collected:  07/07/18 0706    Lab Status: In process Specimen:  Urine Updated:  07/07/18 0708    POCT pregnancy, urine [[id number]]  (Normal) Resulted:  07/07/18 0649    Lab Status:  Final result Updated:  07/07/18 0649     EXT PREG TEST UR (Ref: Negative) pos    POCT Chem 8+ [[id number]]  (Abnormal) Collected:  07/07/18 0536    Lab Status:  Final result Updated:  07/07/18 0540     SODIUM, I-STAT 140 mmol/l      Potassium, i-STAT 3 8 mmol/L      Chloride, istat 106 mmol/L      CO2, i-STAT 22 mmol/L      Anion Gap, Istat 17 (H) mmol/L      Calcium, Ionized i-STAT 1 13 mmol/L      BUN, I-STAT 14 mg/dl      Creatinine, i-STAT 0 6 mg/dl      eGFR 114 ml/min/1 73sq m      Glucose, i-STAT 103 mg/dl      Hct, i-STAT 29 (L) %      Hgb, i-STAT 9 9 (L) g/dl      Specimen Type VENOUS                 No orders to display              Procedures  Procedures       Phone Contacts  ED Phone Contact    ED Course  ED Course as of Jul 17 1810   Sat Jul 07, 2018   0726 Discussed with Ob that patient still had a positive pregnancy test   She was supposed to have follow-up for her miscarriage, but missed the appointment due to ride  She is supposed to call and they have been calling her to rearrange a new appointment but she has not been able to do so yet    Discussed with them whether not he should do a quant for trending, and OB said it would not really be specifically helpful if they did not have another value to measure  However 1 was already drawn so if need be it is therefore trending but currently would not change any management  Patient needs to call on Monday to arrange follow-up for the following week to get a repeat ultrasound to evaluate for products of conception which they state even if her quant was negative could still be there, so patient needs and not emergent ultrasound at this time but follow up with OB and an ultrasound this coming week  I discussed with this and related at length with patient  Discussed the patient her mild anemia  Patient's headache is actually improved with the Tylenol  She has no neurologic symptoms  She had felt a little weak and dizzy which may be related to her anemia which I discussed with her and I discussed with her taking iron  Discussed with her worsening signs and symptoms to return to the emergency department for and patient indicated understanding  MDM  Number of Diagnoses or Management Options  Headache: established and worsening     Amount and/or Complexity of Data Reviewed  Decide to obtain previous medical records or to obtain history from someone other than the patient: yes  Review and summarize past medical records: yes (Pt seen here earlier and had CT scan and recently seen by OB for pregnancy and miscarriage )  Discuss the patient with other providers: yes      CritCare Time    Disposition  Final diagnoses:   Headache     Time reflects when diagnosis was documented in both MDM as applicable and the Disposition within this note     Time User Action Codes Description Comment    7/7/2018  6:17 AM Ita Almeida, [address] Headache       ED Disposition     ED Disposition Condition Comment    Discharge  Tomy Maravilla discharge to home/self care      Condition at discharge: Good        Follow-up Information     Follow up With Specialties Details Why [address] [facility], Petra Ramos MD Family Medicine   [facility]  [address]  [address] [address] [phone]            Patient's Medications   Discharge Prescriptions    ASPIRIN 81 MG CHEWABLE TABLET    Chew 4 tablets (324 mg total) daily       Start Date: 7/7/2018  End Date: --       Order Dose: 324 mg       Quantity: 20 tablet    Refills: 0     No discharge procedures on file      ED Provider  Electronically Signed by           Richie Ro MD  07/17/18 9333

## 2018-07-07 NOTE — DISCHARGE INSTRUCTIONS
Acute Headache, Ambulatory Care   GENERAL INFORMATION:   An acute headache  is pain or discomfort that starts suddenly and gets worse quickly  The cause of an acute headache may not be known  It may be triggered by stress, fatigue, hormones, food, or trauma  Common related symptoms include the following:   · Fever    · Sinus pressure    · Loss of memory    · Nausea or vomiting    · Problems with your vision, such as watery or red eyes, loss of vision, or pain in bright light    · Stiff neck    · Tenderness of the head and neck area    · Trouble staying awake, or being less alert than usual     · Weakness or less energy  Seek immediate care for the following symptoms:   · Severe pain    · A headache that occurs after a blow to the head, a fall, or other trauma     · Confusion or forgetfulness    · Numbness on one side of your face or body  Treatment for an acute headache  may include medicine to decrease pain  You may also need biofeedback or cognitive behavioral therapy  Ask your healthcare provider about these and other treatments for an acute headache  Manage my symptoms:   · Apply heat  on your head for 20 to 30 minutes every 2 hours for as many days as directed  Heat helps decrease pain and muscle spasms  You may alternate heat and ice  · Apply ice  on your head for 15 to 20 minutes every hour or as directed  Use an ice pack, or put crushed ice in a plastic bag  Cover it with a towel  Ice helps decrease pain  · Relax your muscles  Lie down in a comfortable position and close your eyes  Relax your muscles slowly  Start at your toes and work your way up your body  · Keep a record of your headaches  Write down when your headaches start and stop  Include your symptoms and what you were doing when the headache began  Record what you ate or drank for 24 hours before the headache started  Describe the pain and where it hurts  Keep track of what you did to treat your headache and whether it worked    Follow up with your healthcare provider as directed:  Bring your headache record with you when you see your healthcare provider  Write down your questions so you remember to ask them during your visits  CARE AGREEMENT:   You have the right to help plan your care  Learn about your health condition and how it may be treated  Discuss treatment options with your caregivers to decide what care you want to receive  You always have the right to refuse treatment  The above information is an  only  It is not intended as medical advice for individual conditions or treatments  Talk to your doctor, nurse or pharmacist before following any medical regimen to see if it is safe and effective for you  © 2014 [address] is for End User's use only and may not be sold, redistributed or otherwise used for commercial purposes  All illustrations and images included in CareNotes® are the copyrighted property of A D A M , [employer]  or Scar Barra[employer]

## 2018-07-07 NOTE — ED NOTES
Pt states "something doesn't feel right, I feel shaky, dwight never had seizures before but something's not right", VSS, nurse notified          Macario Schwab [place]  07/07/18 5290

## 2018-07-07 NOTE — ED NOTES
Discharge instructions and medications reviewed with pt  Pt verbalized understanding, with no further questions at this time  Pt ambulatory out of department, using steady gait, alone       Shanika Schreiber RN  07/06/18 4012

## 2018-07-07 NOTE — DISCHARGE INSTRUCTIONS
Follow up with your physician     Acute Headache   WHAT YOU NEED TO KNOW:   An acute headache is pain or discomfort that starts suddenly and gets worse quickly  You may have an acute headache only when you feel stress or eat certain foods  Other acute headache pain can happen every day, and sometimes several times a day  DISCHARGE INSTRUCTIONS:   Return to the emergency department if:   · You have severe pain  · You have numbness or weakness on one side of your face or body  · You have a headache that occurs after a blow to the head, a fall, or other trauma  · You have a headache, are forgetful or confused, or have trouble speaking  · You have a headache, stiff neck, and a fever  Contact your healthcare provider if:   · You have a constant headache and are vomiting  · You have a headache each day that does not get better, even after treatment  · You have changes in your headaches, or new symptoms that occur when you have a headache  · You have questions or concerns about your condition or care  Medicines: You may need any of the following:  · Prescription pain medicine  may be given  The medicine your healthcare provider recommends will depend on the kind of headaches you have  You will need to take prescription headache medicines as directed to prevent a problem called rebound headache  These headaches happen with regular use of pain relievers for headache disorders  · NSAIDs , such as ibuprofen, help decrease swelling, pain, and fever  This medicine is available with or without a doctor's order  NSAIDs can cause stomach bleeding or kidney problems in certain people  If you take blood thinner medicine, always ask your healthcare provider if NSAIDs are safe for you  Always read the medicine label and follow directions  · Acetaminophen  decreases pain and fever  It is available without a doctor's order  Ask how much to take and how often to take it  Follow directions   Read the labels of all other medicines you are using to see if they also contain acetaminophen, or ask your doctor or pharmacist  Acetaminophen can cause liver damage if not taken correctly  Do not use more than 3 grams (3,000 milligrams) total of acetaminophen in one day  · Antidepressants  may be given for some kinds of headaches  · Take your medicine as directed  Contact your healthcare provider if you think your medicine is not helping or if you have side effects  Tell him or her if you are allergic to any medicine  Keep a list of the medicines, vitamins, and herbs you take  Include the amounts, and when and why you take them  Bring the list or the pill bottles to follow-up visits  Carry your medicine list with you in case of an emergency  Manage your symptoms:   · Apply heat or ice  on the headache area  Use a heat or ice pack  For an ice pack, you can also put crushed ice in a plastic bag  Cover the pack or bag with a towel before you apply it to your skin  Ice and heat both help decrease pain, and heat also helps decrease muscle spasms  Apply heat for 20 to 30 minutes every 2 hours  Apply ice for 15 to 20 minutes every hour  Apply heat or ice for as long and for as many days as directed  You may alternate heat and ice  · Relax your muscles  Lie down in a comfortable position and close your eyes  Relax your muscles slowly  Start at your toes and work your way up your body  · Keep a record of your headaches  Write down when your headaches start and stop  Include your symptoms and what you were doing when the headache began  Record what you ate or drank for 24 hours before the headache started  Describe the pain and where it hurts  Keep track of what you did to treat your headache and if it worked  Prevent an acute headache:   · Avoid anything that triggers an acute headache  Examples include exposure to chemicals, going to high altitude, or not getting enough sleep  Create a regular sleep routine   Go to sleep at the same time and wake up at the same time each day  Do not use electronic devices before bedtime  These may trigger a headache or prevent you from sleeping well  · Do not smoke  Nicotine and other chemicals in cigarettes and cigars can trigger an acute headache or make it worse  Ask your healthcare provider for information if you currently smoke and need help to quit  E-cigarettes or smokeless tobacco still contain nicotine  Talk to your healthcare provider before you use these products  · Limit alcohol as directed  Alcohol can trigger an acute headache or make it worse  If you have cluster headaches, do not drink alcohol during an episode  For other types of headaches, ask your healthcare provider if it is safe for you to drink alcohol  Ask how much is safe for you to drink, and how often  · Exercise as directed  Exercise can reduce tension and help with headache pain  Aim for 30 minutes of physical activity on most days of the week  Your healthcare provider can help you create an exercise plan  · Eat a variety of healthy foods  Healthy foods include fruits, vegetables, low-fat dairy products, lean meats, fish, whole grains, and cooked beans  Your healthcare provider or dietitian can help you create meals plans if you need to avoid foods that trigger headaches  Follow up with your healthcare provider as directed:  Bring your headache record with you when you see your healthcare provider  Write down your questions so you remember to ask them during your visits  © 2017 [facility] Information is for End User's use only and may not be sold, redistributed or otherwise used for commercial purposes  All illustrations and images included in CareNotes® are the copyrighted property of A D A M , Inc  or Scar Barraza  The above information is an  only  It is not intended as medical advice for individual conditions or treatments   Talk to your doctor, nurse or pharmacist before following any medical regimen to see if it is safe and effective for you

## 2018-07-15 ENCOUNTER — HOSPITAL ENCOUNTER (OUTPATIENT)
Facility: HOSPITAL | Age: 38
Setting detail: OBSERVATION
Discharge: HOME | End: 2018-07-17
Attending: EMERGENCY MEDICINE | Admitting: INTERNAL MEDICINE
Payer: COMMERCIAL

## 2018-07-15 DIAGNOSIS — Z86.73 HISTORY OF CVA (CEREBROVASCULAR ACCIDENT): ICD-10-CM

## 2018-07-15 DIAGNOSIS — Z72.0 TOBACCO USE: ICD-10-CM

## 2018-07-15 DIAGNOSIS — Z59.00 HOMELESSNESS: ICD-10-CM

## 2018-07-15 DIAGNOSIS — R42 DIZZINESS: ICD-10-CM

## 2018-07-15 DIAGNOSIS — I67.5 MOYAMOYA DISEASE: ICD-10-CM

## 2018-07-15 DIAGNOSIS — Z87.59 MISCARRIAGE WITHIN LAST 12 MONTHS: ICD-10-CM

## 2018-07-15 DIAGNOSIS — R55 SYNCOPE: Primary | ICD-10-CM

## 2018-07-15 PROCEDURE — 93005 ELECTROCARDIOGRAM TRACING: CPT

## 2018-07-15 SDOH — ECONOMIC STABILITY - HOUSING INSECURITY: HOMELESSNESS UNSPECIFIED: Z59.00

## 2018-07-16 ENCOUNTER — APPOINTMENT (OUTPATIENT)
Dept: MRI IMAGING | Facility: HOSPITAL | Age: 38
End: 2018-07-16
Payer: COMMERCIAL

## 2018-07-16 ENCOUNTER — APPOINTMENT (EMERGENCY)
Dept: CT IMAGING | Facility: HOSPITAL | Age: 38
End: 2018-07-16
Payer: COMMERCIAL

## 2018-07-16 ENCOUNTER — APPOINTMENT (OUTPATIENT)
Dept: NEUROLOGY | Facility: HOSPITAL | Age: 38
End: 2018-07-16
Attending: PSYCHIATRY & NEUROLOGY
Payer: COMMERCIAL

## 2018-07-16 ENCOUNTER — APPOINTMENT (OUTPATIENT)
Dept: NON INVASIVE DIAGNOSTICS | Facility: HOSPITAL | Age: 38
End: 2018-07-16
Payer: COMMERCIAL

## 2018-07-16 PROBLEM — R55 SYNCOPE: Status: ACTIVE | Noted: 2018-07-16

## 2018-07-16 PROBLEM — R42 DIZZINESS: Status: ACTIVE | Noted: 2018-07-16

## 2018-07-16 PROBLEM — Z87.59 MISCARRIAGE WITHIN LAST 12 MONTHS: Status: ACTIVE | Noted: 2018-07-16

## 2018-07-16 LAB
ANION GAP SERPL CALCULATED.3IONS-SCNC: 8 MMOL/L (ref 4–13)
ATRIAL RATE: 78 BPM
BASOPHILS # BLD AUTO: 0.03 THOUSANDS/ΜL (ref 0–0.1)
BASOPHILS NFR BLD AUTO: 0 % (ref 0–1)
BILIRUB UR QL STRIP: NEGATIVE
BUN SERPL-MCNC: 8 MG/DL (ref 5–25)
CALCIUM SERPL-MCNC: 9.2 MG/DL (ref 8.3–10.1)
CHLORIDE SERPL-SCNC: 101 MMOL/L (ref 100–108)
CLARITY UR: CLEAR
CO2 SERPL-SCNC: 29 MMOL/L (ref 21–32)
COLOR UR: YELLOW
CREAT SERPL-MCNC: 0.71 MG/DL (ref 0.6–1.3)
EOSINOPHIL # BLD AUTO: 0.07 THOUSAND/ΜL (ref 0–0.61)
EOSINOPHIL NFR BLD AUTO: 1 % (ref 0–6)
ERYTHROCYTE [DISTWIDTH] IN BLOOD BY AUTOMATED COUNT: 14.5 % (ref 11.6–15.1)
GFR SERPL CREATININE-BSD FRML MDRD: 107 ML/MIN/1.73SQ M
GLUCOSE SERPL-MCNC: 108 MG/DL (ref 65–140)
GLUCOSE UR STRIP-MCNC: NEGATIVE MG/DL
HCT VFR BLD AUTO: 33.6 % (ref 34.8–46.1)
HGB BLD-MCNC: 10.5 G/DL (ref 11.5–15.4)
HGB UR QL STRIP.AUTO: NEGATIVE
IMM GRANULOCYTES # BLD AUTO: 0.04 THOUSAND/UL (ref 0–0.2)
IMM GRANULOCYTES NFR BLD AUTO: 0 % (ref 0–2)
KETONES UR STRIP-MCNC: NEGATIVE MG/DL
LEUKOCYTE ESTERASE UR QL STRIP: NEGATIVE
LYMPHOCYTES # BLD AUTO: 3.01 THOUSANDS/ΜL (ref 0.6–4.47)
LYMPHOCYTES NFR BLD AUTO: 26 % (ref 14–44)
MCH RBC QN AUTO: 25.8 PG (ref 26.8–34.3)
MCHC RBC AUTO-ENTMCNC: 31.3 G/DL (ref 31.4–37.4)
MCV RBC AUTO: 83 FL (ref 82–98)
MONOCYTES # BLD AUTO: 0.54 THOUSAND/ΜL (ref 0.17–1.22)
MONOCYTES NFR BLD AUTO: 5 % (ref 4–12)
NEUTROPHILS # BLD AUTO: 8.04 THOUSANDS/ΜL (ref 1.85–7.62)
NEUTS SEG NFR BLD AUTO: 68 % (ref 43–75)
NITRITE UR QL STRIP: NEGATIVE
NRBC BLD AUTO-RTO: 0 /100 WBCS
P AXIS: 50 DEGREES
PH UR STRIP.AUTO: 6 [PH] (ref 4.5–8)
PLATELET # BLD AUTO: 327 THOUSANDS/UL (ref 149–390)
PMV BLD AUTO: 10.3 FL (ref 8.9–12.7)
POTASSIUM SERPL-SCNC: 3.9 MMOL/L (ref 3.5–5.3)
PR INTERVAL: 138 MS
PROT UR STRIP-MCNC: NEGATIVE MG/DL
QRS AXIS: 60 DEGREES
QRSD INTERVAL: 72 MS
QT INTERVAL: 398 MS
QTC INTERVAL: 453 MS
RBC # BLD AUTO: 4.07 MILLION/UL (ref 3.81–5.12)
SODIUM SERPL-SCNC: 138 MMOL/L (ref 136–145)
SP GR UR STRIP.AUTO: 1.02 (ref 1–1.03)
T WAVE AXIS: 0 DEGREES
TROPONIN I SERPL-MCNC: <0.02 NG/ML
UROBILINOGEN UR QL STRIP.AUTO: 0.2 E.U./DL
VENTRICULAR RATE: 78 BPM
WBC # BLD AUTO: 11.73 THOUSAND/UL (ref 4.31–10.16)

## 2018-07-16 PROCEDURE — 36415 COLL VENOUS BLD VENIPUNCTURE: CPT | Performed by: EMERGENCY MEDICINE

## 2018-07-16 PROCEDURE — 85025 COMPLETE CBC W/AUTO DIFF WBC: CPT | Performed by: EMERGENCY MEDICINE

## 2018-07-16 PROCEDURE — 96360 HYDRATION IV INFUSION INIT: CPT

## 2018-07-16 PROCEDURE — 93010 ELECTROCARDIOGRAM REPORT: CPT | Performed by: INTERNAL MEDICINE

## 2018-07-16 PROCEDURE — 70551 MRI BRAIN STEM W/O DYE: CPT

## 2018-07-16 PROCEDURE — 93306 TTE W/DOPPLER COMPLETE: CPT | Performed by: INTERNAL MEDICINE

## 2018-07-16 PROCEDURE — 93306 TTE W/DOPPLER COMPLETE: CPT

## 2018-07-16 PROCEDURE — 97167 OT EVAL HIGH COMPLEX 60 MIN: CPT

## 2018-07-16 PROCEDURE — 80048 BASIC METABOLIC PNL TOTAL CA: CPT | Performed by: EMERGENCY MEDICINE

## 2018-07-16 PROCEDURE — 96361 HYDRATE IV INFUSION ADD-ON: CPT

## 2018-07-16 PROCEDURE — 70544 MR ANGIOGRAPHY HEAD W/O DYE: CPT

## 2018-07-16 PROCEDURE — 95816 EEG AWAKE AND DROWSY: CPT | Performed by: PSYCHIATRY & NEUROLOGY

## 2018-07-16 PROCEDURE — 99254 IP/OBS CNSLTJ NEW/EST MOD 60: CPT | Performed by: PSYCHIATRY & NEUROLOGY

## 2018-07-16 PROCEDURE — A9585 GADOBUTROL INJECTION: HCPCS | Performed by: RADIOLOGY

## 2018-07-16 PROCEDURE — 81003 URINALYSIS AUTO W/O SCOPE: CPT | Performed by: NURSE PRACTITIONER

## 2018-07-16 PROCEDURE — G8988 SELF CARE GOAL STATUS: HCPCS

## 2018-07-16 PROCEDURE — 99285 EMERGENCY DEPT VISIT HI MDM: CPT

## 2018-07-16 PROCEDURE — 70450 CT HEAD/BRAIN W/O DYE: CPT

## 2018-07-16 PROCEDURE — 84484 ASSAY OF TROPONIN QUANT: CPT | Performed by: EMERGENCY MEDICINE

## 2018-07-16 PROCEDURE — 95816 EEG AWAKE AND DROWSY: CPT

## 2018-07-16 PROCEDURE — 99220 PR INITIAL OBSERVATION CARE/DAY 70 MINUTES: CPT | Performed by: NURSE PRACTITIONER

## 2018-07-16 PROCEDURE — 70549 MR ANGIOGRAPH NECK W/O&W/DYE: CPT

## 2018-07-16 PROCEDURE — 99244 OFF/OP CNSLTJ NEW/EST MOD 40: CPT | Performed by: INTERNAL MEDICINE

## 2018-07-16 PROCEDURE — G8987 SELF CARE CURRENT STATUS: HCPCS

## 2018-07-16 RX ORDER — DOXEPIN HYDROCHLORIDE 25 MG/1
25 CAPSULE ORAL
Status: DISCONTINUED | OUTPATIENT
Start: 2018-07-16 | End: 2018-07-17 | Stop reason: HOSPADM

## 2018-07-16 RX ORDER — NICOTINE 21 MG/24HR
1 PATCH, TRANSDERMAL 24 HOURS TRANSDERMAL DAILY
Status: DISCONTINUED | OUTPATIENT
Start: 2018-07-16 | End: 2018-07-17 | Stop reason: HOSPADM

## 2018-07-16 RX ORDER — ACETAMINOPHEN 325 MG/1
650 TABLET ORAL EVERY 4 HOURS PRN
Status: DISCONTINUED | OUTPATIENT
Start: 2018-07-16 | End: 2018-07-17 | Stop reason: HOSPADM

## 2018-07-16 RX ORDER — PAROXETINE HYDROCHLORIDE 20 MG/1
20 TABLET, FILM COATED ORAL DAILY
Status: DISCONTINUED | OUTPATIENT
Start: 2018-07-16 | End: 2018-07-17 | Stop reason: HOSPADM

## 2018-07-16 RX ORDER — ATORVASTATIN CALCIUM 40 MG/1
40 TABLET, FILM COATED ORAL EVERY EVENING
Status: DISCONTINUED | OUTPATIENT
Start: 2018-07-16 | End: 2018-07-17 | Stop reason: HOSPADM

## 2018-07-16 RX ORDER — GABAPENTIN 100 MG/1
100 CAPSULE ORAL 3 TIMES DAILY
Status: DISCONTINUED | OUTPATIENT
Start: 2018-07-16 | End: 2018-07-17 | Stop reason: HOSPADM

## 2018-07-16 RX ORDER — CLONAZEPAM 0.5 MG/1
0.5 TABLET ORAL 2 TIMES DAILY PRN
COMMUNITY
End: 2018-10-12 | Stop reason: HOSPADM

## 2018-07-16 RX ORDER — POTASSIUM CHLORIDE 750 MG/1
10 TABLET, EXTENDED RELEASE ORAL 2 TIMES DAILY
Status: DISCONTINUED | OUTPATIENT
Start: 2018-07-16 | End: 2018-07-17 | Stop reason: HOSPADM

## 2018-07-16 RX ORDER — ASPIRIN 81 MG/1
324 TABLET, CHEWABLE ORAL DAILY
Status: DISCONTINUED | OUTPATIENT
Start: 2018-07-16 | End: 2018-07-16

## 2018-07-16 RX ORDER — GABAPENTIN 300 MG/1
300 CAPSULE ORAL 3 TIMES DAILY
Status: DISCONTINUED | OUTPATIENT
Start: 2018-07-16 | End: 2018-07-16

## 2018-07-16 RX ORDER — ASPIRIN 81 MG/1
81 TABLET, CHEWABLE ORAL DAILY
Status: DISCONTINUED | OUTPATIENT
Start: 2018-07-16 | End: 2018-07-17 | Stop reason: HOSPADM

## 2018-07-16 RX ADMIN — DOXEPIN HYDROCHLORIDE 25 MG: 25 CAPSULE ORAL at 22:22

## 2018-07-16 RX ADMIN — LEVOTHYROXINE SODIUM 137 MCG: 112 TABLET ORAL at 11:16

## 2018-07-16 RX ADMIN — GADOBUTROL 9 ML: 604.72 INJECTION INTRAVENOUS at 22:35

## 2018-07-16 RX ADMIN — POTASSIUM CHLORIDE 10 MEQ: 750 TABLET, EXTENDED RELEASE ORAL at 11:17

## 2018-07-16 RX ADMIN — PAROXETINE HYDROCHLORIDE 20 MG: 20 TABLET, FILM COATED ORAL at 11:16

## 2018-07-16 RX ADMIN — POTASSIUM CHLORIDE 10 MEQ: 750 TABLET, EXTENDED RELEASE ORAL at 18:06

## 2018-07-16 RX ADMIN — SODIUM CHLORIDE 1000 ML: 0.9 INJECTION, SOLUTION INTRAVENOUS at 03:45

## 2018-07-16 RX ADMIN — ACETAMINOPHEN 650 MG: 325 TABLET, FILM COATED ORAL at 11:15

## 2018-07-16 RX ADMIN — ASPIRIN 81 MG CHEWABLE TABLET 81 MG: 81 TABLET CHEWABLE at 11:16

## 2018-07-16 RX ADMIN — NICOTINE 1 PATCH: 14 PATCH, EXTENDED RELEASE TRANSDERMAL at 11:17

## 2018-07-16 RX ADMIN — ATORVASTATIN CALCIUM 40 MG: 40 TABLET, FILM COATED ORAL at 18:06

## 2018-07-16 NOTE — PLAN OF CARE
Problem: DISCHARGE PLANNING - CARE MANAGEMENT  Goal: Discharge to post-acute care or home with appropriate resources  INTERVENTIONS:  - Conduct assessment to determine patient/family and health care team treatment goals, and need for post-acute services based on payer coverage, community resources, and patient preferences, and barriers to discharge  - Address psychosocial, clinical, and financial barriers to discharge as identified in assessment in conjunction with the patient/family and health care team  - Arrange appropriate level of post-acute services according to patients   needs and preference and payer coverage in collaboration with the physician and health care team  - Communicate with and update the patient/family, physician, and health care team regarding progress on the discharge plan  - Arrange appropriate transportation to post-acute venues  Outcome: Progressing  CM met with pt at bedside  Pt lives alone in a tent-she is homeless  She has no family support  She has a Jeremyrandal Crockerinefelter who lives with his brother, but is not in the position to offer housing  She has a [address]  She has an interview set up with [employer] to assist with housing  She is also affiliated with [facility] where she can shower, do laundry and get her mail  She has had issues with alcohol in the past, but not anymore  She has anxiety and depression and has been in facilities for behavior health  She thinks the name of the one she was in 2003 was called [facility]  She feels the behavior issues were brought on by no sleep in 11 days  She was also in a [facility] in [place] in 2016, but she signed herself out AMA  She had brain surgery in 2017  She has a hx of moyamoya  She does not have an Advanced Directive or POA and does not want info at this time  She uses [address] [place] [address] in [facility] and has no problem with her co-pays  Her PCP is Dr Perla Garcia  She does not work or drive    She gets to appointments by bus  She has an  for disability but her pending case was pushed back until Nov 28th  CM discussed homeless shelters, but pt is already aware of what is available and does not want info  CM will follow through hospitalization  OBS status reviewed and signed  Copy to pt and copy to MR for scanning

## 2018-07-16 NOTE — SPEECH THERAPY NOTE
Consult received, chart reviewed  Pt initially presented with s/sx concerning for CVA, however, sx have now resolved  D/w TARSHA Egan who reports that pt passed dysphagia screening this AM, has tolerated 2 Regular/Thin Liquid meals without overt difficulty or s/sx aspiration, and p/w no apparent deficits in speech, language, or swallowing  Pt likewise denies complaints related to speech, language, or swallowing  Full ST eval is not warranted at this time  Please reconsult with any concerns

## 2018-07-16 NOTE — ASSESSMENT & PLAN NOTE
Patient report 2 episodes of syncope within the past few days  History of moyamoya disease  CT of the brain reviewed, hold PCA territory infarct involving posterior temporal/parietal and occipital parenchymal parietal craniotomy,  Cerebral atrophy with chronic small vessel ischemic white matter disease and chronic right PCA territory infarction    No acute intracranial abnormality      Initial  NIH 0  Neuro checks  Fall safety precaution  Neurology consult  Cardiology consult as well

## 2018-07-16 NOTE — SOCIAL WORK
CM met with pt at bedside  Pt lives alone in a tent-she is homeless  She has no family support  She has a Josee Hui who lives with his brother, but is not in the position to offer housing  She has a [address]  She has an interview set up with [employer] to assist with housing  She is also affiliated with [employer] [facility] where she can shower, do laundry and get her mail  She has had issues with alcohol in the past, but not anymore  She has anxiety and depression and has been in facilities for behavior health  She thinks the name of the one she was in 2003 was called [facility]  She feels the behavior issues were brought on by no sleep in 11 days  She was also in a [facility] in [place] in 2016, but she signed herself out AMA  She had brain surgery in 2017  She has a hx of moyamoya  She does not have an Advanced Directive or POA and does not want info at this time  She uses [employer] in [facility] and has no problem with her co-pays  Her PCP is Dr Alex Vaughan  She does not work or drive  She gets to appointments by bus  She has an  for disability but her pending case was pushed back until Nov 28th  CM discussed homeless shelters, but pt is already aware of what is available and does not want info  CM will follow through hospitalization  OBS status reviewed and signed  Copy to pt and copy to MR for scanning

## 2018-07-16 NOTE — CONSULTS
REQUIRED DOCUMENTATION:     1  This service was provided via Telemedicine  2  Provider located at [facility]  3  TeleMed provider: [facility],    4  Identify all parties in room with patient during tele consult:  Patient and RN  5  After connecting through [employer], patient was identified by name and date of birth and assistant checked wristband  Patient was then informed that this was a Telemedicine visit and that the exam was being conducted confidentially over secure lines  My office door was closed  No one else was in the room  Patient acknowledged consent and understanding of privacy and security of the Telemedicine visit, and gave us permission to have the assistant stay in the room in order to assist with the history and to conduct the exam   I informed the patient that I have reviewed their record in Epic and presented the opportunity for them to ask any questions regarding the visit today  The patient agreed to participate  Neurology Consultation    Assessment/Plan:  1) Syncope given orthostatic light headedness reported- suspect orthostatic syncope in the setting of likely mild dehydration in a patient with intracranial stenosis from Moyamoya disease   - Since her last CTA in our system 10/2017 she has had revascularization surgery per her in [facility]- will obtain repeat imaging to reassess this  - Check orthostatics  - IVF  - EEG to r/o seizure given cortical damage from prior stroke- less likely given description of syncope however she also reports transient confusion with right sided weakness (not present during my exam)  - MRI brain to rule out acute stroke  - Gabapentin can worsen lightheadedness- would reduce to 100 TID or 300 qhs based on patient's tolerability- at least for the short term    ASA 81 for now for secondary stroke prevention   She is supposed to be on Aggrenox per OP chart review  Follow up with Marianela Harvey OP whom she has already seen        HPI  Param Young is a 34 y o , hx Moyamoya disease- diagnosed 2012 s/p ischemic stroke LPCA territory- this is noted on CT head with dense left hemianopsia and mild expressive aphasia and word finding with speech, s/p revascularization surgery in [place] 2017 per her seen in consultation for 3 syncopal events starting this past weekend  She tells me she has no warning  States she is usually standing up or doing something and then "faints"  Denies dizziness, vertigo, heart palpitations, chest pain  States "out of it" for a few seconds and when she comes to is not confused  States tired  Denies one sided weakness, diplopia, dysarthria or sensory loss with these events  States recently for the past few weeks has had periodic right sided weakness with confusion  Tells me this is odd as normally her left side is weaker with her history of stroke  States has always had low blood pressure  States has been outside and maybe dehydrated  She has also very recently undergone a miscarriage earlier this months and states not feeling good overall with this  Denies any other illnesses, fevers, chills, UTI  States recently did see OBGYN for the above reason but not since  Denies history seizures  Denies CAD, Afib, blood clots, HTN, DM- last HgbA1C 6 2        Physical Exam    General: AO x person place time and situation  NAD   HEENT: NCAT, PERRL, No septal deviation  CVS: RRR  Respiratory: Symmetric chest rise  No respiratory distress noted  No wheezing rales or rhonchi  Extremities: No edema noted  MSK: Normal ROM  Neurologic exam: Tele consultation testing including resistance strength and VF and sensory exam done with help of RN at bedside  Mental Status: Alert and oriented to person, place, time and situation  Mild expressive aphasia and no dysarthria noted  Naming and repetition intact  Comprehension intact   Judgement and insight intact  Cranial Nerves:PERRL  EOM intact  Sensation intact V1-3 b/l except reduced right V1-3    L facial asymmetry noted  Hearing intact to conversation  No uvula or tongue deviation noted  SCM and Trapezius strength bilaterally intact  Confrontation testing with dense left hemianopsia  Motor Exam: B/l UE and LE intact strength  No pronator drift noted  Sensory Exam: Intact sensation to PP/temp, vibration, and LT in all extremities proximal and distal   Coordination: No dysmetria to FNF or HS testing       Gait: deferred      Imaging Studies: I have personally reviewed pertinent films in PACS

## 2018-07-16 NOTE — CASE MANAGEMENT
Thank you,  Roro Aqq  [facility] Utilization Review Department  Phone: [phone]; Fax [phone]  ATTENTION: The Network Utilization Review Department is now centralized for our 9 Facilities  Make a note that we have a new phone and fax numbers for our Department  Please call with any questions or concerns to [phone] and carefully follow the prompts so that you are directed to the right person  All voicemails are confidential  Fax any determinations, approvals, denials, and requests for initial or continue stay review clinical to [phone]  Due to HIGH CALL volume, it would be easier if you could please send faxed requests to expedite your requests and in part, help us provide discharge notifications faster  Initial Clinical Review    Admission: Date/Time/Statement: OBSERVATION 7/16/18 @0737     Orders Placed This Encounter   Procedures    Place in Observation (expected length of stay for this patient is less than two midnights)     Standing Status:   Standing     Number of Occurrences:   1     Order Specific Question:   Admitting Physician     Answer:   Akin Adrian [[id number]]     Order Specific Question:   Level of Care     Answer:   Med Surg [[id number]]     ED Arrival Information     Expected Arrival Acuity Means of Arrival Escorted By Service Admission Type    - 7/15/2018 23:39 Urgent Ambulance [facility] General Medicine Urgent    Arrival Complaint    -          Chief Complaint   Patient presents with    Dizziness     per EMS pt hx of Kevin is homeless was [place] up [place] enc[place]off [place] beaten [place]"; pt reporting dizziness that started just prior to arrival; per EMS pt was drinking beer prior to transport       History of Illness: 35 yo homeless fem with calderon calderon dz c/o dizzness, syncope x 2 in past few days  Recent miscarriage, c/o serous vaginal discharge       ED Vital Signs:   ED Triage Vitals   Temperature Pulse Respirations Blood Pressure SpO2   07/15/18 2347 07/15/18 2343 07/15/18 2343 07/15/18 2343 07/15/18 2343   97 8 °F (36 6 °C) 86 20 139/88 99 %      Temp Source Heart Rate Source Patient Position - Orthostatic VS BP Location FiO2 (%)   07/15/18 2347 07/16/18 0225 07/15/18 2343 07/15/18 2343 --   Oral Monitor Lying Right arm       Pain Score       07/16/18 0638       No Pain        Wt Readings from Last 1 Encounters:   07/07/18 81 6 kg (180 lb)     Abnormal Labs/Diagnostic Test Results:   7/16: wbc 11 73   hgb 10 5   hct 33 6     UA negative  EKG: "no dysrhythmia" per ED note (not read officially yet)  MRI brain pending  Echo pending  EEG pending      CT brain: 1   Cerebral atrophy with chronic small vessel ischemic white matter disease and chronic right PCA territory infarction  2   No acute intracranial abnormality    ED Treatment:   Medication Administration from 07/15/2018 2339 to 07/16/2018 0820       Date/Time Order Dose Route Action Action by Comments     07/16/2018 0345 sodium chloride 0 9 % bolus 1,000 mL 1,000 mL Intravenous New Bag Bruno Meadows RN           Past Medical/Surgical History: Active Ambulatory Problems     Diagnosis Date Noted    History of CVA (cerebrovascular accident) 10/07/2017    Tobacco use 10/07/2017    Moyamoya disease 10/07/2017    Leukocytosis 10/07/2017    Hypothyroidism 10/09/2017    Psychosocial problem 10/09/2017    Homeless 06/22/2018     Resolved Ambulatory Problems     Diagnosis Date Noted    Left sided numbness 10/07/2017     Past Medical History:   Diagnosis Date    History of CVA (cerebrovascular accident)     Moyamoya disease     Tobacco use        Admitting Diagnosis: Dizziness [R42]  Syncope [R55]  Homelessness [Z59 0]  History of CVA (cerebrovascular accident) [Z86 73]  Tobacco use [Z72 0]  Miscarriage within last 12 months [Z87 59]    Age/Sex: 34 y o  female    Assessment/Plan:   SYNCOPE & DIZZINESS: Neuro checks  Fall safety precaution  Neurology consult    Cardiology consult   VAGINAL BLEEDING: miscarriage end of June, cytotec given then  Hasn't seen GYN since then  Consult GYN  Admission Orders:  Scheduled Meds:   Current Facility-Administered Medications:  acetaminophen 650 mg Oral Q4H PRN   aspirin 324 mg Oral Daily   aspirin 81 mg Oral Daily   atorvastatin 40 mg Oral QPM   doxepin 25 mg Oral HS   gabapentin 300 mg Oral TID   levothyroxine 137 mcg Oral Daily   nicotine 1 patch Transdermal Daily   PARoxetine 20 mg Oral Daily   potassium chloride 10 mEq Oral BID     Tele  Vitals & neuro checks Every 1 hour x 4 hours, then every 2 hours x 4, then every 4 hours x 72 hours  NIH stroke score  Incentive spirom   oob as laura  SCD's  Dysphagia eval prior to PO intake; hse diet  Cons neuro  Cons cardio  Cons gyn  Cons case mgmt  Cons nutrition  Cons PT/OT/speech  MRI brain    PER CARDIO 7/16:  Cites 3 syncopal episodes in last 24 hrs  Injured bridge of nose and knee  Was moving from seated to standing position prior to one episode  In past has felt lightheaded when bp was low       Assessment and Plan:  Syncope  -at least one episode was related to positional changes   -she does admit to having difficulty staying hydrated due to being homeless-- will obtain orthostatics  -previous echo in 2017 was unremarkable, however will repeat to evaluate for any acute change in EF   -no events on telemetry, will monitor   -await neuro consult--    PER NEURO 7/16:  She tells me she has no warning  States she is usually standing up or doing something and then "faints"    "out of it" for a few seconds and when she comes to is tired  periodic right sided weakness with confusion over past few weeks   Tells me this is odd as normally her left side is weaker with her history of stroke  dense left hemianopsia  1) Syncope given orthostatic light headedness reported- suspect orthostatic syncope in the setting of likely mild dehydration in a patient with intracranial stenosis from Moyamoya disease   - Since her last CTA in our system 10/2017 she has had revascularization surgery per her in [facility]- will obtain repeat imaging to reassess this  - Check orthostatics  - IVF  - EEG to r/o seizure given cortical damage from prior stroke- less likely given description of syncope however she also reports transient confusion with right sided weakness (not present during my exam)  - MRI brain to rule out acute stroke  - Gabapentin can worsen lightheadedness- would reduce to 100 TID or 300 qhs based on patient's tolerability- at least for the short term   ASA 81 for now for secondary stroke prevention  She is supposed to be on Aggrenox per OP chart review

## 2018-07-16 NOTE — OCCUPATIONAL THERAPY NOTE
Occupational Therapy Evaluation         Patient Name: Tucker Mathew  [id number] Date: 7/16/2018 07/16/18 1102   Note Type   Note type Eval/Treat   Restrictions/Precautions   Weight Bearing Precautions Per Order No   Braces or Orthoses Other (Comment)  (none at baseline )   Other Precautions Chair Alarm; Bed Alarm   Pain Assessment   Pain Assessment 0-10   Pain Score 7   Pain Type Chronic pain   Pain Location Head   Home Living   Type of Home Homeless  (lives in a tint )   Additional Comments Pt reports being Indep at baseline    Prior Function   Level of Mower Independent with ADLs and functional mobility   Lives With Alone   Receives Help From Neighbor  (if needed )   ADL Assistance Independent   Falls in the last 6 months 1 to 4  (3 falls within last 24 hours )   Vocational Unemployed   Comments 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  ([email or web address] and [employer] for meals )   Lifestyle   Reciprocal Relationships supportive boyfriend and neighbors    Psychosocial   Psychosocial (WDL) WDL   ADL   Where Assessed Edge of bed   Eating Assistance 7  Independent   Grooming Assistance 5  Supervision/Setup   Grooming Deficit Supervision/safety; Increased time to complete   UB Bathing Assistance 5  Supervision/Setup   UB Bathing Deficit Supervision/safety; Increased time to complete   LB Bathing Assistance 4  Minimal Assistance   LB Bathing Deficit Increased time to complete;Supervision/safety   UB Dressing Assistance 7  Independent   UB Dressing Deficit Supervision/safety; Increased time to complete   LB Dressing Assistance 5  Supervision/Setup   LB Dressing Deficit Increased time to complete;Supervision/safety   Toileting Assistance  5  Supervision/Setup   Toileting Deficit Increased time to complete;Supervison/safety;Steadying   Functional Assistance 5  Supervision/Setup   Functional Deficit Supervision/safety; Increased time to complete;Steadying   Bed Mobility   Rolling R 5  Supervision Additional items Assist x 1;Bedrails; Increased time required;HOB elevated   Rolling L 5  Supervision   Additional items Assist x 1;HOB elevated; Bedrails; Increased time required   Supine to Sit 5  Supervision   Additional items Assist x 1;HOB elevated; Bedrails; Increased time required;Verbal cues   Sit to Supine 5  Supervision   Additional items Assist x 1; Increased time required;Verbal cues   Transfers   Sit to Stand 5  Supervision   Additional items Assist x 1;HOB elevated; Bedrails; Increased time required   Toilet transfer 5  Supervision   Additional items Assist x 1; Increased time required;Armrests   Functional Mobility   Functional Mobility 5  Supervision   Additional Comments 2* feeling dizzy and unsteady gait    Additional items Rolling walker   Balance   Static Sitting Good   Dynamic Sitting Good   Static Standing Fair   Dynamic Standing Fair   Activity Tolerance   Activity Tolerance Patient limited by fatigue   Nurse Made Aware Iraida WILLINGHAM verbalized pt appropriate for tx session    RUE Assessment   RUE Assessment WFL   LUE Assessment   LUE Assessment WFL   Hand Function   Gross Motor Coordination Functional   Sensation   Light Touch No apparent deficits   Vision-Basic Assessment   Visual History (pt reports chronic visual field loss)   Vision - Complex Assessment   Visual Fields (L visual field loss not related to this episode )   Perception   Inattention/Neglect Appears intact   Cognition   Overall Cognitive Status WFL   Arousal/Participation Alert; Cooperative   Attention Within functional limits   Orientation Level Oriented X4   Memory Within functional limits   Following Commands Follows all commands and directions without difficulty   Assessment   Limitation Decreased ADL status; Decreased UE strength;Decreased Safe judgement during ADL;Decreased endurance;Decreased self-care trans;Decreased high-level ADLs   Prognosis Fair;Good   Assessment Pt is a 34 y o  female seen for OT evaluation s/p admit to [facility]  [facility] on 7/15/2018 w/ Dizziness  Patient presented after syncopal episode  Patient states that in the last 24 hr she has had 3 syncopal events  She reports minimal prodrome  During 1 episode she injured the bridge of her nose and her knee  She relates at during at least 1 episode she was going from seated to standing position  Comorbidities affecting pt's functional performance at time of assessment include: Randolm Marc disease, CVA, tobacco abuse  Personal factors affecting pt at time of IE include:difficulty performing ADLS, difficulty performing IADLS , financial barriers and health management and being homeless  Prior to admission, pt was homeless, living in a tint with support from neighbors as needed  I/ BADLs, bathing at a local day program and received meals form [employer] and [employer]  Pt I/functional mobility, and doesn't drive  Upon evaluation: Pt c/o headache pain 6/10 RN informed meds given  She presents with overall weakness and decreased activity tolerance that affects her ability for safely perform ADLs alone and independently  Pt requiring sup to min A for UB/LB ADLs  Pt scoring a 55/100 on the Barthel Index indicating the the following deficits impacting occupational performance: weakness, decreased strength, decreased balance and decreased tolerance  Pt to benefit from continued skilled OT tx while in the hospital to address deficits as defined above and maximize level of functional independence w ADL's and functional mobility  Occupational Performance areas to address include: grooming, bathing/shower, toilet hygiene, dressing, medication management, socialization, health maintenance, functional mobility, community mobility and clothing management  From OT standpoint, recommendation at time of d/c would be STR vs return to PLOF pending progress      Goals   Patient Goals to get stronger and feel better    Plan   Treatment Interventions ADL retraining;Functional transfer training;UE strengthening/ROM; Endurance training;Energy conservation   Goal Expiration Date 07/26/18   OT Frequency 3-5x/wk   Recommendation   OT Discharge Recommendation Short Term Rehab   OT - OK to Discharge Yes   Barthel Index   Feeding 10   Bathing 0   Grooming Score 5   Dressing Score 5   Bladder Score 10   Bowels Score 10   Toilet Use Score 5   Transfers (Bed/Chair) Score 10   Mobility (Level Surface) Score 0   Stairs Score 0   Barthel Index Score 55   Modified McRae Helena Scale   Modified Lalito Scale 4       Occupational Therapy goals: In 7-14 days:     1- Patient will verbalize and demonstrate use of energy conservation/ deep breathing technique and work simplification skills during functional activity with no verbal cues  2-Patient will verbalize and demonstrate good body mechanics and joint protection techniques during  ADLs/ IADLs with no verbal cues  3- Patient will increase OOB/ sitting tolerance to 2-4 hours per day for increased participation in self care and leisure tasks with no s/s of exertion  4-Patient will increase standing tolerance time to 5  minutes with unilateral UE support to complete sink level ADLs@ mod I level   5- Patient will increase sitting tolerance at edge of bed to 20 minutes to complete UB ADLs @ set up assist level  6- Patient will transfer bed to Chair / toilet at Set up assist level with AD as indicated    7- Patient will complete UB ADLs with [place]  8- Patient will complete LB ADLs with [place]   9- Patient will complete toileting hygiene with [place]  10-Patient/ Family  will demonstrate competency with UE Home Exercise Program

## 2018-07-16 NOTE — ED PROVIDER NOTES
History  Chief Complaint   Patient presents with    Dizziness     per EMS pt hx of Moyamoya is homeless was picked up from [place] "off the beaten path"; pt reporting dizziness that started just prior to arrival; per EMS pt was drinking beer prior to transport     34 y o  female presents with two episode of possible syncope that occurred over the past day, one yesterday and one today around 9pm  The patient does not recall the events and states there were witnessed but did not ask them for details and they are not present  Patient describes no events immediately prior to the episode  The patient denies triggers  The patient denies exertion-related event  Patient denies any aura to the event  The patient denies any preceding chest pain, palpitations, dyspnea, headache, abdominal pain, or back pain  Patient denies any similar episodes  The patient states that nothing exacerbates these symptoms and nothing improves the symptoms  ROS: the patient denies any hematochezia or melena, nausea/vomiting, diarrhea, visual changes, vaginal bleeding or discharge, gait abnormalities, focal neurologic deficits, headache, chest pain, abdominal pain, back/neck pain, palpitations, or dyspnea  All other review of systems reviewed and noted to be negative  The patient denies any history of congestive heart failure, valvular head disease, coronary artery disease, venous thrombotic disease, or cardiac arrhythmia  Patient denies any recent change in medications  Patient affirms use of alcohol or illicit drugs  Patient is pregnant, had ultrasound a few weeks ago by OBGYN demonstrating likely spontaneous miscarriage  Has not followed with OBGYN as directed due to lack of a ride  Denies abdominal pain, vaginal bleeding or discharge  Patient denies any family history of sudden cardiac death  Patient does have history of moyamoya with prior CVAs    Patient denies any headache, focal weakness or sensory deficits  The patient denies any recent illness  The patient denies any recent trauma  Objective:  PHYSICAL EXAM:  Constitutional :  Non-toxic  Well developed, well-nourished with no acute distress  Eyes:  PERRL, EOMI  No resting nystagmus  Left sided horizontal nystagmus 2-3 beats, extinguishes  HENT: Atraumatic  Neck: no carotid bruit, no tenderness to palpitation  CV:  Regular rate and rhythm, no murmur  Peripheral pulses intact and equal   Respiratory:  Lungs clear to auscultation bilaterally  Abdomen:  Soft, non-tender, non-distended  Back:  No vertebral tenderness  Skin:  Normal color, warm and dry  Extremities:  Non-tender, no pedal edema  Neuro:  Alert and answers questions appropriately  Normal gait  Normal Romberg exam   No pronator drift  Normal finger to nose  Normal fine motor function with rapid finger movements  Normal hand tap  Cranial nerves II through XII grossly intact  Visual fields with left sided defect, chronic and unchanged per patient  Upper and lower motor strength 5/5 and symmetric  Normal light touch sensory exam    Normal DTRs  MDM  Patient presenting with syncope with a broad differential     EKG obtained and NSR with no acute findings  CBC will be obtained to evaluate hemoglobin and hematocrit to identify potential for anemia (specifically hematocrit less than 30) due to high risk factor  BMP to evaluate renal function, troponin to evaluate potential cardiac issues, and considering patient's history will obtain CT imaging of patient's head though patient denies any current symptoms of TIA/CVA  Will monitor in the ER and reevaluate      Patient's extensive history with neurologic issues due to her underlying pathology warrants additional evaluation as patient does not recall any of the events and there are no witnesses, could represent seizure disorder and monitoring and re-evaluation to ensure patient has appropriately medicated due to her difficulty with follow-up  Will admit patient for continued monitoring and evaluation by specialists  Dizziness       Prior to Admission Medications   Prescriptions Last Dose Informant Patient Reported? Taking? PARoxetine (PAXIL) 20 mg tablet   No No   Sig: Take 1 tablet by mouth daily   Prenatal Vit-Fe Fumarate-FA (GOODSENSE PRENATAL VITAMINS) 28-0 8 MG TABS   No No   Sig: Take 1 tablet by mouth daily   aspirin 81 mg chewable tablet   No No   Sig: Chew 4 tablets (324 mg total) daily   atorvastatin (LIPITOR) 40 mg tablet   No No   Sig: Take 1 tablet by mouth every evening   doxepin (SINEquan) 25 mg capsule   No No   Sig: Take 1 capsule by mouth daily at bedtime   gabapentin (NEURONTIN) 300 mg capsule   No No   Sig: Take 1 capsule by mouth 3 (three) times a day   levothyroxine 137 mcg tablet   No No   Sig: Take 1 tablet by mouth daily   nicotine (NICODERM CQ) 21 mg/24 hr TD 24 hr patch   No No   Sig: Place 1 patch on the skin daily   potassium chloride (K-DUR) 10 mEq tablet   No No   Sig: Take 1 tablet by mouth 2 (two) times a day      Facility-Administered Medications: None       Past Medical History:   Diagnosis Date    History of CVA (cerebrovascular accident)     Moyamoya disease     Tobacco use        Past Surgical History:   Procedure Laterality Date    BRAIN SURGERY      BRAIN SURGERY  04/10/2017    moyamoya       Family History   Problem Relation Age of Onset    Depression Mother     Heart disease Father     Hypertension Father     Diabetes Father     Breast cancer Maternal Grandmother      I have reviewed and agree with the history as documented  Social History   Substance Use Topics    Smoking status: Current Some Day Smoker     Packs/day: 0 50     Types: Cigarettes    Smokeless tobacco: Never Used    Alcohol use No        Review of Systems   Neurological: Positive for dizziness  All other systems reviewed and are negative        Physical Exam  Physical Exam    Vital Signs  ED Triage Vitals Temperature Pulse Respirations Blood Pressure SpO2   07/15/18 2347 07/15/18 2343 07/15/18 2343 07/15/18 2343 07/15/18 2343   97 8 °F (36 6 °C) 86 20 139/88 99 %      Temp Source Heart Rate Source Patient Position - Orthostatic VS BP Location FiO2 (%)   07/15/18 2347 07/16/18 0225 07/15/18 2343 07/15/18 2343 --   Oral Monitor Lying Right arm       Pain Score       --                  Vitals:    07/15/18 2343 07/16/18 0225   BP: 139/88 111/59   Pulse: 86 80   Patient Position - Orthostatic VS: Lying Lying       Visual Acuity      ED Medications  Medications   sodium chloride 0 9 % bolus 1,000 mL (1,000 mL Intravenous New Bag 7/16/18 0345)       Diagnostic Studies  Results Reviewed     Procedure Component Value Units Date/Time    Troponin I [[id number]]  (Normal) Collected:  07/16/18 0340    Lab Status:  Final result Specimen:  Blood from Arm, Right Updated:  07/16/18 0408     Troponin I <0 02 ng/mL     Basic metabolic panel [[id number]] Collected:  07/16/18 0340    Lab Status:  Final result Specimen:  Blood from Hand, Right Updated:  07/16/18 0400     Sodium 138 mmol/L      Potassium 3 9 mmol/L      Chloride 101 mmol/L      CO2 29 mmol/L      Anion Gap 8 mmol/L      BUN 8 mg/dL      Creatinine 0 71 mg/dL      Glucose 108 mg/dL      Calcium 9 2 mg/dL      eGFR 107 ml/min/1 73sq m     Narrative:         National Kidney Disease Education Program recommendations are as follows:  GFR calculation is accurate only with a steady state creatinine  Chronic Kidney disease less than 60 ml/min/1 73 sq  meters  Kidney failure less than 15 ml/min/1 73 sq  meters      CBC and differential [[id number]]  (Abnormal) Collected:  07/16/18 0340    Lab Status:  Final result Specimen:  Blood from Hand, Right Updated:  07/16/18 0349     WBC 11 73 (H) Thousand/uL      RBC 4 07 Million/uL      Hemoglobin 10 5 (L) g/dL      Hematocrit 33 6 (L) %      MCV 83 fL      MCH 25 8 (L) pg      MCHC 31 3 (L) g/dL      RDW 14 5 %      MPV 10 3 fL Platelets 038 Thousands/uL      nRBC 0 /100 WBCs      Neutrophils Relative 68 %      Immat GRANS % 0 %      Lymphocytes Relative 26 %      Monocytes Relative 5 %      Eosinophils Relative 1 %      Basophils Relative 0 %      Neutrophils Absolute 8 04 (H) Thousands/µL      Immature Grans Absolute 0 04 Thousand/uL      Lymphocytes Absolute 3 01 Thousands/µL      Monocytes Absolute 0 54 Thousand/µL      Eosinophils Absolute 0 07 Thousand/µL      Basophils Absolute 0 03 Thousands/µL                  CT head without contrast   ED Interpretation by Jace Rios MD (51/04 9445)   IMPRESSION:   1  Old PCA territory infarct involving posterior temporal/parietal and occipital parenchyma parietal   craniotomy  2  Mild atrophy and chronic microvascular change  Final Result by Tucker Reeves DO (07/16 8290)   1  Cerebral atrophy with chronic small vessel ischemic white matter disease and chronic right PCA territory infarction  2   No acute intracranial abnormality  Findings are consistent with the preliminary report from [facility] which was provided shortly after completion of the exam                      Workstation performed: [id number]                    Procedures  Procedures       Phone Contacts  ED Phone Contact    ED Course  ED Course as of Jul 16 0613 Mon Jul 16, 2018   2652   No events on cardiac monitoring  Discussed potential etiologies with the patient  Discussed follow-up and return precautions extensively with the patient  No additional events while in the emergency department  [facility]  CritCare Time    Disposition  Final diagnoses:   None     ED Disposition     None      Follow-up Information    None         Patient's Medications   Discharge Prescriptions    No medications on file     No discharge procedures on file      ED Provider  Electronically Signed by           Jace Rios MD  07/16/18 3548

## 2018-07-16 NOTE — ASSESSMENT & PLAN NOTE
Recent miscarriage per records, end of June, was introduced with Cytotec  Patient has not followed up with gyn  Reports scant vaginal bleed  1 pad per day  Sherrell Severance is pending     Will consult GYN

## 2018-07-16 NOTE — DISCHARGE INSTRUCTIONS
Dizziness   WHAT YOU NEED TO KNOW:   Dizziness is a feeling of being off balance or unsteady  Common causes of dizziness are an inner ear fluid imbalance or a lack of oxygen in your blood  Dizziness may be acute (lasts 3 days or less) or chronic (lasts longer than 3 days)  You may have dizzy spells that last from seconds to a few hours  DISCHARGE INSTRUCTIONS:   Return to the emergency department if:   · You have a headache and a stiff neck  · You have shaking chills and a fever  · You vomit over and over with no relief  · Your vomit or bowel movements are red or black  · You have pain in your chest, back, or abdomen  · You have numbness, especially in your face, arms, or legs  · You have trouble moving your arms or legs  · You are confused  Contact your healthcare provider if:   · You have a fever  · Your symptoms do not get better with treatment  · You have questions or concerns about your condition or care  Manage your symptoms:   · Do not drive  or operate heavy machinery when you are dizzy  · Get up slowly  from sitting or lying down  · Drink plenty of liquids  Liquids help prevent dehydration  Ask how much liquid to drink each day and which liquids are best for you  Follow up with your healthcare provider as directed:  Write down your questions so you remember to ask them during your visits  © 2017 [employer]  Information is for End User's use only and may not be sold, redistributed or otherwise used for commercial purposes  All illustrations and images included in CareNotes® are the copyrighted property of [employer]  or [employer]ra[employer]  The above information is an  only  It is not intended as medical advice for individual conditions or treatments  Talk to your doctor, nurse or pharmacist before following any medical regimen to see if it is safe and effective for you

## 2018-07-16 NOTE — H&P
H&P- Ricardo Naik 1980, 34 y o  female MRN: [id number]    Unit/Bed#: ED 06 Encounter: [id number]    Primary Care Provider: Nicholas Santoyo MD   Date and time admitted to hospital: 7/15/2018 11:40 PM        Syncope   Assessment & Plan    Patient report 2 episodes of syncope within the past few days  History of moyamoya disease  CT of the brain reviewed, hold PCA territory infarct involving posterior temporal/parietal and occipital parenchymal parietal craniotomy,  Cerebral atrophy with chronic small vessel ischemic white matter disease and chronic right PCA territory infarction    No acute intracranial abnormality      Initial  NIH 0  Neuro checks  Fall safety precaution  Neurology consult  Cardiology consult as well  Psychosocial problem   Assessment & Plan    Patient is homeless  Will consult case management for further assistance  * Dizziness   Assessment & Plan    "Im still a little dizzy"  Pre-hospital, associated with syncope X2  See syncope workup  Miscarriage within last 12 months   Assessment & Plan    Recent miscarriage per records, end of June, was introduced with Cytotec  Patient has not followed up with gyn  Reports scant vaginal bleed  1 pad per day  Awilda Burgos is pending  Will consult GYN        Hypothyroidism   Assessment & Plan    Monitor TSH  Continue levothyroxine  Tobacco use   Assessment & Plan    Smoking cessation encourage  Nicotine patch offered  VTE Prophylaxis: low risk  / sequential compression device   Code Status: full code  POLST: POLST form is not discussed and not completed at this time  Discussion with family: No family at the bedside    Anticipated Length of Stay:  Patient will be admitted on an Observation basis with an anticipated length of stay of  Less than 2 midnights  Justification for Hospital Stay: dizziness, report syncope    Total Time for Visit, including Counseling / Coordination of Care: 45 minutes  Greater than 50% of this total time spent on direct patient counseling and coordination of care  Chief Complaint:   Dizziness    History of Present Illness:    Haleigh Pereira is a 34 y o  female Hx of previous stroke, moyamoya disease, who presents with c/c of dizziness, syncope X2  Pt denies Chest pain, SOB, fever, chills, N/V/D  Recent miscarriage about 3 weeks ago  Reports serous vaginal discharge  Review of Systems:    Review of Systems   Respiratory: Negative for cough, chest tightness and shortness of breath  Genitourinary: Positive for vaginal discharge (Scant blood)  Neurological: Positive for dizziness, syncope, weakness and light-headedness  All other systems reviewed and are negative  Past Medical and Surgical History:     Past Medical History:   Diagnosis Date    History of CVA (cerebrovascular accident)     Moyamoya disease     Tobacco use        Past Surgical History:   Procedure Laterality Date    BRAIN SURGERY      BRAIN SURGERY  04/10/2017    moyamoya       Meds/Allergies:    Prior to Admission medications    Medication Sig Start Date End Date Taking?  Authorizing Provider   aspirin 81 mg chewable tablet Chew 4 tablets (324 mg total) daily 7/7/18   Leena Wright MD   atorvastatin (LIPITOR) 40 mg tablet Take 1 tablet by mouth every evening 10/10/17   NADEEN Olguin   doxepin (SINEquan) 25 mg capsule Take 1 capsule by mouth daily at bedtime 10/10/17   NADEEN Olguin   gabapentin (NEURONTIN) 300 mg capsule Take 1 capsule by mouth 3 (three) times a day 10/10/17   NADEEN Olguin   levothyroxine 137 mcg tablet Take 1 tablet by mouth daily 7/18/17   Akila Sotelo MD   nicotine (NICODERM CQ) 21 mg/24 hr TD 24 hr patch Place 1 patch on the skin daily 10/11/17   NADEEN Olguin   PARoxetine (PAXIL) 20 mg tablet Take 1 tablet by mouth daily 10/11/17   NADEEN Olguin   potassium chloride (K-DUR) 10 mEq tablet Take 1 tablet by mouth 2 (two) times a day 7/18/17   Maddie Cali MD   Prenatal Vit-Fe Fumarate-FA [facility] PRENATAL VITAMINS) 28-0 8 MG TABS Take 1 tablet by mouth daily 6/6/18   Shaw Jean DO     I have reviewed home medications with patient personally  Allergies: Allergies   Allergen Reactions    Penicillin G Hives       Social History:     Marital Status: Single   Occupation: Disabled  Patient Pre-hospital Living Situation: homeless  Patient Pre-hospital Level of Mobility:independent  Patient Pre-hospital Diet Restrictions: none  Substance Use History:   History   Alcohol Use No     History   Smoking Status    Current Some Day Smoker    Packs/day: 0 50    Types: Cigarettes   Smokeless Tobacco    Never Used     History   Drug Use No       Family History:    non-contributory    Physical Exam:     Vitals:   Blood Pressure: 117/62 (07/16/18 0631)  Pulse: 66 (07/16/18 0631)  Temperature: 97 8 °F (36 6 °C) (07/15/18 2347)  Temp Source: Oral (07/15/18 2347)  Respirations: 16 (07/16/18 0631)  SpO2: 98 % (07/16/18 0631)    Physical Exam   Constitutional: She is oriented to person, place, and time  She appears well-developed and well-nourished  No distress  HENT:   Head: Normocephalic and atraumatic  Nose: Nose normal    Mouth/Throat: Oropharynx is clear and moist    Neck: Normal range of motion  Neck supple  Cardiovascular: Normal rate, regular rhythm, normal heart sounds and intact distal pulses  Pulmonary/Chest: Effort normal and breath sounds normal  No accessory muscle usage  No respiratory distress  Abdominal: Soft  Bowel sounds are normal  She exhibits no distension  There is no tenderness  Genitourinary: Vaginal discharge (scant Vag bleed) found  Musculoskeletal: Normal range of motion  She exhibits no edema  Neurological: She is alert and oriented to person, place, and time  She has normal strength  No cranial nerve deficit or sensory deficit  GCS eye subscore is 4  GCS verbal subscore is 5   GCS motor subscore is 6  No focal neurological symptoms noted  Skin: Skin is warm and dry  She is not diaphoretic  Psychiatric: She has a normal mood and affect  Her behavior is normal    Nursing note and vitals reviewed  Additional Data:     Lab Results: I have personally reviewed pertinent reports  Results from last 7 days  Lab Units 07/16/18  0340   WBC Thousand/uL 11 73*   HEMOGLOBIN g/dL 10 5*   HEMATOCRIT % 33 6*   PLATELETS Thousands/uL 327   NEUTROS PCT % 68   LYMPHS PCT % 26   MONOS PCT % 5   EOS PCT % 1       Results from last 7 days  Lab Units 07/16/18  0340   SODIUM mmol/L 138   POTASSIUM mmol/L 3 9   CHLORIDE mmol/L 101   CO2 mmol/L 29   BUN mg/dL 8   CREATININE mg/dL 0 71   CALCIUM mg/dL 9 2   GLUCOSE RANDOM mg/dL 108                   Imaging: I have personally reviewed pertinent reports  CT head without contrast   ED Interpretation by Tashi Ramos MD (08/87 8904)   IMPRESSION:   1  Old PCA territory infarct involving posterior temporal/parietal and occipital parenchyma parietal   craniotomy  2  Mild atrophy and chronic microvascular change  Final Result by Fahad Adamson DO (07/16 9892)   1  Cerebral atrophy with chronic small vessel ischemic white matter disease and chronic right PCA territory infarction  2   No acute intracranial abnormality  Findings are consistent with the preliminary report from [facility] which was provided shortly after completion of the exam                      Workstation performed: [id number]             EKG, Pathology, and Other Studies Reviewed on Admission:   · EKG:  Non STEMI    Allscripts / Epic Records Reviewed: Yes     ** Please Note: This note has been constructed using a voice recognition system   **

## 2018-07-17 VITALS
SYSTOLIC BLOOD PRESSURE: 122 MMHG | OXYGEN SATURATION: 97 % | HEART RATE: 88 BPM | WEIGHT: 179.9 LBS | HEIGHT: 61 IN | DIASTOLIC BLOOD PRESSURE: 70 MMHG | TEMPERATURE: 98.1 F | RESPIRATION RATE: 18 BRPM | BODY MASS INDEX: 33.96 KG/M2

## 2018-07-17 LAB
ANION GAP SERPL CALCULATED.3IONS-SCNC: 10 MMOL/L (ref 4–13)
BASOPHILS # BLD AUTO: 0.03 THOUSANDS/ΜL (ref 0–0.1)
BASOPHILS NFR BLD AUTO: 0 % (ref 0–1)
BUN SERPL-MCNC: 9 MG/DL (ref 5–25)
CALCIUM SERPL-MCNC: 8.8 MG/DL (ref 8.3–10.1)
CHLORIDE SERPL-SCNC: 105 MMOL/L (ref 100–108)
CHOLEST SERPL-MCNC: 209 MG/DL (ref 50–200)
CO2 SERPL-SCNC: 25 MMOL/L (ref 21–32)
CREAT SERPL-MCNC: 0.79 MG/DL (ref 0.6–1.3)
EOSINOPHIL # BLD AUTO: 0.12 THOUSAND/ΜL (ref 0–0.61)
EOSINOPHIL NFR BLD AUTO: 1 % (ref 0–6)
ERYTHROCYTE [DISTWIDTH] IN BLOOD BY AUTOMATED COUNT: 14.6 % (ref 11.6–15.1)
EST. AVERAGE GLUCOSE BLD GHB EST-MCNC: 120 MG/DL
GFR SERPL CREATININE-BSD FRML MDRD: 94 ML/MIN/1.73SQ M
GLUCOSE P FAST SERPL-MCNC: 117 MG/DL (ref 65–99)
GLUCOSE SERPL-MCNC: 117 MG/DL (ref 65–140)
HBA1C MFR BLD: 5.8 % (ref 4.2–6.3)
HCT VFR BLD AUTO: 31.9 % (ref 34.8–46.1)
HDLC SERPL-MCNC: 34 MG/DL (ref 40–60)
HGB BLD-MCNC: 9.8 G/DL (ref 11.5–15.4)
IMM GRANULOCYTES # BLD AUTO: 0.03 THOUSAND/UL (ref 0–0.2)
IMM GRANULOCYTES NFR BLD AUTO: 0 % (ref 0–2)
LDLC SERPL CALC-MCNC: 154 MG/DL (ref 0–100)
LYMPHOCYTES # BLD AUTO: 3.36 THOUSANDS/ΜL (ref 0.6–4.47)
LYMPHOCYTES NFR BLD AUTO: 39 % (ref 14–44)
MAGNESIUM SERPL-MCNC: 2.1 MG/DL (ref 1.6–2.6)
MCH RBC QN AUTO: 25.6 PG (ref 26.8–34.3)
MCHC RBC AUTO-ENTMCNC: 30.7 G/DL (ref 31.4–37.4)
MCV RBC AUTO: 83 FL (ref 82–98)
MONOCYTES # BLD AUTO: 0.5 THOUSAND/ΜL (ref 0.17–1.22)
MONOCYTES NFR BLD AUTO: 6 % (ref 4–12)
NEUTROPHILS # BLD AUTO: 4.62 THOUSANDS/ΜL (ref 1.85–7.62)
NEUTS SEG NFR BLD AUTO: 54 % (ref 43–75)
NRBC BLD AUTO-RTO: 0 /100 WBCS
PLATELET # BLD AUTO: 270 THOUSANDS/UL (ref 149–390)
PMV BLD AUTO: 10 FL (ref 8.9–12.7)
POTASSIUM SERPL-SCNC: 4.5 MMOL/L (ref 3.5–5.3)
RBC # BLD AUTO: 3.83 MILLION/UL (ref 3.81–5.12)
SODIUM SERPL-SCNC: 140 MMOL/L (ref 136–145)
TRIGL SERPL-MCNC: 107 MG/DL
WBC # BLD AUTO: 8.66 THOUSAND/UL (ref 4.31–10.16)

## 2018-07-17 PROCEDURE — 85025 COMPLETE CBC W/AUTO DIFF WBC: CPT | Performed by: NURSE PRACTITIONER

## 2018-07-17 PROCEDURE — 80061 LIPID PANEL: CPT | Performed by: NURSE PRACTITIONER

## 2018-07-17 PROCEDURE — 83735 ASSAY OF MAGNESIUM: CPT | Performed by: NURSE PRACTITIONER

## 2018-07-17 PROCEDURE — G8978 MOBILITY CURRENT STATUS: HCPCS

## 2018-07-17 PROCEDURE — 97163 PT EVAL HIGH COMPLEX 45 MIN: CPT

## 2018-07-17 PROCEDURE — 83036 HEMOGLOBIN GLYCOSYLATED A1C: CPT | Performed by: NURSE PRACTITIONER

## 2018-07-17 PROCEDURE — G8979 MOBILITY GOAL STATUS: HCPCS

## 2018-07-17 PROCEDURE — 80048 BASIC METABOLIC PNL TOTAL CA: CPT | Performed by: NURSE PRACTITIONER

## 2018-07-17 RX ORDER — POTASSIUM CHLORIDE 750 MG/1
10 TABLET, EXTENDED RELEASE ORAL 2 TIMES DAILY
Qty: 30 TABLET | Refills: 0 | Status: SHIPPED | OUTPATIENT
Start: 2018-07-17

## 2018-07-17 RX ORDER — NICOTINE 21 MG/24HR
1 PATCH, TRANSDERMAL 24 HOURS TRANSDERMAL DAILY
Qty: 28 PATCH | Refills: 0 | Status: SHIPPED | OUTPATIENT
Start: 2018-07-18 | End: 2018-10-12 | Stop reason: HOSPADM

## 2018-07-17 RX ADMIN — GABAPENTIN 100 MG: 100 CAPSULE ORAL at 09:20

## 2018-07-17 RX ADMIN — POTASSIUM CHLORIDE 10 MEQ: 750 TABLET, EXTENDED RELEASE ORAL at 17:07

## 2018-07-17 RX ADMIN — PAROXETINE HYDROCHLORIDE 20 MG: 20 TABLET, FILM COATED ORAL at 09:20

## 2018-07-17 RX ADMIN — POTASSIUM CHLORIDE 10 MEQ: 750 TABLET, EXTENDED RELEASE ORAL at 09:19

## 2018-07-17 RX ADMIN — ATORVASTATIN CALCIUM 40 MG: 40 TABLET, FILM COATED ORAL at 17:07

## 2018-07-17 RX ADMIN — ASPIRIN 81 MG CHEWABLE TABLET 81 MG: 81 TABLET CHEWABLE at 09:19

## 2018-07-17 RX ADMIN — LEVOTHYROXINE SODIUM 137 MCG: 112 TABLET ORAL at 05:36

## 2018-07-17 RX ADMIN — NICOTINE 1 PATCH: 14 PATCH, EXTENDED RELEASE TRANSDERMAL at 09:21

## 2018-07-17 NOTE — SOCIAL WORK
Transportation was requested for pt to go home  Pt will be transported to [address] [place] [address] [place] since she lives near the area but does not have an address  A taxi voucher will be provided to pt  Pt has no other needs at this time

## 2018-07-17 NOTE — PHYSICAL THERAPY NOTE
Physical Therapy Evaluation     Patient's Name: Param Young    Admitting Diagnosis  Dizziness [R42]  Syncope [R55]  Homelessness [Z59 0]  History of CVA (cerebrovascular accident) [Z86 73]  Tobacco use [Z72 0]  Miscarriage within last 12 months [Z87 59]    Problem List  Patient Active Problem List   Diagnosis    History of CVA (cerebrovascular accident)    Tobacco use    Moyamoya disease    Leukocytosis    Hypothyroidism    Psychosocial problem    Homeless    Dizziness    Miscarriage within last 12 months    Syncope       Past Medical History  Past Medical History:   Diagnosis Date    History of CVA (cerebrovascular accident)     Moyamoya disease     Tobacco use        Past Surgical History  Past Surgical History:   Procedure Laterality Date    BRAIN SURGERY      BRAIN SURGERY  04/10/2017    moyamoya      07/17/18 1117   Note Type   Note type Eval only   Pain Assessment   Pain Assessment No/denies pain   Pain Score No Pain  0/10 pre and post session   Home Living   Type of Home Homeless  (lives in a tent)   Home Equipment (no AD used at baseline)   Additional Comments Pt reports being Indep at baseline   Prior Function   Level of [place] Independent with ADLs and functional mobility   Lives With Alone   Receives Help From Neighbor  (if needed)   ADL Assistance Independent   IADLs Independent   Falls in the last 6 months 1 to 4  (3 falls within last 24 hours PTA)   Vocational Unemployed   Comments 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; utilizes [employer] for meals   Restrictions/Precautions   Wells [place] Bearing Precautions Per Order No   Braces or Orthoses (none per pt )   Other Precautions Chair Alarm; Bed Alarm; Fall Risk;Telemetry   General   Family/Caregiver Present No   Cognition   Overall Cognitive Status WFL   Arousal/Participation Alert   Attention Within functional limits   Orientation Level Oriented X4   Memory Within functional limits   Following Commands Follows all commands and directions without difficulty   Comments pt agreeable to PT IE   RUE Assessment   RUE Assessment WFL   LUE Assessment   LUE Assessment WFL   RLE Assessment   RLE Assessment WFL  (grossly assessed w functional mobility: at least 3+/5)   LLE Assessment   LLE Assessment WFL  (grossly assessed w functional mobility: at least 3+/5)   Coordination   Movements are Fluid and Coordinated 1   Sensation [facility]   Light Touch   RLE Light Touch Grossly intact   LLE Light Touch Grossly intact   Bed Mobility   Supine to Sit 5  Supervision   Additional items Assist x 1; Increased time required;Verbal cues;HOB elevated   Sit to Supine 5  Supervision   Additional items Assist x 1; Increased time required;Verbal cues;HOB elevated   Transfers   Sit to Stand 4  Minimal assistance  (CGA)   Additional items Assist x 1; Increased time required;Verbal cues   Stand to Sit 4  Minimal assistance  (CGA)   Additional items Assist x 1; Increased time required;Verbal cues    Pt denying any lightheadedness or dizziness throughout phases of mobility  Just notes feeling increased fatigue this date- RN notified  Ambulation/Elevation   Gait pattern Decreased foot clearance; Short stride; Step to;Excessively slow   Gait Assistance 4  Minimal assist  (CGA)   Additional items Assist x 1;Verbal cues; Tactile cues   Assistive Device None   Distance 15' x2, further distance declined by pt 2* fatigue   Stair Management Assistance Not tested   Balance   Static Sitting Good   Dynamic Sitting Good   Static Standing Fair   Dynamic Standing Fair   Ambulatory Fair -   Endurance Deficit   Endurance Deficit Yes   Activity Tolerance   Activity Tolerance Patient limited by fatigue   Medical Staff Made Aware spoke to Celina Alcazar- arlene, noted she feels that her speech is worse today, RN also notified  Spoke to Cele regarding PT recs   Nurse Made Aware Lluvia WILLINGHAM verbalized pt appropriate for PT session  Notified of outcomes/recs   RN reporting pt has been ambulatory to/from  since admission  Assessment   Prognosis Good   Problem List Decreased strength;Decreased endurance; Impaired balance;Decreased mobility   Assessment Pt is 34 y o  female seen for PT evaluation on 7/17/2018 [facility]/ Dizziness  Pt presents with a known history of moyamoya presenting with a syncopal episode  Patient has a finding on CT scan of old PCA territory infarction and partial craniotomy PT consulted to assess pt's functional mobility and d/c needs  Order placed for PT eval and tx, w/ up and OOB as tolerated order  Performed at least 2 patient identifiers during session: Name and wristband  Comorbidities affecting pt's physical performance at time of assessment include: h/o CVA, moyamoya disease, tobacco use, h/o brain surgery  PTA, pt was independent w/ all functional mobility w/ no AD/DME and homeless and living in a tent, receives meals from local facilities  Personal factors affecting pt at time of IE include: inability to navigate community distances, inability to navigate level surfaces w/o external assistance, limited home support, positive fall history and decreased initiation and engagement  Please find objective findings from PT assessment regarding body systems outlined above with impairments and limitations including weakness, impaired balance, decreased endurance, gait deviations, decreased activity tolerance, decreased safety awareness and fall risk, as well as mobility assessment (need for SBA-CGA assist w/ all phases of mobility when usually ambulating independently and need for cueing for mobility technique)  The following objective measures performed on IE also reveal limitations: Barthel Index: 55/100 and Modified [place]: 4 (moderate/severe disability)   Pt's clinical presentation is currently unstable/unpredictable seen in pt's presentation of need for input for task focus and mobility technique, need for SBA-CGA assist w/ all phases of mobility when usually mobilizing independently, on telemetry monitoring and ongoing medical assessment  Pt to benefit from continued PT tx to address deficits as defined above and maximize level of functional independent mobility and consistency  From PT/mobility standpoint, recommendation at time of d/c would be STR pending progress in order to facilitate return to PLOF  Barriers to Discharge Inaccessible home environment;Decreased caregiver support   Goals   Patient Goals to get stronger   STG Expiration Date 07/27/18   Short Term Goal #1 In 7-10 days: Increase bilateral LE strength 1/2 grade to facilitate independent mobility, Perform all bed mobility tasks independently to decrease caregiver burden, Perform all transfers independently to improve independence, Ambulate > 150 ft  with least restrictive assistive device independently w/o LOB and w/ normalized gait pattern 100% of the time, Increase all balance 1/2 grade to decrease risk for falls, Complete exercise program independently, Tolerate 4 hr OOB to faciliate upright tolerance, Improve Barthel Index score to 70 or greater to facilitate independence and PT provider will perform functional balance assessment to determine fall risk   Treatment Day 0   Plan   Treatment/Interventions Functional transfer training;LE strengthening/ROM; Therapeutic exercise; Endurance training;Patient/family training;Equipment eval/education; Bed mobility;Gait training;Spoke to nursing;Spoke to case management;ST   PT Frequency 5x/wk; Weekend   Recommendation   Recommendation Short-term skilled PT; Speech consult; Rehab consult   Equipment Recommended (TBD)   PT - OK to Discharge Yes  (when medically cleared if to STR)   Modified Barber Scale   Modified Barber Scale 4   Barthel Index   Feeding 10   Bathing 0   Grooming Score 5   Dressing Score 5   Bladder Score 10   Bowels Score 10   Toilet Use Score 5   Transfers (Bed/Chair) Score 10   Mobility (Level Surface) Score 0 Stairs Score 0   Barthel Index Score 55           Juliet Chatman, PT, DPT

## 2018-07-17 NOTE — CASE MANAGEMENT
Continued Stay Review    Date: 7/17/18   OBSERVATION      Vital Signs: /68 (BP Location: Right arm)   Pulse 66   Temp 98 4 °F (36 9 °C) (Oral)   Resp 18   Ht 5' 1" (1 549 m)   Wt 81 6 kg (179 lb 14 3 oz)   LMP  (LMP Unknown)   SpO2 95%   BMI 33 99 kg/m²     Medications:   Scheduled Meds:   Current Facility-Administered Medications:  acetaminophen 650 mg Oral Q4H PRN   aspirin 81 mg Oral Daily   atorvastatin 40 mg Oral QPM   doxepin 25 mg Oral HS   gabapentin 100 mg Oral TID   levothyroxine 137 mcg Oral Daily   nicotine 1 patch Transdermal Daily   PARoxetine 20 mg Oral Daily   potassium chloride 10 mEq Oral BID     Abnormal Labs/Diagnostic Results:   7/16: MRA head: Findings compatible with moyamoya disease  Numerous tiny collateral vessels are noted in the basal ganglia bilaterally  Asymmetrically large left posterior cerebral artery provides flow to the posterior aspect of the anterior cerebral distribution  Transosseous flow on the right extending from temporal artery to right sylvian fissure MCA branches is likely from prior ECA ICA bypass  MRI brain: nothing acute  MRA carotids: Mild to moderate stenosis at the carotid bifurcation bilaterally  The vessels remain patent but small throughout their course in the neck  ECHO: EF 55-65%  Mild mitral, aortic, tricuspid, pulmonic regurg    7/17:  EEG abnormal study due to dysrhythmic activities emanating from the by sylvian region with a left hemispheric predominance  This type of abnormality suggests cortical and subcortical dysfunction in these regions  An underlying structural lesion the left and/or both sylvian head regions cannot be excluded  No epileptiform abnormalities were noted  Age/Sex: 34 y o  female     Assessment/Plan:   PER MED 7/17:   Chest decreased  Await testing as requested by neurology  Discharge Plan: tbd; per SW notes: lives alone in a tent  Refusing info on homeless shelters   Has interview set up to assist with housing  Has a place to shower, do laundry, and receive mail  Set up with [facility] careworker  PER NEURO 7/17:  Reviewed MRI/MRA/MRV  Not acute other than old right PCA infarct  MRA with ECA-ICA bypass noted    Her orthostatics are positive which in combination with already stenosed ICAs from Moyamoya can easily contribute to syncope when standing up even if mildly dehydrated    Stay hydrated, add extra salt to diet (if okay with primary team) and compression stockings may help as well  0-1 cup of caffeine maximum per day    EEG routine with dysythmic activity from left hemisphere with no clear epileptiform discharges   If patient has remained with no new intermittent weakness or confusion- would not add AED at this time, would follow up with her OP neurologist and recommend ambulatory EEG    No driving     No further neurodiagnostics

## 2018-07-17 NOTE — PLAN OF CARE
Problem: PHYSICAL THERAPY ADULT  Goal: Performs mobility at highest level of function for planned discharge setting  See evaluation for individualized goals  Treatment/Interventions: Functional transfer training, LE strengthening/ROM, Therapeutic exercise, Endurance training, Patient/family training, Equipment eval/education, Bed mobility, Gait training, Spoke to nursing, Spoke to case management, ST  Equipment Recommended:  (TBD)       See flowsheet documentation for full assessment, interventions and recommendations  Prognosis: Good  Problem List: Decreased strength, Decreased endurance, Impaired balance, Decreased mobility  Assessment: Pt is 34 y o  female seen for PT evaluation on 7/17/2018 [facility] w/ Dizziness  Pt presents with a known history of moyamoya presenting with a syncopal episode  Patient has a finding on CT scan of old PCA territory infarction and partial craniotomy PT consulted to assess pt's functional mobility and d/c needs  Order placed for PT eval and tx, w/ up and OOB as tolerated order  Performed at least 2 patient identifiers during session: Name and wristband  Comorbidities affecting pt's physical performance at time of assessment include: h/o CVA, moyamoya disease, tobacco use, h/o brain surgery  PTA, pt was independent w/ all functional mobility w/ no AD/DME and homeless and living in a tent, receives meals from local facilities  Personal factors affecting pt at time of IE include: inability to navigate community distances, inability to navigate level surfaces w/o external assistance, limited home support, positive fall history and decreased initiation and engagement   Please find objective findings from PT assessment regarding body systems outlined above with impairments and limitations including weakness, impaired balance, decreased endurance, gait deviations, decreased activity tolerance, decreased safety awareness and fall risk, as well as mobility assessment (need for SBA-CGA assist w/ all phases of mobility when usually ambulating independently and need for cueing for mobility technique)  The following objective measures performed on IE also reveal limitations: Barthel Index: 55/100 and Modified [place]: 4 (moderate/severe disability)  Pt's clinical presentation is currently unstable/unpredictable seen in pt's presentation of need for input for task focus and mobility technique, need for SBA-CGA assist w/ all phases of mobility when usually mobilizing independently, on telemetry monitoring and ongoing medical assessment  Pt to benefit from continued PT tx to address deficits as defined above and maximize level of functional independent mobility and consistency  From PT/mobility standpoint, recommendation at time of d/c would be STR pending progress in order to facilitate return to PLOF  Barriers to Discharge: Inaccessible home environment, Decreased caregiver support     Recommendation: Short-term skilled PT, Rehab consult, Speech consult     PT - OK to Discharge: Yes (when medically cleared if to STR)    See flowsheet documentation for full assessment

## 2018-07-17 NOTE — PROGRESS NOTES
Reviewed MRI/MRA/MRV  Not acute other than old right PCA infarct  MRA with ECA-ICA bypass noted  Her orthostatics are positive which in combination with already stenosed ICAs from Moyamoya can easily contribute to syncope when standing up even if mildly dehydrated  Stay hydrated, add extra salt to diet (if okay with primary team) and compression stockings may help as well  0-1 cup of caffeine maximum per day  EEG routine with dysythmic activity from left hemisphere with no clear epileptiform discharges  If patient has remained with no new intermittent weakness or confusion- would not add AED at this time, would follow up with her OP neurologist and recommend ambulatory EEG  No driving  No further neurodiagnostics, call if needed

## 2018-07-17 NOTE — PLAN OF CARE
Problem: DISCHARGE PLANNING - CARE MANAGEMENT  Goal: Discharge to post-acute care or home with appropriate resources  INTERVENTIONS:  - Conduct assessment to determine patient/family and health care team treatment goals, and need for post-acute services based on payer coverage, community resources, and patient preferences, and barriers to discharge  - Address psychosocial, clinical, and financial barriers to discharge as identified in assessment in conjunction with the patient/family and health care team  - Arrange appropriate level of post-acute services according to patients   needs and preference and payer coverage in collaboration with the physician and health care team  - Communicate with and update the patient/family, physician, and health care team regarding progress on the discharge plan  - Arrange appropriate transportation to post-acute venues   Outcome: Progressing  Transportation was requested for pt to go home  Pt will be transported to [address] [place] [address] [place] since she lives near the area but does not have an address  A taxi voucher will be provided to pt  Pt has no other needs at this time

## 2018-07-20 ENCOUNTER — TELEPHONE (OUTPATIENT)
Dept: CARDIOLOGY CLINIC | Facility: CLINIC | Age: 38
End: 2018-07-20

## 2018-08-24 ENCOUNTER — APPOINTMENT (INPATIENT)
Dept: MRI IMAGING | Facility: HOSPITAL | Age: 38
DRG: 058 | End: 2018-08-24
Payer: COMMERCIAL

## 2018-08-24 ENCOUNTER — HOSPITAL ENCOUNTER (INPATIENT)
Facility: HOSPITAL | Age: 38
LOS: 3 days | Discharge: HOME | DRG: 058 | End: 2018-08-27
Attending: EMERGENCY MEDICINE | Admitting: FAMILY MEDICINE
Payer: COMMERCIAL

## 2018-08-24 ENCOUNTER — APPOINTMENT (EMERGENCY)
Dept: CT IMAGING | Facility: HOSPITAL | Age: 38
DRG: 058 | End: 2018-08-24
Payer: COMMERCIAL

## 2018-08-24 DIAGNOSIS — H53.9 VISUAL CHANGES: ICD-10-CM

## 2018-08-24 DIAGNOSIS — I63.9 CVA (CEREBRAL VASCULAR ACCIDENT) (HCC): Primary | ICD-10-CM

## 2018-08-24 DIAGNOSIS — I63.9 ACUTE CVA (CEREBROVASCULAR ACCIDENT) (HCC): ICD-10-CM

## 2018-08-24 PROBLEM — H53.8 BLURRY VISION, LEFT EYE: Status: ACTIVE | Noted: 2018-08-24

## 2018-08-24 LAB
ALBUMIN SERPL BCP-MCNC: 3.7 G/DL (ref 3.5–5)
ALP SERPL-CCNC: 115 U/L (ref 46–116)
ALT SERPL W P-5'-P-CCNC: 35 U/L (ref 12–78)
ANION GAP BLD CALC-SCNC: 16 MMOL/L (ref 4–13)
ANION GAP SERPL CALCULATED.3IONS-SCNC: 12 MMOL/L (ref 4–13)
APTT PPP: 28 SECONDS (ref 24–36)
AST SERPL W P-5'-P-CCNC: 14 U/L (ref 5–45)
ATRIAL RATE: 99 BPM
BASOPHILS # BLD AUTO: 0.04 THOUSANDS/ΜL (ref 0–0.1)
BASOPHILS NFR BLD AUTO: 0 % (ref 0–1)
BILIRUB SERPL-MCNC: 0.3 MG/DL (ref 0.2–1)
BUN BLD-MCNC: 7 MG/DL (ref 5–25)
BUN SERPL-MCNC: 8 MG/DL (ref 5–25)
CA-I BLD-SCNC: 1.1 MMOL/L (ref 1.12–1.32)
CALCIUM SERPL-MCNC: 9.2 MG/DL (ref 8.3–10.1)
CHLORIDE BLD-SCNC: 103 MMOL/L (ref 100–108)
CHLORIDE SERPL-SCNC: 104 MMOL/L (ref 100–108)
CO2 SERPL-SCNC: 26 MMOL/L (ref 21–32)
CREAT BLD-MCNC: 0.6 MG/DL (ref 0.6–1.3)
CREAT SERPL-MCNC: 0.73 MG/DL (ref 0.6–1.3)
EOSINOPHIL # BLD AUTO: 0.12 THOUSAND/ΜL (ref 0–0.61)
EOSINOPHIL NFR BLD AUTO: 1 % (ref 0–6)
ERYTHROCYTE [DISTWIDTH] IN BLOOD BY AUTOMATED COUNT: 16.3 % (ref 11.6–15.1)
GFR SERPL CREATININE-BSD FRML MDRD: 103 ML/MIN/1.73SQ M
GFR SERPL CREATININE-BSD FRML MDRD: 114 ML/MIN/1.73SQ M
GLUCOSE SERPL-MCNC: 106 MG/DL (ref 65–140)
GLUCOSE SERPL-MCNC: 139 MG/DL (ref 65–140)
HCG SERPL QL: NEGATIVE
HCT VFR BLD AUTO: 36.7 % (ref 34.8–46.1)
HCT VFR BLD CALC: 32 % (ref 34.8–46.1)
HGB BLD-MCNC: 11.3 G/DL (ref 11.5–15.4)
HGB BLDA-MCNC: 10.9 G/DL (ref 11.5–15.4)
IMM GRANULOCYTES # BLD AUTO: 0.03 THOUSAND/UL (ref 0–0.2)
IMM GRANULOCYTES NFR BLD AUTO: 0 % (ref 0–2)
INR PPP: 0.99 (ref 0.86–1.17)
LYMPHOCYTES # BLD AUTO: 3.51 THOUSANDS/ΜL (ref 0.6–4.47)
LYMPHOCYTES NFR BLD AUTO: 27 % (ref 14–44)
MCH RBC QN AUTO: 23.5 PG (ref 26.8–34.3)
MCHC RBC AUTO-ENTMCNC: 30.8 G/DL (ref 31.4–37.4)
MCV RBC AUTO: 77 FL (ref 82–98)
MONOCYTES # BLD AUTO: 0.62 THOUSAND/ΜL (ref 0.17–1.22)
MONOCYTES NFR BLD AUTO: 5 % (ref 4–12)
NEUTROPHILS # BLD AUTO: 8.88 THOUSANDS/ΜL (ref 1.85–7.62)
NEUTS SEG NFR BLD AUTO: 67 % (ref 43–75)
NRBC BLD AUTO-RTO: 0 /100 WBCS
P AXIS: 68 DEGREES
PCO2 BLD: 25 MMOL/L (ref 21–32)
PLATELET # BLD AUTO: 425 THOUSANDS/UL (ref 149–390)
PMV BLD AUTO: 10.2 FL (ref 8.9–12.7)
POTASSIUM BLD-SCNC: 3.7 MMOL/L (ref 3.5–5.3)
POTASSIUM SERPL-SCNC: 3.9 MMOL/L (ref 3.5–5.3)
PR INTERVAL: 136 MS
PROT SERPL-MCNC: 7.5 G/DL (ref 6.4–8.2)
PROTHROMBIN TIME: 13 SECONDS (ref 11.8–14.2)
QRS AXIS: 93 DEGREES
QRSD INTERVAL: 72 MS
QT INTERVAL: 354 MS
QTC INTERVAL: 454 MS
RBC # BLD AUTO: 4.8 MILLION/UL (ref 3.81–5.12)
SODIUM BLD-SCNC: 139 MMOL/L (ref 136–145)
SODIUM SERPL-SCNC: 142 MMOL/L (ref 136–145)
SPECIMEN SOURCE: ABNORMAL
T WAVE AXIS: 68 DEGREES
TROPONIN I SERPL-MCNC: <0.02 NG/ML
VENTRICULAR RATE: 99 BPM
WBC # BLD AUTO: 13.2 THOUSAND/UL (ref 4.31–10.16)

## 2018-08-24 PROCEDURE — 93005 ELECTROCARDIOGRAM TRACING: CPT

## 2018-08-24 PROCEDURE — 84484 ASSAY OF TROPONIN QUANT: CPT | Performed by: EMERGENCY MEDICINE

## 2018-08-24 PROCEDURE — 70551 MRI BRAIN STEM W/O DYE: CPT

## 2018-08-24 PROCEDURE — 70496 CT ANGIOGRAPHY HEAD: CPT

## 2018-08-24 PROCEDURE — 85610 PROTHROMBIN TIME: CPT | Performed by: EMERGENCY MEDICINE

## 2018-08-24 PROCEDURE — 85014 HEMATOCRIT: CPT

## 2018-08-24 PROCEDURE — 85025 COMPLETE CBC W/AUTO DIFF WBC: CPT | Performed by: EMERGENCY MEDICINE

## 2018-08-24 PROCEDURE — 80047 BASIC METABLC PNL IONIZED CA: CPT

## 2018-08-24 PROCEDURE — 99223 1ST HOSP IP/OBS HIGH 75: CPT | Performed by: FAMILY MEDICINE

## 2018-08-24 PROCEDURE — 93010 ELECTROCARDIOGRAM REPORT: CPT | Performed by: INTERNAL MEDICINE

## 2018-08-24 PROCEDURE — 36415 COLL VENOUS BLD VENIPUNCTURE: CPT | Performed by: EMERGENCY MEDICINE

## 2018-08-24 PROCEDURE — 85730 THROMBOPLASTIN TIME PARTIAL: CPT | Performed by: EMERGENCY MEDICINE

## 2018-08-24 PROCEDURE — 84703 CHORIONIC GONADOTROPIN ASSAY: CPT | Performed by: EMERGENCY MEDICINE

## 2018-08-24 PROCEDURE — 70498 CT ANGIOGRAPHY NECK: CPT

## 2018-08-24 PROCEDURE — 80053 COMPREHEN METABOLIC PANEL: CPT | Performed by: EMERGENCY MEDICINE

## 2018-08-24 PROCEDURE — 99285 EMERGENCY DEPT VISIT HI MDM: CPT

## 2018-08-24 RX ORDER — GABAPENTIN 300 MG/1
300 CAPSULE ORAL 3 TIMES DAILY
Status: DISCONTINUED | OUTPATIENT
Start: 2018-08-24 | End: 2018-08-27 | Stop reason: HOSPADM

## 2018-08-24 RX ORDER — POTASSIUM CHLORIDE 750 MG/1
10 TABLET, EXTENDED RELEASE ORAL 2 TIMES DAILY
Status: DISCONTINUED | OUTPATIENT
Start: 2018-08-24 | End: 2018-08-27 | Stop reason: HOSPADM

## 2018-08-24 RX ORDER — ASPIRIN 325 MG
325 TABLET ORAL DAILY
Status: DISCONTINUED | OUTPATIENT
Start: 2018-08-25 | End: 2018-08-24

## 2018-08-24 RX ORDER — ONDANSETRON 2 MG/ML
4 INJECTION INTRAMUSCULAR; INTRAVENOUS EVERY 6 HOURS PRN
Status: DISCONTINUED | OUTPATIENT
Start: 2018-08-24 | End: 2018-08-27 | Stop reason: HOSPADM

## 2018-08-24 RX ORDER — CLONAZEPAM 0.5 MG/1
0.5 TABLET ORAL 2 TIMES DAILY
Status: DISCONTINUED | OUTPATIENT
Start: 2018-08-24 | End: 2018-08-26

## 2018-08-24 RX ORDER — ATORVASTATIN CALCIUM 40 MG/1
40 TABLET, FILM COATED ORAL EVERY EVENING
Status: DISCONTINUED | OUTPATIENT
Start: 2018-08-24 | End: 2018-08-27 | Stop reason: HOSPADM

## 2018-08-24 RX ORDER — NICOTINE 21 MG/24HR
21 PATCH, TRANSDERMAL 24 HOURS TRANSDERMAL ONCE
Status: COMPLETED | OUTPATIENT
Start: 2018-08-24 | End: 2018-08-25

## 2018-08-24 RX ORDER — ACETAMINOPHEN 325 MG/1
650 TABLET ORAL EVERY 4 HOURS PRN
Status: DISCONTINUED | OUTPATIENT
Start: 2018-08-24 | End: 2018-08-27 | Stop reason: HOSPADM

## 2018-08-24 RX ORDER — CLOPIDOGREL BISULFATE 75 MG/1
75 TABLET ORAL DAILY
Status: DISCONTINUED | OUTPATIENT
Start: 2018-08-25 | End: 2018-08-27

## 2018-08-24 RX ORDER — PAROXETINE HYDROCHLORIDE 20 MG/1
20 TABLET, FILM COATED ORAL DAILY
Status: DISCONTINUED | OUTPATIENT
Start: 2018-08-25 | End: 2018-08-27 | Stop reason: HOSPADM

## 2018-08-24 RX ORDER — DOXEPIN HYDROCHLORIDE 25 MG/1
25 CAPSULE ORAL
Status: DISCONTINUED | OUTPATIENT
Start: 2018-08-24 | End: 2018-08-26

## 2018-08-24 RX ADMIN — NICOTINE 21 MG: 21 PATCH TRANSDERMAL at 17:20

## 2018-08-24 RX ADMIN — CLONAZEPAM 0.5 MG: 0.5 TABLET ORAL at 19:06

## 2018-08-24 RX ADMIN — DOXEPIN HYDROCHLORIDE 25 MG: 25 CAPSULE ORAL at 22:24

## 2018-08-24 RX ADMIN — POTASSIUM CHLORIDE 10 MEQ: 750 TABLET, EXTENDED RELEASE ORAL at 19:06

## 2018-08-24 RX ADMIN — GABAPENTIN 300 MG: 300 CAPSULE ORAL at 20:56

## 2018-08-24 RX ADMIN — ATORVASTATIN CALCIUM 40 MG: 40 TABLET, FILM COATED ORAL at 19:06

## 2018-08-24 RX ADMIN — IOHEXOL 85 ML: 350 INJECTION, SOLUTION INTRAVENOUS at 15:40

## 2018-08-24 RX ADMIN — ACETAMINOPHEN 650 MG: 325 TABLET, FILM COATED ORAL at 20:55

## 2018-08-24 NOTE — H&P
H&P- Kylee Lawson 1980, 34 y o  female MRN: [id number]    Unit/Bed#: ED 26 Encounter: [id number]    Primary Care Provider: Keith Miller MD   Date and time admitted to hospital: 8/24/2018  1:50 PM        Blurry vision, left eye   Assessment & Plan    Possibly secondary to CVA  Continue monitor closely  Do neuro checks  ER did speak to Neurology  They will do consult on Monday unless it is more than per        Dizziness   Assessment & Plan    Could be secondary to CVA  Continue to monitor  Check some orthostatic vital signs  Hypothyroidism   Assessment & Plan    Continue levothyroxine  Check a TSH if not checked recently        Tobacco use   Assessment & Plan    Nicotine patch        * Acute CVA (cerebrovascular accident) [facility])   Assessment & Plan    Patient came in with blurry vision and a little bit of left-sided numbness and tingling  Patient does have a subacute CVA which appears to be occipital   Patient stop taking her aspirin so that will be the 1st thing in will make sure she is on a high-dose statin  Will check an MRI as well  Check an echocardiogram not done in the past 6 months  VTE Prophylaxis: Enoxaparin (Lovenox)  / sequential compression device   Code Status:  Full code  POLST: POLST is not applicable to this patient  Discussion with family:  Patient    Anticipated Length of Stay:  Patient will be admitted on an Inpatient basis with an anticipated length of stay of  greater 2 midnights  Justification for Hospital Stay:  Greater than 2 midnights secondary to having a subacute CVA    Total Time for Visit, including Counseling / Coordination of Care: 1 hour  Greater than 50% of this total time spent on direct patient counseling and coordination of care  Chief Complaint:   Blurry vision in the left eye    History of Present Illness:    Kylee Lawson is a 34 y o  female who presents with blurry vision in left eye    Patient states she had blurry vision all of a sudden the left eye yesterday  She is having difficulty seeing out a left eye  She also has some numbness and tingling along the left side but no weakness  The patient had a little bit of weakness on the left side from previous strokes  The patient states that she gets symptoms like this at least once a week but has not had any blurry vision associated with the numbness and tingling on left side  The patient does have a history of strokes in the past as stated  She has had no other recent illnesses  The patient has not been taking her aspirin for the past several days either  She has had no nausea or vomiting  No fevers or chills  No chest pain or shortness of breath  She has little bit of lightheadedness and dizziness  She is able to see out of her left eye but states it is blurry       Review of Systems:    Review of Systems   Constitutional: Positive for activity change and fatigue  HENT:        Blurry vision   Neurological: Positive for weakness, light-headedness, numbness and headaches  All other systems reviewed and are negative  Past Medical and Surgical History:     Past Medical History:   Diagnosis Date    History of CVA (cerebrovascular accident)     Moyamoya disease     Tobacco use        Past Surgical History:   Procedure Laterality Date    BRAIN SURGERY      BRAIN SURGERY  04/10/2017    moyamoya       Meds/Allergies:    Prior to Admission medications    Medication Sig Start Date End Date Taking?  Authorizing Provider   aspirin 81 mg chewable tablet Chew 4 tablets (324 mg total) daily 7/7/18   Lili Weaver MD   atorvastatin (LIPITOR) 40 mg tablet Take 1 tablet by mouth every evening 10/10/17   NADEEN Olguin   clonazePAM (KlonoPIN) 0 5 mg tablet Take 0 5 mg by mouth 2 (two) times a day    Historical Provider, MD   doxepin (SINEquan) 25 mg capsule Take 1 capsule by mouth daily at bedtime 10/10/17   NADEEN Olguin   gabapentin (NEURONTIN) 300 mg capsule Take 1 capsule by mouth 3 (three) times a day 10/10/17   NADEEN Olguin   levothyroxine 137 mcg tablet Take 1 tablet by mouth daily 7/18/17   Freda Cali MD   nicotine (NICODERM CQ) 14 mg/24hr TD 24 hr patch Place 1 patch on the skin daily 7/18/18   Bryan Bloom MD   PARoxetine (PAXIL) 20 mg tablet Take 1 tablet by mouth daily 10/11/17   NADEEN Olguin   potassium chloride (K-DUR,KLOR-CON) 10 mEq tablet Take 1 tablet (10 mEq total) by mouth 2 (two) times a day 7/17/18   Bryan Bloom MD     I have reviewed home medications with patient personally  Allergies:    Allergies   Allergen Reactions    Penicillin G Hives       Social History:     Marital Status: Single     Patient Pre-hospital Living Situation:  Independent  Patient Pre-hospital Level of Mobility:  Independent  Patient Pre-hospital Diet Restrictions:  None  Substance Use History:   History   Alcohol Use No     History   Smoking Status    Current Some Day Smoker    Packs/day: 0 50    Types: Cigarettes   Smokeless Tobacco    Never Used     History   Drug Use No       Family History:    Family History   Problem Relation Age of Onset    Depression Mother     Heart disease Father     Hypertension Father     Diabetes Father     Breast cancer Maternal Grandmother        Physical Exam:     Vitals:   Blood Pressure: 169/94 (08/24/18 1357)  Pulse: 83 (08/24/18 1450)  Temperature: 97 8 °F (36 6 °C) (08/24/18 1403)  Temp Source: Oral (08/24/18 1403)  Respirations: 16 (08/24/18 1450)  Height: 5' (152 4 cm) (08/24/18 1357)  Weight - Scale: 78 7 kg (173 lb 8 oz) (08/24/18 1357)  SpO2: 94 % (08/24/18 1450)    Physical Exam    (   General Appearance:    Alert, cooperative, no distress, appears stated age   Head:    Normocephalic, without obvious abnormality, atraumatic   Eyes:    PERRL, conjunctiva/corneas clear, EOM's intact,             Nose:   Nares normal, septum midline, mucosa normal   Throat:   Lips, mucosa, and tongue normal; teeth and gums normal   Neck:   Supple, symmetrical, no adenopathy;        thyroid:  No enlargement/tenderness/nodules; no carotid    bruit or JVD   Back:     Symmetric, no curvature, ROM normal, no CVA tenderness   Lungs:     Clear to auscultation bilaterally, respirations unlabored       Heart:    Regular rate and rhythm, S1 and S2 normal, no murmur, rub    or gallop   Abdomen:     Soft, non-tender, bowel sounds active all four quadrants,     no masses, no organomegaly           Extremities:   Extremities normal, atraumatic, no cyanosis or edema   Pulses:   2+ and symmetric all extremities   Skin:   Skin color, texture, turgor normal, no rashes or lesions   Lymph nodes:   Cervical, supraclavicular, and axillary nodes normal   Neurologic:   CNII-XII intact  Normal strength, sensation and reflexes       throughout       Additional Data:     Lab Results: I have personally reviewed pertinent reports  Results from last 7 days  Lab Units 08/24/18  1447 08/24/18  1409   WBC Thousand/uL  --  13 20*   HEMOGLOBIN g/dL  --  11 3*   I STAT HEMOGLOBIN g/dl 10 9*  --    HEMATOCRIT % 32* 36 7   PLATELETS Thousands/uL  --  425*   NEUTROS PCT %  --  67   LYMPHS PCT %  --  27   MONOS PCT %  --  5   EOS PCT %  --  1       Results from last 7 days  Lab Units 08/24/18  1447 08/24/18  1409   SODIUM mmol/L  --  142   POTASSIUM mmol/L  --  3 9   CHLORIDE mmol/L  --  104   CO2 mmol/L  --  26   BUN mg/dL  --  8   CREATININE mg/dL  --  0 73   CALCIUM mg/dL  --  9 2   TOTAL PROTEIN g/dL  --  7 5   BILIRUBIN TOTAL mg/dL  --  0 30   ALK PHOS U/L  --  115   ALT U/L  --  35   AST U/L  --  14   GLUCOSE RANDOM mg/dL  --  139   GLUCOSE, ISTAT mg/dl 106  --        Results from last 7 days  Lab Units 08/24/18  1409   INR  0 99               Imaging: I have personally reviewed pertinent reports        CTA head and neck with and without contrast   Final Result by Krystyna Dupree DO (08/24 1608)      Noncontrast imaging of the brain demonstrates early subacute infarct identified within the left occipital lobe, series 2 image 28  This is new compared to MRI and CT scan of the brain dated 7/16/2018  No hemorrhagic transformation  Stable old infarct within the right parietal and occipital lobe  Stable extensive, chronic moyamoya pattern of vascular enhancement involving the anterior, middle and posterior cerebral artery circulations  Both cervical internal carotid arteries are smoothly narrowed beginning at the superior aspect of the internal carotid artery bulb within the neck and extending to the skull base, right greater than left  This is unchanged, as well  I personally discussed this study with Billy Stein on 8/24/2018 at 3:57 PM                 Workstation performed: [id number]             EKG, Pathology, and Other Studies Reviewed on Admission:   · CT scan report reviewed    Allscripts / Epic Records Reviewed: Yes     ** Please Note: This note has been constructed using a voice recognition system   **

## 2018-08-24 NOTE — PLAN OF CARE
Problem: Potential for Falls  Goal: Patient will remain free of falls  INTERVENTIONS:  - Assess patient frequently for physical needs  -  Identify cognitive and physical deficits and behaviors that affect risk of falls  -  Crum fall precautions as indicated by assessment   - Educate patient/family on patient safety including physical limitations  - Instruct patient to call for assistance with activity based on assessment  - Modify environment to reduce risk of injury  - Consider OT/PT consult to assist with strengthening/mobility   Outcome: Progressing      Problem: Prexisting or High Potential for Compromised Skin Integrity  Goal: Skin integrity is maintained or improved  INTERVENTIONS:  - Identify patients at risk for skin breakdown  - Assess and monitor skin integrity  - Assess and monitor nutrition and hydration status  - Monitor labs (i e  albumin)  - Assess for incontinence   - Turn and reposition patient  - Assist with mobility/ambulation  - Relieve pressure over bony prominences  - Avoid friction and shearing  - Provide appropriate hygiene as needed including keeping skin clean and dry  - Evaluate need for skin moisturizer/barrier cream  - Collaborate with interdisciplinary team (i e  Nutrition, Rehabilitation, etc )   - Patient/family teaching   Outcome: Progressing      Problem: Neurological Deficit  Goal: Neurological status is stable or improving  Interventions:  - Monitor and assess patient's level of consciousness, motor function, sensory function, and level of assistance needed for ADLs  - Monitor and report changes from baseline  Collaborate with interdisciplinary team to initiate plan and implement interventions as ordered  - Provide and maintain a safe environment  - Utilize seizure precautions  - Utilize fall precautions  - Utilize aspiration precautions  - Utilize bleeding precautions  Outcome: Progressing      Problem:  Activity Intolerance/Impaired Mobility  Goal: Mobility/activity is maintained at optimum level for patient  Interventions:  - Assess and monitor patient  barriers to mobility and need for assistive/adaptive devices  - Assess patient's emotional response to limitations  - Collaborate with interdisciplinary team and initiate plans and interventions as ordered  - Encourage independent activity per ability   - Maintain proper body alignment  - Perform active/passive rom as tolerated/ordered  - Plan activities to conserve energy   - Turn patient  Outcome: Progressing      Problem: Communication Impairment  Goal: Ability to express needs and understand communication  Assess patient's communication skills and ability to understand information  Patient will demonstrate use of effective communication techniques, alternative methods of communication and understanding even if not able to speak  - Encourage communication and provide alternate methods of communication as needed  - Collaborate with case management/ for discharge needs  - Include patient/family/caregiver in decisions related to communication  Outcome: Progressing      Problem: Potential for Aspiration  Goal: Non-ventilated patient's risk of aspiration is minimized  Assess and monitor vital signs, respiratory status, and labs (WBC)  Monitor for signs of aspiration (tachypnea, cough, rales, wheezing, cyanosis, fever)  - Assess and monitor patient's ability to swallow  - Place patient up in chair to eat if possible  - HOB up at 90 degrees to eat if unable to get patient up into chair   - Supervise patient during oral intake  - Instruct patient to take small bites  - Instruct patient to take small single sips when taking liquids  - Follow patient-specific strategies generated by speech pathologist   Outcome: Progressing    Goal: Ventilated patient's risk of aspiration is minimized  Assess and monitor vital signs, respiratory status, airway cuff pressure, and labs (WBC)  Monitor for signs of aspiration (tachypnea, cough, rales, wheezing, cyanosis, fever)  - Elevate head of bed 30 degrees if patient has tube feeding   - Monitor tube feeding  Outcome: Progressing      Problem: Nutrition  Goal: Nutrition/Hydration status is improving  Monitor and assess patient's nutrition/hydration status for malnutrition (ex- brittle hair, bruises, dry skin, pale skin and conjunctiva, muscle wasting, smooth red tongue, and disorientation)  Collaborate with interdisciplinary team and initiate plan and interventions as ordered  Monitor patient's weight and dietary intake as ordered or per policy  Utilize nutrition screening tool and intervene per policy  Determine patient's food preferences and provide high-protein, high-caloric foods as appropriate  - Assist patient with eating   - Allow adequate time for meals   - Encourage patient to take dietary supplement as ordered  - Collaborate with clinical nutritionist   - Include patient/family/caregiver in decisions related to nutrition    Outcome: Progressing

## 2018-08-24 NOTE — ASSESSMENT & PLAN NOTE
Patient came in with blurry vision and a little bit of left-sided numbness and tingling  Patient does have a subacute CVA which appears to be occipital   Patient stop taking her aspirin so that will be the 1st thing in will make sure she is on a high-dose statin  Will check an MRI as well  Check an echocardiogram not done in the past 6 months

## 2018-08-24 NOTE — ASSESSMENT & PLAN NOTE
Possibly secondary to CVA  Continue monitor closely  Do neuro checks  ER did speak to Neurology    They will do consult on Monday unless it is more than per

## 2018-08-24 NOTE — ED NOTES
Pt states pain is around 9/10, will check with dr to see if we can give anything      Sukumar Martinez RN  08/24/18 [id number]

## 2018-08-24 NOTE — ED PROVIDER NOTES
History  Chief Complaint   Patient presents with    CVA/TIA-like Symptoms     pt reports left peripheral vision loss  pt has a hx of brain sx as well as a hx of strokes in the past       33 yo M with h/o moyamoya and prior PCA infarct who presents with left sided visual defect in addition to headache and confusion  She reports she woke up this morning at about 7 am and felt confused, like she is thinking slowly  Also that she can't see things on the left side of her face, like her vision is patchy  Also states that the left side of her face feels weak  She states she feels off balance when she walks which is new for her  She does get headaches almost daily and headache feels similar  Prior to Admission Medications   Prescriptions Last Dose Informant Patient Reported? Taking?    PARoxetine (PAXIL) 20 mg tablet   No No   Sig: Take 1 tablet by mouth daily   aspirin 81 mg chewable tablet   No No   Sig: Chew 4 tablets (324 mg total) daily   atorvastatin (LIPITOR) 40 mg tablet   No No   Sig: Take 1 tablet by mouth every evening   clonazePAM (KlonoPIN) 0 5 mg tablet   Yes No   Sig: Take 0 5 mg by mouth 2 (two) times a day   doxepin (SINEquan) 25 mg capsule   No No   Sig: Take 1 capsule by mouth daily at bedtime   gabapentin (NEURONTIN) 300 mg capsule   No No   Sig: Take 1 capsule by mouth 3 (three) times a day   levothyroxine 137 mcg tablet   No No   Sig: Take 1 tablet by mouth daily   nicotine (NICODERM CQ) 14 mg/24hr TD 24 hr patch   No No   Sig: Place 1 patch on the skin daily   potassium chloride (K-DUR,KLOR-CON) 10 mEq tablet   No No   Sig: Take 1 tablet (10 mEq total) by mouth 2 (two) times a day      Facility-Administered Medications: None       Past Medical History:   Diagnosis Date    History of CVA (cerebrovascular accident)     Moyamoya disease     Tobacco use        Past Surgical History:   Procedure Laterality Date    BRAIN SURGERY      BRAIN SURGERY  04/10/2017    moyamoya       Family History   Problem Relation Age of Onset    Depression Mother     Heart disease Father     Hypertension Father     Diabetes Father     Breast cancer Maternal Grandmother      I have reviewed and agree with the history as documented  Social History   Substance Use Topics    Smoking status: Current Some Day Smoker     Packs/day: 0 50     Types: Cigarettes    Smokeless tobacco: Never Used    Alcohol use No        Review of Systems   Constitutional: Negative for chills and fever  HENT: Negative for dental problem and ear pain  Eyes: Positive for visual disturbance  Negative for pain and redness  Respiratory: Negative for cough and shortness of breath  Cardiovascular: Negative for chest pain and palpitations  Gastrointestinal: Negative for abdominal pain and nausea  Endocrine: Negative for polydipsia and polyphagia  Genitourinary: Negative for dysuria and frequency  Musculoskeletal: Negative for arthralgias and joint swelling  Skin: Negative for color change and rash  Neurological: Positive for weakness and headaches  Negative for dizziness  Psychiatric/Behavioral: Negative for behavioral problems and confusion  All other systems reviewed and are negative  Physical Exam  Physical Exam   Constitutional: She is oriented to person, place, and time  She appears well-developed and well-nourished  No distress  Poor hygiene   HENT:   Head: Atraumatic  Right Ear: External ear normal    Left Ear: External ear normal    Nose: Nose normal    Eyes: Conjunctivae and EOM are normal  Pupils are equal, round, and reactive to light  Neck: Normal range of motion  Neck supple  No JVD present  Cardiovascular: Normal rate, regular rhythm and normal heart sounds  No murmur heard  Pulmonary/Chest: Effort normal and breath sounds normal  No respiratory distress  She has no wheezes  Abdominal: Soft  Bowel sounds are normal  She exhibits no distension  There is no tenderness  Musculoskeletal: Normal range of motion  She exhibits no edema  Neurological: She is alert and oriented to person, place, and time  CN II-XII intact except for left upper quadrant visual field deficit and mild left nasolabial fold flattening  Normal strength and sensation in b/l upper and lower extremities  Normal FNF and rapid alternating hand movements   Skin: Skin is warm and dry  Capillary refill takes less than 2 seconds  She is not diaphoretic  Psychiatric: She has a normal mood and affect  Her behavior is normal    Nursing note and vitals reviewed        Vital Signs  ED Triage Vitals   Temperature Pulse Respirations Blood Pressure SpO2   08/24/18 1403 08/24/18 1357 08/24/18 1357 08/24/18 1357 08/24/18 1357   97 8 °F (36 6 °C) 103 16 169/94 96 %      Temp Source Heart Rate Source Patient Position - Orthostatic VS BP Location FiO2 (%)   08/24/18 1403 08/24/18 1357 08/24/18 1357 08/24/18 1357 --   Oral Monitor Lying Right arm       Pain Score       08/24/18 1357       No Pain           Vitals:    08/24/18 1357 08/24/18 1450   BP: 169/94    Pulse: 103 83   Patient Position - Orthostatic VS: Lying        Visual Acuity      ED Medications  Medications   nicotine (NICODERM CQ) 21 mg/24 hr TD 24 hr patch 21 mg (not administered)   iohexol (OMNIPAQUE) 350 MG/ML injection (MULTI-DOSE) 85 mL (85 mL Intravenous Given 8/24/18 1540)       Diagnostic Studies  Results Reviewed     Procedure Component Value Units Date/Time    POCT Chem 8+ [[id number]]  (Abnormal) Collected:  08/24/18 1447    Lab Status:  Final result Updated:  08/24/18 1452     SODIUM, I-STAT 139 mmol/l      Potassium, i-STAT 3 7 mmol/L      Chloride, istat 103 mmol/L      CO2, i-STAT 25 mmol/L      Anion Gap, Istat 16 (H) mmol/L      Calcium, Ionized i-STAT 1 10 (L) mmol/L      BUN, I-STAT 7 mg/dl      Creatinine, i-STAT 0 6 mg/dl      eGFR 114 ml/min/1 73sq m      Glucose, i-STAT 106 mg/dl      Hct, i-STAT 32 (L) %      Hgb, i-STAT 10 9 (L) g/dl Specimen Type VENOUS    hCG, qualitative pregnancy [[id number]]  (Normal) Collected:  08/24/18 1409    Lab Status:  Final result Specimen:  Blood from Arm, Left Updated:  08/24/18 1438     Preg, Serum Negative    Troponin I [[id number]]  (Normal) Collected:  08/24/18 1409    Lab Status:  Final result Specimen:  Blood from Arm, Left Updated:  08/24/18 1434     Troponin I <0 02 ng/mL     Comprehensive metabolic panel [[id number]] Collected:  08/24/18 1409    Lab Status:  Final result Specimen:  Blood from Arm, Left Updated:  08/24/18 1433     Sodium 142 mmol/L      Potassium 3 9 mmol/L      Chloride 104 mmol/L      CO2 26 mmol/L      Anion Gap 12 mmol/L      BUN 8 mg/dL      Creatinine 0 73 mg/dL      Glucose 139 mg/dL      Calcium 9 2 mg/dL      AST 14 U/L      ALT 35 U/L      Alkaline Phosphatase 115 U/L      Total Protein 7 5 g/dL      Albumin 3 7 g/dL      Total Bilirubin 0 30 mg/dL      eGFR 103 ml/min/1 73sq m     Narrative:         National Kidney Disease Education Program recommendations are as follows:  GFR calculation is accurate only with a steady state creatinine  Chronic Kidney disease less than 60 ml/min/1 73 sq  meters  Kidney failure less than 15 ml/min/1 73 sq  meters      Protime-INR [[id number]]  (Normal) Collected:  08/24/18 1409    Lab Status:  Final result Specimen:  Blood from Arm, Left Updated:  08/24/18 1428     Protime 13 0 seconds      INR 0 99    APTT [[id number]]  (Normal) Collected:  08/24/18 1409    Lab Status:  Final result Specimen:  Blood from Arm, Left Updated:  08/24/18 1428     PTT 28 seconds     CBC and differential [[id number]]  (Abnormal) Collected:  08/24/18 1409    Lab Status:  Final result Specimen:  Blood from Arm, Left Updated:  08/24/18 1419     WBC 13 20 (H) Thousand/uL      RBC 4 80 Million/uL      Hemoglobin 11 3 (L) g/dL      Hematocrit 36 7 %      MCV 77 (L) fL      MCH 23 5 (L) pg      MCHC 30 8 (L) g/dL      RDW 16 3 (H) %      MPV 10 2 fL      Platelets 774 (H) Thousands/uL nRBC 0 /100 WBCs      Neutrophils Relative 67 %      Immat GRANS % 0 %      Lymphocytes Relative 27 %      Monocytes Relative 5 %      Eosinophils Relative 1 %      Basophils Relative 0 %      Neutrophils Absolute 8 88 (H) Thousands/µL      Immature Grans Absolute 0 03 Thousand/uL      Lymphocytes Absolute 3 51 Thousands/µL      Monocytes Absolute 0 62 Thousand/µL      Eosinophils Absolute 0 12 Thousand/µL      Basophils Absolute 0 04 Thousands/µL                  CTA head and neck with and without contrast   Final Result by Krystle Hudson DO (08/24 9634)      Noncontrast imaging of the brain demonstrates early subacute infarct identified within the left occipital lobe, series 2 image 28  This is new compared to MRI and CT scan of the brain dated 7/16/2018  No hemorrhagic transformation  Stable old infarct within the right parietal and occipital lobe  Stable extensive, chronic moyamoya pattern of vascular enhancement involving the anterior, middle and posterior cerebral artery circulations  Both cervical internal carotid arteries are smoothly narrowed beginning at the superior aspect of the internal carotid artery bulb within the neck and extending to the skull base, right greater than left  This is unchanged, as well         I personally discussed this study with Jhonatan Hernández on 8/24/2018 at 3:57 PM                 Workstation performed: [id number]                    Procedures  ECG 12 Lead Documentation  Date/Time: 8/24/2018 2:26 PM  Performed by: Maisha Figueroa by: Mary Drake     Indications / Diagnosis:  HA  ECG reviewed by me, the ED Provider: yes    Comments:      NSR rate of 99, normal axis and intervals, no ST elevations or depressions           Phone Contacts  ED Phone Contact    ED Course                               MDM  Number of Diagnoses or Management Options  CVA (cerebral vascular accident) [facility]):   Visual changes:   Diagnosis management comments: 78-year-old female with past medical history of moyamoya and prior PCA infarct presents with left-sided visual field deficit, headache, subjective facial weakness  She is out of the window for tPA and given her moyamoya unable to intervene with IR  Patient will be admitted to inpatient hospitalist service for further management  The patient presented with a condition in which there was a high probability of imminent or life-threatening deterioration, and critical care services (excluding separately billable procedures) totalled 30-74 minutes (care for acute stroke)  Disposition  Final diagnoses:   CVA (cerebral vascular accident) ([address] )   Visual changes     Time reflects when diagnosis was documented in both MDM as applicable and the Disposition within this note     Time User Action Codes Description Comment    8/24/2018  4:44 PM Lopez Remedies Add [I63 9] CVA (cerebral vascular accident) ([address] )     8/24/2018  4:44 PM Lopez Remedies Add [H53 9] Visual changes       ED Disposition     ED Disposition Condition Comment    Admit  Case was discussed with Dr Nanci Stephen and the patient's admission status was agreed to be Admission Status: inpatient status to the service of Dr Sanjuana Krabbe   Follow-up Information    None         Patient's Medications   Discharge Prescriptions    No medications on file     No discharge procedures on file      ED Provider  Electronically Signed by           Magdalene Vance MD  08/24/18 1994

## 2018-08-25 LAB
ANION GAP SERPL CALCULATED.3IONS-SCNC: 10 MMOL/L (ref 4–13)
BUN SERPL-MCNC: 7 MG/DL (ref 5–25)
CALCIUM SERPL-MCNC: 8.9 MG/DL (ref 8.3–10.1)
CHLORIDE SERPL-SCNC: 104 MMOL/L (ref 100–108)
CHOLEST SERPL-MCNC: 206 MG/DL (ref 50–200)
CO2 SERPL-SCNC: 24 MMOL/L (ref 21–32)
CREAT SERPL-MCNC: 0.7 MG/DL (ref 0.6–1.3)
EST. AVERAGE GLUCOSE BLD GHB EST-MCNC: 114 MG/DL
GFR SERPL CREATININE-BSD FRML MDRD: 109 ML/MIN/1.73SQ M
GLUCOSE SERPL-MCNC: 118 MG/DL (ref 65–140)
HBA1C MFR BLD: 5.6 % (ref 4.2–6.3)
HDLC SERPL-MCNC: 38 MG/DL (ref 40–60)
LDLC SERPL CALC-MCNC: 147 MG/DL (ref 0–100)
MAGNESIUM SERPL-MCNC: 2.1 MG/DL (ref 1.6–2.6)
PHOSPHATE SERPL-MCNC: 5.2 MG/DL (ref 2.7–4.5)
POTASSIUM SERPL-SCNC: 4 MMOL/L (ref 3.5–5.3)
SODIUM SERPL-SCNC: 138 MMOL/L (ref 136–145)
TRIGL SERPL-MCNC: 103 MG/DL
TSH SERPL DL<=0.05 MIU/L-ACNC: 2.7 UIU/ML (ref 0.36–3.74)

## 2018-08-25 PROCEDURE — G8979 MOBILITY GOAL STATUS: HCPCS

## 2018-08-25 PROCEDURE — 83036 HEMOGLOBIN GLYCOSYLATED A1C: CPT | Performed by: FAMILY MEDICINE

## 2018-08-25 PROCEDURE — 92610 EVALUATE SWALLOWING FUNCTION: CPT

## 2018-08-25 PROCEDURE — 97163 PT EVAL HIGH COMPLEX 45 MIN: CPT

## 2018-08-25 PROCEDURE — 80048 BASIC METABOLIC PNL TOTAL CA: CPT | Performed by: FAMILY MEDICINE

## 2018-08-25 PROCEDURE — G8978 MOBILITY CURRENT STATUS: HCPCS

## 2018-08-25 PROCEDURE — 80061 LIPID PANEL: CPT | Performed by: FAMILY MEDICINE

## 2018-08-25 PROCEDURE — 84443 ASSAY THYROID STIM HORMONE: CPT | Performed by: FAMILY MEDICINE

## 2018-08-25 PROCEDURE — G8997 SWALLOW GOAL STATUS: HCPCS

## 2018-08-25 PROCEDURE — 97116 GAIT TRAINING THERAPY: CPT

## 2018-08-25 PROCEDURE — 99232 SBSQ HOSP IP/OBS MODERATE 35: CPT | Performed by: INTERNAL MEDICINE

## 2018-08-25 PROCEDURE — 83735 ASSAY OF MAGNESIUM: CPT | Performed by: FAMILY MEDICINE

## 2018-08-25 PROCEDURE — G8998 SWALLOW D/C STATUS: HCPCS

## 2018-08-25 PROCEDURE — G8996 SWALLOW CURRENT STATUS: HCPCS

## 2018-08-25 PROCEDURE — 84100 ASSAY OF PHOSPHORUS: CPT | Performed by: FAMILY MEDICINE

## 2018-08-25 RX ORDER — NICOTINE 21 MG/24HR
21 PATCH, TRANSDERMAL 24 HOURS TRANSDERMAL ONCE
Status: COMPLETED | OUTPATIENT
Start: 2018-08-25 | End: 2018-08-26

## 2018-08-25 RX ORDER — ASPIRIN 325 MG
325 TABLET ORAL DAILY
Status: DISCONTINUED | OUTPATIENT
Start: 2018-08-25 | End: 2018-08-27 | Stop reason: HOSPADM

## 2018-08-25 RX ADMIN — ASPIRIN 325 MG: 325 TABLET ORAL at 11:53

## 2018-08-25 RX ADMIN — NICOTINE 21 MG: 21 PATCH TRANSDERMAL at 18:05

## 2018-08-25 RX ADMIN — GABAPENTIN 300 MG: 300 CAPSULE ORAL at 17:26

## 2018-08-25 RX ADMIN — CLOPIDOGREL BISULFATE 75 MG: 75 TABLET ORAL at 09:21

## 2018-08-25 RX ADMIN — ENOXAPARIN SODIUM 40 MG: 40 INJECTION SUBCUTANEOUS at 09:21

## 2018-08-25 RX ADMIN — ATORVASTATIN CALCIUM 40 MG: 40 TABLET, FILM COATED ORAL at 18:05

## 2018-08-25 RX ADMIN — PAROXETINE HYDROCHLORIDE 20 MG: 20 TABLET, FILM COATED ORAL at 09:21

## 2018-08-25 RX ADMIN — DOXEPIN HYDROCHLORIDE 25 MG: 25 CAPSULE ORAL at 21:58

## 2018-08-25 RX ADMIN — GABAPENTIN 300 MG: 300 CAPSULE ORAL at 09:21

## 2018-08-25 RX ADMIN — GABAPENTIN 300 MG: 300 CAPSULE ORAL at 20:03

## 2018-08-25 RX ADMIN — POTASSIUM CHLORIDE 10 MEQ: 750 TABLET, EXTENDED RELEASE ORAL at 09:21

## 2018-08-25 RX ADMIN — POTASSIUM CHLORIDE 10 MEQ: 750 TABLET, EXTENDED RELEASE ORAL at 17:26

## 2018-08-25 RX ADMIN — CLONAZEPAM 0.5 MG: 0.5 TABLET ORAL at 17:26

## 2018-08-25 RX ADMIN — CLONAZEPAM 0.5 MG: 0.5 TABLET ORAL at 09:22

## 2018-08-25 RX ADMIN — LEVOTHYROXINE SODIUM 137 MCG: 112 TABLET ORAL at 09:21
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
## 2018-08-25 NOTE — SOCIAL WORK
Per MD pt resides in a tent  Non compliant w meds  However, has ins  Cm to follow up   Consult received

## 2018-08-25 NOTE — CASE MANAGEMENT
Initial Clinical Review    Admission: Date/Time/Statement: 8/24/18 @ 1643     Orders Placed This Encounter   Procedures    Inpatient Admission (expected length of stay for this patient is greater than two midnights)     Standing Status:   Standing     Number of Occurrences:   1     Order Specific Question:   Admitting Physician     Answer:   Yanet King     Order Specific Question:   Level of Care     Answer:   Med Surg [16]     Order Specific Question:   Estimated length of stay     Answer:   More than 2 Midnights     Order Specific Question:   Certification     Answer:   I certify that inpatient services are medically necessary for this patient for a duration of greater than two midnights  See H&P and MD Progress Notes for additional information about the patient's course of treatment  ED: Date/Time/Mode of Arrival:   ED Arrival Information     Expected Arrival Acuity Means of Arrival Escorted By Service Admission Type    - 8/24/2018 13:36 Emergent Walk-In Family Member General Medicine Emergency    Arrival Complaint    HEADACHE          Chief Complaint:   Chief Complaint   Patient presents with    CVA/TIA-like Symptoms     pt reports left peripheral vision loss  pt has a hx of brain sx as well as a hx of strokes in the past         History of Illness: 34 y o  female who presents with blurry vision in left eye  Patient states she had blurry vision all of a sudden the left eye yesterday  She is having difficulty seeing out of left eye  She also has some numbness and tingling along the left side but no weakness  The patient had a little bit of weakness on the left side from previous strokes  The patient states that she gets symptoms like this at least once a week but has not had any blurry vision associated with the numbness and tingling on left side  The patient does have a history of strokes in the past as stated  She has had no other recent illnesses    The patient has not been taking her aspirin for the past several days either  ED Vital Signs:   ED Triage Vitals   Temperature Pulse Respirations Blood Pressure SpO2   08/24/18 1403 08/24/18 1357 08/24/18 1357 08/24/18 1357 08/24/18 1357   97 8 °F (36 6 °C) 103 16 169/94 96 %      Temp Source Heart Rate Source Patient Position - Orthostatic VS BP Location FiO2 (%)   08/24/18 1403 08/24/18 1357 08/24/18 1357 08/24/18 1357 --   Oral Monitor Lying Right arm       Pain Score       08/24/18 1357       No Pain        Wt Readings from Last 1 Encounters:   08/24/18 78 kg (171 lb 15 3 oz)       Vital Signs (abnormal): WNL    Abnormal Labs:    08/25/18 0525    Phosphorus 2 7 - 4 5 mg/dL 5 2       08/24/18 1409     WBC 4 31 - 10 16 Thousand/uL 13 20     RBC 3 81 - 5 12 Million/uL 4 80    Hemoglobin 11 5 - 15 4 g/dL 11 3     Hematocrit 34 8 - 46 1 % 36 7    MCV 82 - 98 fL 77     MCH 26 8 - 34 3 pg 23 5     MCHC 31 4 - 37 4 g/dL 30 8     RDW 11 6 - 15 1 % 16 3     MPV 8 9 - 12 7 fL 10 2    Platelets 351 - 545 Thousands/uL 425         Diagnostic Test Results: MRI Brain - Acute to subacute left PCA infarct  Foci of left PCA/MCA watershed distribution infarct  Old right PCA distribution infarct  CTA Head and Neck -  Stable old infarct within the right parietal and occipital lobe  Stable extensive, chronic moyamoya pattern of vascular enhancement involving the anterior, middle and posterior cerebral artery circulations  Both cervical internal carotid arteries are smoothly narrowed beginning at the superior aspect of the internal carotid artery bulb within the neck and extending to the skull base, right greater than left  This is unchanged, as well           ED Treatment:   Medication Administration from 08/24/2018 1336 to 08/24/2018 1740       Date/Time Order Dose Route Action     08/24/2018 1540 iohexol (OMNIPAQUE) 350 MG/ML injection (MULTI-DOSE) 85 mL 85 mL Intravenous Given     08/24/2018 1720 nicotine (NICODERM CQ) 21 mg/24 hr TD 24 hr patch 21 mg 21 mg Transdermal Medication Applied          Past Medical/Surgical History: Active Ambulatory Problems     Diagnosis Date Noted    History of CVA (cerebrovascular accident) 10/07/2017    Tobacco use 10/07/2017    Moyamoya disease 10/07/2017    Leukocytosis 10/07/2017    Hypothyroidism 10/09/2017    Psychosocial problem 10/09/2017    Homeless 06/22/2018    Dizziness 07/16/2018    Miscarriage within last 12 months 07/16/2018    Syncope 07/16/2018     Resolved Ambulatory Problems     Diagnosis Date Noted    Left sided numbness 10/07/2017     Past Medical History:   Diagnosis Date    Disease of thyroid gland     History of CVA (cerebrovascular accident)     Hypertension     Moyamoya disease     Psychiatric disorder     Stroke ([facility] [address] )     Tobacco use        Admitting Diagnosis: [facility]cerebral vascular accident) ([facility] ) [I63 9]  Visual changes [H53 9]  Headache [R51]  Acute CVA (cerebrovascular accident) ([facility] ) [I63 9]    Age/Sex: 34 y o  female    Assessment/Plan:   Assessment & Plan     Possibly secondary to CVA  Continue monitor closely  Do neuro checks  ER did speak to Neurology  They will do consult on Monday unless it is more than per          Dizziness   Assessment & Plan     Could be secondary to CVA  Continue to monitor  Check some orthostatic vital signs           Hypothyroidism   Assessment & Plan     Continue levothyroxine  Check a TSH if not checked recently          Tobacco use   Assessment & Plan     Nicotine patch          * Acute CVA (cerebrovascular accident) [facility])   Assessment & Plan     Patient came in with blurry vision and a little bit of left-sided numbness and tingling  Patient does have a subacute CVA which appears to be occipital   Patient stop taking her aspirin so that will be the 1st thing in will make sure she is on a high-dose statin  Will check an MRI as well    Check an echocardiogram not done in the past 6 months                  VTE Prophylaxis: Enoxaparin (Lovenox) / sequential compression device   Code Status:  Full code  POLST: POLST is not applicable to this patient  Discussion with family:  Patient     Anticipated Length of Stay:  Patient will be admitted on an Inpatient basis with an anticipated length of stay of  greater 2 midnights  Justification for Hospital Stay:  Greater than 2 midnights secondary to having a subacute CVA       Admission Orders:  Cardiac Step 1 diet  Tele monitoring  Neurological checks Every 1 hour x 4 hours, then every 2 hours x 4, then every 4 hours x 72 hours  Neurology cons  PT/OT eval and treat    Scheduled Meds:   Current Facility-Administered Medications:  aspirin 325 mg Oral Daily   atorvastatin 40 mg Oral QPM   clonazePAM 0 5 mg Oral BID   clopidogrel 75 mg Oral Daily   doxepin 25 mg Oral HS   enoxaparin 40 mg Subcutaneous Q24H URBANO   gabapentin 300 mg Oral TID   levothyroxine 137 mcg Oral Daily   nicotine 21 mg Transdermal Once   PARoxetine 20 mg Oral Daily   potassium chloride 10 mEq Oral BID     Continuous Infusions:    PRN Meds:   acetaminophen    ondansetron    ---------------------------------------------------------------------------------------------------------------------    8/25 Physician progress notes:  Assessment:     Principal Problem:    Acute CVA (cerebrovascular accident) ([address] )  Active Problems:    Tobacco use    Hypothyroidism    Dizziness    Blurry vision, left eye        Plan:  Acute to subacute left PCA infarct, known history of moyamoya syndrome  MRI of the brain did show acute to subacute left PCA infarct  Foci of left PCA/MCA watershed distribution infarct  Old right PCA distribution infarct also seen  Patient was noncompliant with her aspirin 325 mg prior to admission  Discuss management plan with Dr Magali Orr of Neurology  Recommended to continue aspirin 325 mg, Plavix 75 mg and statin    Formal neurology consult on Monday  Continue to monitor clinically  PT/OT     History of noncompliance  Stressed on importance of taking medications     Hypothyroidism  Continue levothyroxine     Nicotine dependence  Counseled on smoking cessation     VTE Pharmacologic Prophylaxis:   Pharmacologic: Enoxaparin (Lovenox)  Mechanical VTE Prophylaxis in Place:  Yes     Current Length of Stay: 1 day(s)     Current Patient Status: Inpatient   Certification Statement: The patient will continue to require additional inpatient hospital stay due to Stroke
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
## 2018-08-25 NOTE — PROGRESS NOTES
Notified by MRI of new findings showing acute to subacute left PCA infarct  CTA of head and neck demonstrates early subacute infarct identified within the left occipital lobe  Discussed patient with Dr Melissa Calvert from Neurology and notified him of new findings  It was suggested that patient be switched from 03/25 aspirin to Plavix  Order placed

## 2018-08-25 NOTE — PHYSICAL THERAPY NOTE
PT Progress Note (15min)  (8:45-9:00)       08/25/18 0900   Pain Assessment   Pain Assessment No/denies pain   Restrictions/Precautions   Other Precautions Chair Alarm; Bed Alarm;Telemetry; Fall Risk;Visual impairment   General   Chart Reviewed Yes   Cognition   Orientation Level Oriented X4   Subjective   Subjective pt in bathroom upon arrival  agreeable to PT session  "I'd like to walk s the walker"  Transfers   Sit to Stand 5  Supervision   Additional items Verbal cues; Increased time required   Stand to Sit 5  Supervision   Additional items Verbal cues; Increased time required   Toilet transfer 5  Supervision   Additional items Standard toilet;Verbal cues; Increased time required  (c grab bar)   Ambulation/Elevation   Gait pattern Narrow SYLVIA; Decreased foot clearance; Inconsistent agueda  (lack of arm swing)   Gait Assistance 5  Supervision   Additional items Verbal cues  (2* impaired L eye vision)   Assistive Device None   Distance 150' + 50' c standing rest   Balance   Static Sitting Fair +   Dynamic Sitting Fair +   Static Standing Fair   Dynamic Standing Fair   Ambulatory Fair   Activity Tolerance   Activity Tolerance Patient limited by fatigue   Nurse Made Aware Ashu   Exercises   Heelslides Sitting;15 reps;AROM; Bilateral   Hip Abduction Sitting;15 reps;AROM; Bilateral   Knee AROM Long Arc Quad Sitting;15 reps;AROM; Bilateral   Ankle Pumps Sitting;15 reps;AROM; Bilateral   Marching Sitting;15 reps;AROM; Bilateral   Assessment   Prognosis Good   Problem List Decreased strength;Decreased endurance; Impaired balance;Decreased mobility; Impaired vision   Assessment pt demonstrating progress c PT  performed all phases of mobility c (S)  progressed ambulation distance to 150' + 50' s AD c standing rest  cont to require min verbal cues 2* impaired L visual field  initiated B/L LE ther ex in sitting x15 reps c AROM  remained seated at EOB at end of session   would benefit from cont skilled PT to further maximize functional mobility + decrease fall risk  upon d/c, recommend pt d/c to most appropriate location once medically stable  Barriers to Discharge Other (Comment)  (pt homeless)   Goals   Patient Goals "I want to be able to walk s falling "   STG Expiration Date 09/01/18   Treatment Day 1   Plan   Treatment/Interventions Functional transfer training;LE strengthening/ROM; Elevations; Therapeutic exercise; Endurance training;Patient/family training;Bed mobility;Gait training;Equipment eval/education;Spoke to nursing;Spoke to MD   Progress Progressing toward goals   PT Frequency 2-3x/wk   Recommendation   Recommendation Other (Comment)  (d/c to most appropriate location once stable)   PT - OK to Discharge Yes     Arabella Epps, PT

## 2018-08-25 NOTE — PLAN OF CARE
Activity Intolerance/Impaired Mobility     Mobility/activity is maintained at optimum level for patient Not Progressing        Communication Impairment     Ability to express needs and understand communication Not [place]     Discharge to home or other facility with appropriate resources Not Progressing        INFECTION - ADULT     Absence or prevention of progression during hospitalization Not Progressing        Knowledge Deficit     Patient/family/caregiver demonstrates understanding of disease process, treatment plan, medications, and discharge instructions Not Progressing        Neurological Deficit     Neurological status is stable or improving Not Progressing        Nutrition     Nutrition/Hydration status is improving Not Progressing        PAIN - ADULT     Verbalizes/displays adequate comfort level or baseline comfort level Not Progressing        Potential for Aspiration     Non-ventilated patient's risk of aspiration is minimized Not Progressing     Ventilated patient's risk of aspiration is minimized Not Progressing        Potential for Falls     Patient will remain free of falls Not Progressing        Prexisting or High Potential for Compromised Skin Integrity     Skin integrity is maintained or improved Not Progressing        SAFETY ADULT     Maintain or return to baseline ADL function Not Progressing     Maintain or return mobility status to optimal level Not Progressing     Patient will remain free of falls Not Progressing

## 2018-08-25 NOTE — PROGRESS NOTES
[facility] Internal Medicine Progress Note  Patient: Abner Ascencio 34 y o  female   MRN: [id number]  PCP: Mendez Browning MD  Unit/Bed#: -01 Encounter: [id number]  Date Of Visit: 08/25/18    Assessment:    Principal Problem:    Acute CVA (cerebrovascular accident) [facility])  Active Problems:    Tobacco use    Hypothyroidism    Dizziness    Blurry vision, left eye      Plan:  Acute to subacute left PCA infarct, known history of moyamoya syndrome  MRI of the brain did show acute to subacute left PCA infarct  Foci of left PCA/MCA watershed distribution infarct  Old right PCA distribution infarct also seen  Patient was noncompliant with her aspirin 325 mg prior to admission  Discuss management plan with Dr [facility] of Neurology  Recommended to continue aspirin 325 mg, Plavix 75 mg and statin  Formal neurology consult on Monday  Continue to monitor clinically  PT/OT    History of noncompliance  Stressed on importance of taking medications    Hypothyroidism  Continue levothyroxine    Nicotine dependence  Counseled on smoking cessation    VTE Pharmacologic Prophylaxis:   Pharmacologic: Enoxaparin (Lovenox)  Mechanical VTE Prophylaxis in Place: Yes    Patient Centered Rounds: I have performed bedside rounds with nursing staff today  Discussions with Specialists or Other Care Team Provider:  Neurology    Education and Discussions with Family / Patient:  Patient    Time Spent for Care: 30 minutes  More than 50% of total time spent on counseling and coordination of care as described above  Current Length of Stay: 1 day(s)    Current Patient Status: Inpatient   Certification Statement: The patient will continue to require additional inpatient hospital stay due to Stroke    Discharge Plan:  Continue PT/OT, Neurology consult pending    Code Status: Level 1 - Full Code      Subjective:   Patient seen and examined    Does admits to have blurred vision in the eye otherwise no complaints at this time    Objective: Vitals:   Temp (24hrs), Av °F (36 7 °C), Min:97 6 °F (36 4 °C), Max:98 3 °F (36 8 °C)    HR:  [] 61  Resp:  [16-18] 18  BP: (104-170)/(54-94) 117/68  SpO2:  [94 %-100 %] 96 %  Body mass index is 33 58 kg/m²  Input and Output Summary (last 24 hours): Intake/Output Summary (Last 24 hours) at 18 1210  Last data filed at 18 1900   Gross per 24 hour   Intake              240 ml   Output                0 ml   Net              240 ml       Physical Exam:     Alert, oriented x3  Mucous membranes are moist  Lungs are clear to auscultation  Heart sounds are regular S1-S2  Abdomen soft nontender  Extremities no edema  Neurological no gross motor or sensory deficits seen  Left-sided vision on the temporal side decreased    Additional Data:     Labs:      Results from last 7 days  Lab Units 18  1447 18  1409   WBC Thousand/uL  --  13 20*   HEMOGLOBIN g/dL  --  11 3*   I STAT HEMOGLOBIN g/dl 10 9*  --    HEMATOCRIT % 32* 36 7   PLATELETS Thousands/uL  --  425*   NEUTROS PCT %  --  67   LYMPHS PCT %  --  27   MONOS PCT %  --  5   EOS PCT %  --  1       Results from last 7 days  Lab Units 18  0525  18  1409   SODIUM mmol/L 138  --  142   POTASSIUM mmol/L 4 0  --  3 9   CHLORIDE mmol/L 104  --  104   CO2 mmol/L 24  --  26   BUN mg/dL 7  --  8   CREATININE mg/dL 0 70  --  0 73   CALCIUM mg/dL 8 9  --  9 2   TOTAL PROTEIN g/dL  --   --  7 5   BILIRUBIN TOTAL mg/dL  --   --  0 30   ALK PHOS U/L  --   --  115   ALT U/L  --   --  35   AST U/L  --   --  14   GLUCOSE RANDOM mg/dL 118  --  139   GLUCOSE, ISTAT   --   < >  --    < > = values in this interval not displayed  Results from last 7 days  Lab Units 18  1409   INR  0 99       * I Have Reviewed All Lab Data Listed Above  * Additional Pertinent Lab Tests Reviewed:  All Labs For Current Hospital Admission Reviewed    Imaging:    Imaging Reports Reviewed Today Include:  MRI of brain  Imaging Personally Reviewed by Myself Includes:  MRI of the brain    Recent Cultures (last 7 days):           Last 24 Hours Medication List:     Current Facility-Administered Medications:  acetaminophen 650 mg Oral Q4H PRN Randall Brown MD   aspirin 325 mg Oral Daily Kemi Osman MD   atorvastatin 40 mg Oral QPM Randall Brown MD   clonazePAM 0 5 mg Oral BID Randall Brown MD   clopidogrel 75 mg Oral Daily Marcene Carrel, CRNP   doxepin 25 mg Oral HS Randall Brown MD   enoxaparin 40 mg Subcutaneous Q24H [address] Randall Brown MD   gabapentin 300 mg Oral TID Randall Brown MD   levothyroxine 137 mcg Oral Daily Randall Brown MD   nicotine 21 mg Transdermal Once Diamond Mahajan MD   ondansetron 4 mg Intravenous Q6H PRN Randall Brown MD   PARoxetine 20 mg Oral Daily Randall Brown MD   potassium chloride 10 mEq Oral BID Randall Brown MD        Today, Patient Was Seen By: Kemi Osman MD    ** Please Note: Dragon 360 Dictation voice to text software may have been used in the creation of this document   **

## 2018-08-25 NOTE — PLAN OF CARE
Problem: PHYSICAL THERAPY ADULT  Goal: Performs mobility at highest level of function for planned discharge setting  See evaluation for individualized goals  Treatment/Interventions: Functional transfer training, LE strengthening/ROM, Therapeutic exercise, Elevations, Endurance training, Patient/family training, Equipment eval/education, Bed mobility, Gait training, Spoke to nursing, Spoke to MD          See flowsheet documentation for full assessment, interventions and recommendations  Outcome: Progressing  Prognosis: Good  Problem List: Decreased strength, Decreased endurance, Impaired balance, Decreased mobility, Impaired vision  Assessment: pt demonstrating progress c PT  performed all phases of mobility c (S)  progressed ambulation distance to 150' + 50' s AD c standing rest  cont to require min verbal cues 2* impaired L visual field  initiated B/L LE ther ex in sitting x15 reps c AROM  remained seated at EOB at end of session  would benefit from cont skilled PT to further maximize functional mobility + decrease fall risk  upon d/c, recommend pt d/c to most appropriate location once medically stable  Barriers to Discharge: Other (Comment) (pt homeless)     Recommendation: Other (Comment) (d/c to most appropriate location once stable)     PT - OK to Discharge: Yes    See flowsheet documentation for full assessment

## 2018-08-25 NOTE — SPEECH THERAPY NOTE
Speech-Language Pathology Bedside Swallow Evaluation        Patient Name: Albert Rees    AXEPG'Y Date: 8/25/2018     Problem List  Patient Active Problem List   Diagnosis    History of CVA (cerebrovascular accident)    Tobacco use    Moyamoya disease    Leukocytosis    Hypothyroidism    Psychosocial problem    Homeless    Dizziness    Miscarriage within last 12 months    Syncope    Acute CVA [facility]cerebro[facility] accident) ([facility] [address] [facility]    Blurry vision, left eye       Past Medical History  Past Medical History:   Diagnosis Date    Disease of thyroid gland     History of CVA (cerebrovascular accident)     Hypertension     Moyamoya disease     Psychiatric disorder     Stroke ([facility] )     Tobacco use        Past Surgical History  Past Surgical History:   Procedure Laterality Date    BRAIN SURGERY      BRAIN SURGERY  04/10/2017    moyamoya         Current Medical Status  Pt is a 34 y o  female who presented to [facility] with blurry vision in left eye  Patient states she had blurry vision all of a sudden the left eye yesterday  She is having difficulty seeing out a left eye  She also has some numbness and tingling along the left side but no weakness  The patient had a little bit of weakness on the left side from previous strokes  The patient states that she gets symptoms like this at least once a week but has not had any blurry vision associated with the numbness and tingling on left side  The patient does have a history of strokes in the past as stated  She has had no other recent illnesses  The patient has not been taking her aspirin for the past several days either  She has had no nausea or vomiting  No fevers or chills  No chest pain or shortness of breath  She has little bit of lightheadedness and dizziness  She is able to see out of her left eye but states it is blurry    Upon my arrival patient was asleep and awoke given moderate verbal stimuli- she denies dysphagia, dysarthria, and difficulty with word finding  She denies recent weight loss and recent PNA  Past medical history:   Please see H&P for details    Special Studies:  8/24 MRI Brain: Acute to subacute left PCA infarct  Foci of left PCA/MCA watershed distribution infarct  Old right PCA distribution infarct  8/24 CTA head/neck: Noncontrast imaging of the brain demonstrates early subacute infarct identified within the left occipital lobe, series 2 image 28  This is new compared to MRI and CT scan of the brain dated 7/16/2018  No hemorrhagic transformation  Stable old infarct within the right parietal and occipital lobe  Stable extensive, chronic moyamoya pattern of vascular enhancement involving the anterior, middle and posterior cerebral artery circulations  Both cervical internal carotid arteries are smoothly narrowed beginning at the superior aspect of the internal carotid artery bulb within the neck and extending to the skull base, right greater than left  This is unchanged, as well      Social/Education/Vocational Hx:  Pt lives alone    Swallow Information   Current Risks for Dysphagia & Aspiration: CVA     Current Symptoms/Concerns: none reported    Current Diet: regular diet and thin liquids      Baseline Diet: regular diet and thin liquids      Baseline Assessment   Behavior/Cognition: alert    Speech/Language Status: able to participate in conversation and able to follow commands- mild word finding difficulty noted however patient reports this is just because she woke up- denies difficulty with language    Patient Positioning: upright in bed      Swallow Mechanism Exam   Facial: symmetrical  Labial: WFL  Lingual: WFL  Velum: symmetrical  Mandible: adequate ROM  Dentition: adequate  Vocal quality:clear/adequate   Volitional Cough: strong/productive   Respiratory: RA  Swallow Mechanics:WFL upon dry swallow     Consistencies Assessed and Performance   Consistencies Administered: thin liquids, puree, soft solids, hard solids and mixed consistency  Specific materials administered included: applesauce, fabricio crackers, raisin bran with thin milk- liquids were administered via single and consecutive cup and straw sips    Oral Stage: WFL  Mastication was adequate with the materials administered today  Bolus formation and transfer were functional with nosignificant oral residue noted  No overt s/s reduced oral control  Pharyngeal Stage: [facility]      Swallowing initiation appeared prompt  Laryngeal rise was palpated and judged to be within functional limits  No coughing, throat clearing, change in vocal quality or respiratory status noted today  Esophageal Concerns: none reported    Summary   Pts oropharyngeal swallow function appears generally WFL at this time with the materials administered today  She does not appear to be at risk for aspiration at this time       Recommendations: regular diet and thin liquids     Recommended Form of Meds: as per patient     Aspiration precautions and compensatory swallowing strategies: n/a    Results Reviewed with: patient, RN and dietician     Treatment Recommendations: No ST is indicated at this time- re-consult if patient's status changes    JAYNE Perez S , [id number] [facility]  Speech Language Pathologist   [id number]

## 2018-08-26 PROCEDURE — 99232 SBSQ HOSP IP/OBS MODERATE 35: CPT | Performed by: INTERNAL MEDICINE

## 2018-08-26 RX ORDER — DOXEPIN HYDROCHLORIDE 25 MG/1
25 CAPSULE ORAL
Status: DISCONTINUED | OUTPATIENT
Start: 2018-08-26 | End: 2018-08-27 | Stop reason: HOSPADM

## 2018-08-26 RX ORDER — CLONAZEPAM 0.5 MG/1
0.5 TABLET ORAL DAILY PRN
Status: DISCONTINUED | OUTPATIENT
Start: 2018-08-26 | End: 2018-08-27 | Stop reason: HOSPADM

## 2018-08-26 RX ADMIN — POTASSIUM CHLORIDE 10 MEQ: 750 TABLET, EXTENDED RELEASE ORAL at 17:57

## 2018-08-26 RX ADMIN — GABAPENTIN 300 MG: 300 CAPSULE ORAL at 08:17

## 2018-08-26 RX ADMIN — ATORVASTATIN CALCIUM 40 MG: 40 TABLET, FILM COATED ORAL at 17:57

## 2018-08-26 RX ADMIN — GABAPENTIN 300 MG: 300 CAPSULE ORAL at 15:54

## 2018-08-26 RX ADMIN — POTASSIUM CHLORIDE 10 MEQ: 750 TABLET, EXTENDED RELEASE ORAL at 08:17

## 2018-08-26 RX ADMIN — ENOXAPARIN SODIUM 40 MG: 40 INJECTION SUBCUTANEOUS at 08:17

## 2018-08-26 RX ADMIN — GABAPENTIN 300 MG: 300 CAPSULE ORAL at 21:15

## 2018-08-26 RX ADMIN — PAROXETINE HYDROCHLORIDE 20 MG: 20 TABLET, FILM COATED ORAL at 08:17

## 2018-08-26 RX ADMIN — ASPIRIN 325 MG: 325 TABLET ORAL at 08:17

## 2018-08-26 RX ADMIN — CLOPIDOGREL BISULFATE 75 MG: 75 TABLET ORAL at 08:17

## 2018-08-26 RX ADMIN — ACETAMINOPHEN 650 MG: 325 TABLET, FILM COATED ORAL at 11:27

## 2018-08-26 RX ADMIN — LEVOTHYROXINE SODIUM 137 MCG: 112 TABLET ORAL at 08:17

## 2018-08-26 NOTE — PROGRESS NOTES
[facility] Internal Medicine Progress Note  Patient: Fabiola Rosario 34 y o  female   MRN: [id number]  PCP: Tay Jean MD  Unit/Bed#: -[facility] Encounter: [id number]  Date Of Visit: 08/26/18    Assessment:    Principal Problem:    Acute CVA ([facility]bro[facility] accident) [facility])  Active Problems:    Tobacco use    Hypothyroidism    Dizziness    Blurry vision, left eye      Plan:  Acute to subacute left PCA infarct, known history of moyamoya syndrome  MRI of the brain did show acute to subacute left PCA infarct  Foci of left PCA/MCA watershed distribution infarct  Old right PCA distribution infarct also seen  Patient was noncompliant with her aspirin 325 mg prior to admission  Discuss management plan with Dr Mukherjee of Neurology  Recommended to continue aspirin 325 mg, Plavix 75 mg and statin  Formal neurology consult on Monday  Continue to monitor clinically  PT/OT     History of noncompliance  Stressed on importance of taking medications     Hypothyroidism  Continue levothyroxine     Nicotine dependence  Counseled on smoking cessation    Insomnia  Continue doxepin p r n  VTE Pharmacologic Prophylaxis:   Pharmacologic: Enoxaparin (Lovenox)  Mechanical VTE Prophylaxis in Place: Yes    Patient Centered Rounds: I have performed bedside rounds with nursing staff today  Discussions with Specialists or Other Care Team Provider:CM    Education and Discussions with Family / Patient: patient    Time Spent for Care: 20 minutes  More than 50% of total time spent on counseling and coordination of care as described above  Current Length of Stay: 2 day(s)    Current Patient Status: Inpatient   Certification Statement: The patient will continue to require additional inpatient hospital stay due to Neuro consult pending    Discharge Plan: Likely d/c in next 24 hours    Code Status: Level 1 - Full Code      Subjective:   Patient seen and examined  Her vision is improving  No complaints at this time  Objective:     Vitals:   Temp (24hrs), Av 3 °F (36 8 °C), Min:97 7 °F (36 5 °C), Max:98 9 °F (37 2 °C)    HR:  [61-86] 74  Resp:  [18] 18  BP: (103-136)/(56-80) 128/80  SpO2:  [95 %-98 %] 98 %  Body mass index is 33 58 kg/m²  Input and Output Summary (last 24 hours): Intake/Output Summary (Last 24 hours) at 18 1013  Last data filed at 18 0900   Gross per 24 hour   Intake              480 ml   Output                0 ml   Net              480 ml       Physical Exam:     Alert, oriented x3  Mucous membranes are moist  Lungs are clear to auscultation  Heart sounds are regular S1-S2  Abdomen soft nontender  Neurological no focal motor or sensory deficits  She has mild left-sided vision deficit temporal    Additional Data:     Labs:      Results from last 7 days  Lab Units 18  1447 18  1409   WBC Thousand/uL  --  13 20*   HEMOGLOBIN g/dL  --  11 3*   I STAT HEMOGLOBIN g/dl 10 9*  --    HEMATOCRIT % 32* 36 7   PLATELETS Thousands/uL  --  425*   NEUTROS PCT %  --  67   LYMPHS PCT %  --  27   MONOS PCT %  --  5   EOS PCT %  --  1       Results from last 7 days  Lab Units 18  0525  18  1409   SODIUM mmol/L 138  --  142   POTASSIUM mmol/L 4 0  --  3 9   CHLORIDE mmol/L 104  --  104   CO2 mmol/L 24  --  26   BUN mg/dL 7  --  8   CREATININE mg/dL 0 70  --  0 73   CALCIUM mg/dL 8 9  --  9 2   TOTAL PROTEIN g/dL  --   --  7 5   BILIRUBIN TOTAL mg/dL  --   --  0 30   ALK PHOS U/L  --   --  115   ALT U/L  --   --  35   AST U/L  --   --  14   GLUCOSE RANDOM mg/dL 118  --  139   GLUCOSE, ISTAT   --   < >  --    < > = values in this interval not displayed  Results from last 7 days  Lab Units 18  1409   INR  0 99       * I Have Reviewed All Lab Data Listed Above  * Additional Pertinent Lab Tests Reviewed:  All Labs For Current Hospital Admission Reviewed    Imaging:    Imaging Reports Reviewed Today Include:   Imaging Personally Reviewed by Myself Includes:      Recent Cultures (last 7 days):           Last 24 Hours Medication List:     Current Facility-Administered Medications:  acetaminophen 650 mg Oral Q4H PRN Jia Bello MD   aspirin 325 mg Oral Daily Sandie Moseley MD   atorvastatin 40 mg Oral QPM Jia Bello MD   clonazePAM 0 5 mg Oral Daily PRN Sandie Moseley MD   clopidogrel 75 mg Oral Daily NADEEN Amado   doxepin 25 mg Oral HS PRN Sandie Moseley MD   enoxaparin 40 mg Subcutaneous Q24H [facility] Gurdeep Downing MD   gabapentin 300 mg Oral TID Jia Bello MD   levothyroxine 137 mcg Oral Daily Jia Bello MD   nicotine 21 mg Transdermal Once [employer], NADEEN   ondansetron 4 mg Intravenous Q6H PRN Jia Bello MD   PARoxetine 20 mg Oral Daily Jia Bello MD   potassium chloride 10 mEq Oral BID Jia Bello MD        Today, Patient Was Seen By: Sandie Moseley MD    ** Please Note: Dragon 360 Dictation voice to text software may have been used in the creation of this document   **

## 2018-08-26 NOTE — PLAN OF CARE
Activity Intolerance/Impaired Mobility     Mobility/activity is maintained at optimum level for patient Progressing        Communication Impairment     Ability to express needs and understand communication Kerri Erickson Discharge to home or other facility with appropriate resources Progressing        INFECTION - ADULT     Absence or prevention of progression during hospitalization Progressing        Knowledge Deficit     Patient/family/caregiver demonstrates understanding of disease process, treatment plan, medications, and discharge instructions Progressing        Neurological Deficit     Neurological status is stable or improving Progressing        Nutrition     Nutrition/Hydration status is improving Progressing        PAIN - ADULT     Verbalizes/displays adequate comfort level or baseline comfort level Progressing        Potential for Aspiration     Non-ventilated patient's risk of aspiration is minimized Progressing     Ventilated patient's risk of aspiration is minimized Progressing        Potential for Falls     Patient will remain free of falls Progressing        Prexisting or High Potential for Compromised Skin Integrity     Skin integrity is maintained or improved Progressing        SAFETY ADULT     Maintain or return to baseline ADL function Progressing     Maintain or return mobility status to optimal level Progressing     Patient will remain free of falls Progressing

## 2018-08-27 VITALS
SYSTOLIC BLOOD PRESSURE: 119 MMHG | HEIGHT: 60 IN | HEART RATE: 69 BPM | TEMPERATURE: 98.5 F | OXYGEN SATURATION: 96 % | RESPIRATION RATE: 18 BRPM | DIASTOLIC BLOOD PRESSURE: 67 MMHG | BODY MASS INDEX: 33.76 KG/M2 | WEIGHT: 171.96 LBS

## 2018-08-27 PROCEDURE — 99255 IP/OBS CONSLTJ NEW/EST HI 80: CPT | Performed by: PHYSICIAN ASSISTANT

## 2018-08-27 PROCEDURE — 99239 HOSP IP/OBS DSCHRG MGMT >30: CPT | Performed by: INTERNAL MEDICINE

## 2018-08-27 RX ORDER — ATORVASTATIN CALCIUM 40 MG/1
40 TABLET, FILM COATED ORAL EVERY EVENING
Qty: 30 TABLET | Refills: 0 | Status: SHIPPED | OUTPATIENT
Start: 2018-08-27

## 2018-08-27 RX ADMIN — ASPIRIN 325 MG: 325 TABLET ORAL at 08:17

## 2018-08-27 RX ADMIN — POTASSIUM CHLORIDE 10 MEQ: 750 TABLET, EXTENDED RELEASE ORAL at 08:17

## 2018-08-27 RX ADMIN — GABAPENTIN 300 MG: 300 CAPSULE ORAL at 08:17

## 2018-08-27 RX ADMIN — CLOPIDOGREL BISULFATE 75 MG: 75 TABLET ORAL at 08:17

## 2018-08-27 RX ADMIN — ENOXAPARIN SODIUM 40 MG: 40 INJECTION SUBCUTANEOUS at 08:17

## 2018-08-27 RX ADMIN — PAROXETINE HYDROCHLORIDE 20 MG: 20 TABLET, FILM COATED ORAL at 08:17

## 2018-08-27 RX ADMIN — LEVOTHYROXINE SODIUM 137 MCG: 112 TABLET ORAL at 08:16

## 2018-08-27 NOTE — PLAN OF CARE
Problem: DISCHARGE PLANNING - CARE MANAGEMENT  Goal: Discharge to post-acute care or home with appropriate resources  INTERVENTIONS:  - Conduct assessment to determine patient/family and health care team treatment goals, and need for post-acute services based on payer coverage, community resources, and patient preferences, and barriers to discharge  - Address psychosocial, clinical, and financial barriers to discharge as identified in assessment in conjunction with the patient/family and health care team  - Arrange appropriate level of post-acute services according to patients   needs and preference and payer coverage in collaboration with the physician and health care team  - Communicate with and update the patient/family, physician, and health care team regarding progress on the discharge plan  - Arrange appropriate transportation to post-acute venues  Outcome: Completed Date Met: 08/27/18  Pt reported that she also has support from [employer] along with [employer] and [employer]  She has Mental Health  as well  She said that her boyfriend is picking her up upon discharge
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
## 2018-08-27 NOTE — CONSULTS
Consultation - Neurology   Jie Cobb 34 y o  female MRN: [id number]  Unit/Bed#: -01 Encounter: [id number]      Assessment/Plan   1  Left PCA and PCA/MCA infarcts  -MRI brain revealed acute to subacute left PCA infarct and foci in left PCA/MCA watershed distrubution  Old right PCA distribution infarct also noted  Etiology likely secondary to Moyamoya syndrome with hypoperfusion  -CTA head/neck showed stable, extensive, chronic Briscoe Briscoe pattern a vascular enhancement throughout  -Echo 07/16/2018 unremarkable  -Hemoglobin A1c 5 6    TSH WNL  -Blood pressure well controlled  -Continue aspirin 325mg and Lipitor 40mg  -Recommend follow up with Neuro-ophthalmologist as outpatient if able  Recommend no driving until evaluation   -Follow-up with current neurologist at [facility]    History of Present Illness     Reason for Consult / Principal Problem:  Stroke    HPI: Jie Cobb is a 34 y o  female with past medical history of moyamoya syndrome and previous stroke, and tobacco abuse who presented on 08/24 with complaints of left eye blurriness  Patient stated Dario Binet started suddenly the day prior to evaluation  Per chart review, it was noted that patient had a little bit of weakness on the left side from previous strokes with SLIM evaluation  Patient states she usually gets numbness and tingling on her left side, as noted below by outpatient neurologist, but left blurry vision is new  Patient admits to medication noncompliance, not taking her aspirin for many days  She states she just forgets to take her medication  She also reports she is trying to quite smoking  Of note, patient is homeless and lives in a tent  Today, patient is seen resting in chair comfortable  She states left eye blurriness has resolved  She states it feels similar to when she had her previous stroke   On exam, she has left superior temporal visual field cut, as noted by outpatient neurologist  She also appears to have questionable field cuts in all quadrants except superior temporal in the right eye, but gross vision is intact  She denies any other complaints  Denies CP, SOB, headache, dizziness, vision changes, N/V, abdominal pain, weakness or numbness  Last seen by [facility] 7/17/2018 for syncope with positive orthostatic vitals  Also felt hypoperfusion could be contributing with moyamoya syndrome  Routine EEG showed stomach activity left hemisphere with no clear epileptiform discharges  No AEDs recommended  MRI negative for acute intracranial abnormality/acute ischemia  Patient follows with [facility] and was last seen 08/15/2018  They note history of medicine noncompliance  Also noted that patient has sporadic, transient, self resolving neurological symptoms consisting of left arm numbness, left facial numbness, occasional word-finding difficulty, chronic headache, and memory dysfunction  No neurological complaints at that time  On exam, noted to have left temporal visual field cut  Instructed to continue aspirin 325 mg daily  Inpatient consult to Neurology  Consult performed by: Desmond Dunlap  Consult ordered by: Aaron Traore          Review of Systems  12 point ROS performed, as stated above, all others negative      Historical Information   Past Medical History:   Diagnosis Date    Disease of thyroid gland     History of CVA (cerebrovascular accident)     Hypertension     Moyamoya disease     Psychiatric disorder     Stroke ([facility] [address] )     Tobacco use      Past Surgical History:   Procedure Laterality Date    BRAIN SURGERY      BRAIN SURGERY  04/10/2017    moyamoya     Social History   History   Alcohol Use No     History   Drug Use No     History   Smoking Status    Current Some Day Smoker    Packs/day: 0 50    Types: Cigarettes   Smokeless Tobacco    Never Used     Family History:   Family History   Problem Relation Age of Onset  Depression Mother     Heart disease Father     Hypertension Father     Diabetes Father     Breast cancer Maternal Grandmother        Review of previous medical records was completed  Meds/Allergies   all current active meds have been reviewed and current meds:   Current Facility-Administered Medications   Medication Dose Route Frequency    acetaminophen (TYLENOL) tablet 650 mg  650 mg Oral Q4H PRN    aspirin tablet 325 mg  325 mg Oral Daily    atorvastatin (LIPITOR) tablet 40 mg  40 mg Oral QPM    clonazePAM (KlonoPIN) tablet 0 5 mg  0 5 mg Oral Daily PRN    clopidogrel (PLAVIX) tablet 75 mg  75 mg Oral Daily    doxepin (SINEquan) capsule 25 mg  25 mg Oral HS PRN    enoxaparin (LOVENOX) subcutaneous injection 40 mg  40 mg Subcutaneous Q24H URBANO    gabapentin (NEURONTIN) capsule 300 mg  300 mg Oral TID    levothyroxine tablet 137 mcg  137 mcg Oral Daily    ondansetron (ZOFRAN) injection 4 mg  4 mg Intravenous Q6H PRN    PARoxetine (PAXIL) tablet 20 mg  20 mg Oral Daily    potassium chloride (K-DUR,KLOR-CON) CR tablet 10 mEq  10 mEq Oral BID       Allergies   Allergen Reactions    Penicillin G Hives       Objective   Vitals:Blood pressure 125/60, pulse 72, temperature 98 3 °F (36 8 °C), temperature source Oral, resp  rate 18, height 5' (1 524 m), weight 78 kg (171 lb 15 3 oz), SpO2 99 %, not currently breastfeeding  ,Body mass index is 33 58 kg/m²  Intake/Output Summary (Last 24 hours) at 08/27/18 0955  Last data filed at 08/27/18 0820   Gross per 24 hour   Intake              760 ml   Output              350 ml   Net              410 ml       Invasive Devices: Invasive Devices     Peripheral Intravenous Line            Peripheral IV 08/25/18 Left Forearm 1 day                Physical Exam   Constitutional: She is oriented to person, place, and time  No distress  HENT:   Head: Normocephalic and atraumatic  Neck: Normal range of motion  Neck supple     Cardiovascular: Normal rate and regular rhythm  Pulmonary/Chest: Effort normal and breath sounds normal    Abdominal: Soft  Bowel sounds are normal    Neurological: She is oriented to person, place, and time  She has normal strength  She has a normal Finger-Nose-Finger Test    Reflex Scores:       Tricep reflexes are 2+ on the right side and 2+ on the left side  Bicep reflexes are 2+ on the right side and 2+ on the left side  Brachioradialis reflexes are 2+ on the right side and 2+ on the left side  Patellar reflexes are 3+ on the right side and 3+ on the left side  Achilles reflexes are 4+ on the right side and 4+ on the left side  Skin: Skin is warm and dry  She is not diaphoretic  Psychiatric: She has a normal mood and affect  Her speech is normal and behavior is normal  Judgment and thought content normal      Neurologic Exam     Mental Status   Oriented to person, place, and time  Attention: normal  Concentration: normal    Speech: speech is normal   Level of consciousness: alert    Cranial Nerves   Questionable decreased left nasolabial fold, but no volitional weakness noted  Chronic left superior temporal visual cut noted  Questionable field cut in all quadrants except superior temporal on right  Gross vision remains intact  Gaze is conjugate  Pupils equal, round, reactive  EOMIs  Motor Exam   Right arm pronator drift: absent  Left arm pronator drift: absent    Strength   Strength 5/5 throughout       Sensory Exam   Sensation intact to light touch and temperature throughout     Gait, Coordination, and Reflexes     Coordination   Finger to nose coordination: normal    Reflexes   Right brachioradialis: 2+  Left brachioradialis: 2+  Right biceps: 2+  Left biceps: 2+  Right triceps: 2+  Left triceps: 2+  Right patellar: 3+  Left patellar: 3+  Right achilles: 4+  Left achilles: 4+  Right plantar: normal  Left plantar: normal  Few beats not sustainable ankle clonus bilaterally       Lab Results: I have personally reviewed pertinent reports  No results found for this or any previous visit (from the past 24 hour(s))  Imaging Studies: I have personally reviewed pertinent reports  and I have personally reviewed pertinent films in PACS  EKG, Pathology, and Other Studies: I have personally reviewed pertinent reports      VTE Prophylaxis: Enoxaparin (Lovenox)

## 2018-08-27 NOTE — DISCHARGE SUMMARY
Discharge Summary - [facility] Internal Medicine    Patient Information: Albert Rees 34 y o  female MRN: [id number]  Unit/Bed#: -01 Encounter: [id number]    Discharging Physician / Practitioner: Melania Murphy MD  PCP: Merissa Lo MD  Admission Date: 8/24/2018  Discharge Date: 08/27/18    Disposition:     Home    Reason for Admission:  Acute CVA    Discharge Diagnoses:     Principal Problem:    Acute CVA (cerebrovascular accident) [facility])  Active Problems:    Tobacco use    Hypothyroidism    Dizziness    Blurry vision, left eye  Resolved Problems:    * No resolved hospital problems  *      Consultations During Hospital Stay:  · Neurology    Procedures Performed:     · None    Significant Findings / Test Results:     · MRI of the brain-acute to subacute left PCA infarct and foci in left PCA/MCA watershed distrubution  Old right PCA distribution infarct also noted  · CTA head/neck showed stable, extensive, chronic Harrison Clutter pattern a vascular enhancement throughout    Incidental Findings:   · none    Test Results Pending at Discharge (will require follow up):   · none     Outpatient Tests Requested:  · none    Complications:  none    Hospital Course: Albert Rees is a 34 y o  female patient with past medical history of hypothyroidism, moyamoya syndrome, previous strokes, tobacco use who originally presented to the hospital on 8/24/2018 due to complaints of left eye vision deficit  She did have previous admissions for strokes  She follows up with [facility] Neurology as an outpatient  Patient admitted to medication noncompliance, has not been taking aspirin for many days  On admission  CTA of the head did show stable, extensive chronic volume IR pattern a vascular enhancement throughout  MRI of the brain revealed acute to subacute left PCA infarct and a foci of left PCA/MCA watershed distribution  Old right PCA distribution infarct also noted   Etiology believed likely secondary to moyamoya syndrome with hypoperfusion  Car neurology evaluated the patient as well, recommended to continue aspirin 325 mg and Lipitor 40 mg  Patient should follow up with her current neurology did [facility] for further management  She is also recommended to follow up with neuro ophthalmologist as an outpatient if she is able to  She is recommended not to drive until further evaluation  Patient is counseled on importance of taking her medications as instructed  Condition at Discharge: stable     Discharge Day Visit / Exam:     Subjective:  Patient seen and examined  Her left eye blurriness significant improved  Vitals: Blood Pressure: 119/67 (08/27/18 1100)  Pulse: 69 (08/27/18 1100)  Temperature: 98 5 °F (36 9 °C) (08/27/18 1100)  Temp Source: Oral (08/27/18 1100)  Respirations: 18 (08/27/18 1100)  Height: 5' (152 4 cm) (08/24/18 1800)  Weight - Scale: 78 kg (171 lb 15 3 oz) (08/24/18 1800)  SpO2: 96 % (08/27/18 1100)  Exam:   Physical Exam   Constitutional: She is oriented to person, place, and time  She appears well-developed and well-nourished  No distress  HENT:   Head: Normocephalic and atraumatic  Eyes: EOM are normal  Pupils are equal, round, and reactive to light  Neck: Normal range of motion  Neck supple  Cardiovascular: Normal rate, regular rhythm and normal heart sounds  Pulmonary/Chest: Effort normal and breath sounds normal    Abdominal: Soft  Bowel sounds are normal    Musculoskeletal: Normal range of motion  Neurological: She is alert and oriented to person, place, and time  Skin: Skin is warm and dry  Discussion with Family:  Discussed with patient in detail about the management    Discharge instructions/Information to patient and family:   See after visit summary for information provided to patient and family  Provisions for Follow-Up Care:  See after visit summary for information related to follow-up care and any pertinent home health orders        Planned Readmission:  None     Discharge Statement:  I spent 35 minutes discharging the patient  This time was spent on the day of discharge  I had direct contact with the patient on the day of discharge  Greater than 50% of the total time was spent examining patient, answering all patient questions, arranging and discussing plan of care with patient as well as directly providing post-discharge instructions  Additional time then spent on discharge activities  Discharge Medications:  See after visit summary for reconciled discharge medications provided to patient and family        ** Please Note: This note has been constructed using a voice recognition system **

## 2018-08-28 NOTE — CASE MANAGEMENT
Notification of Discharge  This is a Notification of Discharge from our facility [address]  Please be advised that this patient has been discharge from our facility  Below you will find the admission and discharge date and time including the patients disposition  PRESENTATION DATE: 8/24/2018  1:50 PM  IP ADMISSION DATE: 8/24/18 1643  DISCHARGE DATE: 8/27/2018  2:13 PM  DISPOSITION: Home/Self Care    [address] [facility] in the [facility] Utilization Review Department  Phone: [phone]; Fax [phone]  ATTENTION: The Network Utilization Review Department is now centralized for our 9 Facilities  Make a note that we have a new phone and fax numbers for our Department  Please call with any questions or concerns to [phone] and carefully follow the prompts so that you are directed to the right person  All voicemails are confidential  Fax any determinations, approvals, denials, and requests for initial or continue stay review clinical to [phone]  Due to HIGH CALL volume, it would be easier if you could please send faxed requests to expedite your requests and in part, help us provide discharge notifications faster

## 2018-09-19 ENCOUNTER — HOSPITAL ENCOUNTER (EMERGENCY)
Facility: HOSPITAL | Age: 38
Discharge: HOME | End: 2018-09-19
Attending: EMERGENCY MEDICINE | Admitting: EMERGENCY MEDICINE
Payer: COMMERCIAL

## 2018-09-19 VITALS
OXYGEN SATURATION: 95 % | DIASTOLIC BLOOD PRESSURE: 76 MMHG | TEMPERATURE: 98.5 F | HEART RATE: 116 BPM | RESPIRATION RATE: 18 BRPM | SYSTOLIC BLOOD PRESSURE: 133 MMHG

## 2018-09-19 DIAGNOSIS — Z04.9 ENCOUNTER FOR OBSERVATION FOR SUSPECTED CONDITION: Primary | ICD-10-CM

## 2018-09-19 PROCEDURE — 99283 EMERGENCY DEPT VISIT LOW MDM: CPT

## 2018-09-19 RX ORDER — NICOTINE 21 MG/24HR
21 PATCH, TRANSDERMAL 24 HOURS TRANSDERMAL ONCE
Status: DISCONTINUED | OUTPATIENT
Start: 2018-09-19 | End: 2018-09-19 | Stop reason: HOSPADM

## 2018-09-19 RX ADMIN — NICOTINE 21 MG: 21 PATCH TRANSDERMAL at 18:06

## 2018-09-19 NOTE — ED NOTES
Report received from NICHOLAS Espinoza  Pt appears to be resting at this time  No distress noted  Will continue to monitor          Humberto Galan  09/19/18 2635

## 2018-09-19 NOTE — ED NOTES
Pt brought in via EMS  Pt given paper scrubs to change  Pt is laying in bed  With the scrubs at bedside          Camron Vuong  09/19/18 6801

## 2018-09-19 NOTE — ED PROVIDER NOTES
History  Chief Complaint   Patient presents with    Psychiatric Evaluation     Pt brought via EMS for psychiatric evaluation  EMS reports pt was acting out at [address] [employer], throwing clothes throughout street  Pt appears to be paranoid, accusing [address] [facility] [employer] of taking her clothing  Pt reports being bit by a raccoon on back of neck 2 weeks prior - as she is living in a tent  27-year-old female with history of MoyaMoya / previous CVA presenting for psych evaluation  Patient is homeless she lives in a [place] the [place], she goes to [address] to [employer], she states that she is been under stress and has been trying to quit smoking due to her cerebrovascular disease, she is upset about her living circumstances, that multiple of her homeless companions live with racisela's, she expressed concern at the shelter that the people that she resides with outside may have fleas by living with the raccoon's, she reports that after saying this they apparently would let her wash her clothing and she became very upset with this, EMS was called because the patient was reportedly irate, and she was brought to the emergency department for further evaluation where she has met at bedside  She is awake alert cooperative she again notes that she has been trying to quit smoking citing her recent stroke she is requesting outpatient referral, she states that she does not have fleas, she had expressed concern about other people having fleas, she states that the raccoon's sleep with the other homeless people she does not allow them to sleep with her, she has no itching scratching no visible bugs or bug bites or suspicious lesions for bug bites  She notes that approximately 2 weeks ago 1 of the reportedly trained / pet raccoon's that belongs to 1 of the other homeless individuals allegedly snuck into her tent and licked her in the back of the neck and put it is mouth on her neck but did not break skin scratch her    She otherwise has no other acute medical complaints or concerns she is not suicidal or homicidal she denies having visual auditory hallucinations she denies drug or alcohol use she states she was upset but does not have paranoid thoughts  She otherwise would not be here according to the patient  Complete review systems otherwise negative as noted            Prior to Admission Medications   Prescriptions Last Dose Informant Patient Reported? Taking?    PARoxetine (PAXIL) 20 mg tablet   No No   Sig: Take 1 tablet by mouth daily   aspirin 81 mg chewable tablet   No No   Sig: Chew 4 tablets (324 mg total) daily   atorvastatin (LIPITOR) 40 mg tablet   No No   Sig: Take 1 tablet (40 mg total) by mouth every evening   clonazePAM (KlonoPIN) 0 5 mg tablet   Yes No   Sig: Take 0 5 mg by mouth 2 (two) times a day as needed     doxepin (SINEquan) 25 mg capsule   No No   Sig: Take 1 capsule by mouth daily at bedtime   Patient taking differently: Take 25 mg by mouth daily at bedtime as needed     gabapentin (NEURONTIN) 300 mg capsule   No No   Sig: Take 1 capsule by mouth 3 (three) times a day   levothyroxine 137 mcg tablet   No No   Sig: Take 1 tablet by mouth daily   nicotine (NICODERM CQ) 14 mg/24hr TD 24 hr patch   No No   Sig: Place 1 patch on the skin daily   potassium chloride (K-DUR,KLOR-CON) 10 mEq tablet   No No   Sig: Take 1 tablet (10 mEq total) by mouth 2 (two) times a day      Facility-Administered Medications: None       Past Medical History:   Diagnosis Date    Disease of thyroid gland     History of CVA (cerebrovascular accident)     Hypertension     Moyamoya disease     Psychiatric disorder     Stroke ([facility] [address] )     Tobacco use        Past Surgical History:   Procedure Laterality Date    BRAIN SURGERY      BRAIN SURGERY  04/10/2017    moyamoya       Family History   Problem Relation Age of Onset    Depression Mother     Heart disease Father     Hypertension Father     Diabetes Father     Breast cancer Maternal Grandmother      I have reviewed and agree with the history as documented  Social History   Substance Use Topics    Smoking status: Current Some Day Smoker     Packs/day: 0 50     Types: Cigarettes    Smokeless tobacco: Never Used    Alcohol use No        Review of Systems   Constitutional: Negative for activity change, appetite change, chills and fever  HENT: Negative for rhinorrhea and sore throat  Eyes: Negative for photophobia and pain  Respiratory: Negative for cough and shortness of breath  Cardiovascular: Negative for chest pain and palpitations  Gastrointestinal: Negative for abdominal pain, diarrhea, nausea and vomiting  Genitourinary: Negative for dysuria, frequency and urgency  Musculoskeletal: Negative for arthralgias, back pain and myalgias  Skin: Negative for color change, rash and wound  Neurological: Negative for dizziness, weakness and headaches  Hematological: Negative for adenopathy  Does not bruise/bleed easily  Psychiatric/Behavioral: Negative for agitation, behavioral problems, hallucinations, self-injury, sleep disturbance and suicidal ideas  The patient is not nervous/anxious  All other systems reviewed and are negative  Physical Exam  Physical Exam   Constitutional: She is oriented to person, place, and time  She appears well-developed and well-nourished  No distress  HENT:   Head: Normocephalic and atraumatic  Eyes: EOM are normal  Pupils are equal, round, and reactive to light  Neck: Normal range of motion  Neck supple  No tracheal deviation present  Cardiovascular: Normal rate, regular rhythm and normal heart sounds  Exam reveals no gallop and no friction rub  No murmur heard  Pulmonary/Chest: Effort normal and breath sounds normal  She has no wheezes  She has no rales  Abdominal: Soft  Bowel sounds are normal  She exhibits no distension  There is no tenderness  There is no rebound and no guarding     Musculoskeletal: Normal range of motion  She exhibits no edema or tenderness  Neurological: She is alert and oriented to person, place, and time  No cranial nerve deficit  She exhibits normal muscle tone  Coordination normal    Skin: Skin is warm and dry  No rash noted  Patient's skin and hair were examined there are no bites no suspicious lesions for fleas scabies or other infectious pediculosis or lesions   Psychiatric: She has a normal mood and affect  Her behavior is normal    Well-appearing conversational no acute distress good eye contact appropriate good insight she does not appear to be responding to internal stimuli, she is clinically sober alert oriented x3   Nursing note and vitals reviewed        Vital Signs  ED Triage Vitals [09/19/18 1642]   Temperature Pulse Respirations Blood Pressure SpO2   98 5 °F (36 9 °C) (!) 116 18 133/76 95 %      Temp Source Heart Rate Source Patient Position - Orthostatic VS BP Location FiO2 (%)   Oral Monitor Sitting Left arm --      Pain Score       No Pain           Vitals:    09/19/18 1642   BP: 133/76   Pulse: (!) 116   Patient Position - Orthostatic VS: Sitting       Visual Acuity      ED Medications  Medications - No data to display    Diagnostic Studies  Results Reviewed     None                 No orders to display              Procedures  Procedures       Phone Contacts  ED Phone Contact    ED Course  ED Course as of Sep 20 1949   Wed Sep 19, 2018   1803 Patient declined rabies understands risks benefits alternatives to this    5 Patient is not suicidal or homicidal there are no grounds for 302 she does not wish to sign in she does not appear psychotic or have delusions she has no other complaints or concerns requesting outpatient follow-up understands agrees to discharge instructions plan                                MDM  Number of Diagnoses or Management Options  Encounter for observation for suspected condition:   Diagnosis management comments: 70-year-old female with history of tray, recent CVA here for psychiatric evaluation after becoming upset at a local homeless shelter, she states that she is under stress and trying to quit smoking, upset that they would not let her wash her clothing, here at bedside she is awake alert and pleasant she offers no complaints she denies a complete 12 system review of systems she denies having signs or symptoms of pediculosis her fleas, discussed risks benefits and alternatives for questionable raccoon bite that occurred approximately 2 weeks ago, strongly recommended rabies prophylaxis, based on history, patient strongly denied understands risks benefits alternatives to this otherwise she has good insight she is clinically sober she is not suicidal homicidal she is not paranoid on history or physical exam she is appropriate she has no other medical complaints, will provide the patient a meal nicotine patch will have crisis evaluate although at this time her there are no grounds for 302, disposition pending further evaluation and reassessment    CritCare Time    Disposition  Final diagnoses:   Encounter for observation for suspected condition     Time reflects when diagnosis was documented in both MDM as applicable and the Disposition within this note     Time User Action Codes Description Comment    9/19/2018  7:22 PM Lien Jett [Z04 [id number]] Encounter for observation for suspected condition       ED Disposition     ED Disposition Condition Comment    Discharge  [place] discharge to home/self care      Condition at discharge: Good        Follow-up Information     Follow up With Specialties Details Why Contact Info Additional [address] [facility] Emergency Department Emergency Medicine  If symptoms worsen [address] [facility] [address]  [phone] [place] ED, [address], [place], [facility] Marcin Correa MD Family Medicine In 1 week  [address] [facility] [address] MD Margy Family Medicine In 1 week  [address] [facility] [address] [place] [address] [facility] In 1 week  [address] [facility]  [phone]           Discharge Medication List as of 9/19/2018  7:24 PM      CONTINUE these medications which have NOT CHANGED    Details   aspirin 81 mg chewable tablet Chew 4 tablets (324 mg total) daily, Starting Sat 7/7/2018, Normal      atorvastatin (LIPITOR) 40 mg tablet Take 1 tablet (40 mg total) by mouth every evening, Starting Mon 8/27/2018, Normal      clonazePAM (KlonoPIN) 0 5 mg tablet Take 0 5 mg by mouth 2 (two) times a day as needed  , Historical Med      doxepin (SINEquan) 25 mg capsule Take 1 capsule by mouth daily at bedtime, Starting Tue 10/10/2017, No Print      gabapentin (NEURONTIN) 300 mg capsule Take 1 capsule by mouth 3 (three) times a day, Starting Tue 10/10/2017, No Print      levothyroxine 137 mcg tablet Take 1 tablet by mouth daily, Starting Tue 7/18/2017, Print      nicotine (NICODERM CQ) 14 mg/24hr TD 24 hr patch Place 1 patch on the skin daily, Starting Wed 7/18/2018, Normal      PARoxetine (PAXIL) 20 mg tablet Take 1 tablet by mouth daily, Starting Wed 10/11/2017, No Print      potassium chloride (K-DUR,KLOR-CON) 10 mEq tablet Take 1 tablet (10 mEq total) by mouth 2 (two) times a day, Starting Tue 7/17/2018, Normal           No discharge procedures on file      ED Provider  Electronically Signed by           Renetta Gregorio DO  09/20/18 1949

## 2018-09-19 NOTE — ED NOTES
Per Charge Nurse, Belongings do not need to be inventoried, as Pt will be discharged with a nicotine patch and a referral for a PCP        Bob Jean  09/19/18 8320

## 2018-09-19 NOTE — ED NOTES
Pt given urine cup to attempt to give a sample  Pt states that she has been living outside and might be dehydrated  Pt given water  Pt also mentioned that there is a possibility of her having fleas  Provider and Charge Nurse made aware  Per Charge Nurse, belongings will not be inventoried until Pt is checked for fleas by provider        Kathy Swain  09/19/18 [facility] [employer] [facility]  09/19/18 7579

## 2018-09-19 NOTE — DISCHARGE INSTRUCTIONS
Please return if you worsening or other concerning symptoms otherwise your to follow up as instructed as discussed    Ischemic Stroke   AMBULATORY CARE:   An ischemic stroke  occurs when blood flow to a part of your brain is blocked  The blockage is usually caused by a blood clot that gets stuck in a narrow blood vessel  When oxygen cannot get to an area of the brain, tissue in that area may get damaged  The damage can cause loss of body functions controlled by that area of the brain  Warning signs of a stroke: The word F A S T  can help you remember and recognize signs of a stroke:  · F = Face:  One side of the face droops  · A = Arms:  One arm starts to drop when both arms are raised  · S = Speech:  Speech is slurred or sounds different than usual     · T = Time:  A person who is having a stroke needs to be seen immediately  A stroke is a medical emergency that needs immediate treatment  Some medicines and treatments work best if given within a few hours of a stroke  Early treatment can decrease the risk of long-term effects of a stroke  ·        Common symptoms include the following:  Signs and symptoms may begin suddenly and worsen quickly  Any of the following may appear minutes or hours after a stroke, and worsen quickly:  · Severe headache    · Blurred or double vision, or vision loss    · Numbness, tingling, weakness, or paralysis on one side of your body    · Trouble walking or speaking    · Dizziness, confusion, or fainting  Call 911 for any of the following:   ·            · You have any of the following signs of a stroke:      ¨ Numbness or drooping on one side of your face     ¨ Weakness in an arm or leg    ¨ Confusion or difficulty speaking    ¨ Dizziness, a severe headache, or vision loss    · You have a seizure  · You feel lightheaded, short of breath, and have chest pain  · You cough up blood  · You have a severe headache, or loss of balance or coordination    Seek care immediately if:   · Your arm or leg feels warm, tender, and painful  It may look swollen and red  · You have double vision or vision loss  · You have unusual or heavy bleeding  Contact your healthcare provider or neurologist if:   · Your blood pressure is higher or lower than you were told it should be  · You have questions or concerns about your condition or care  Treatment:   · Thrombolytics  help break apart clots  · Antiplatelets , such as aspirin, help prevent blood clots  Take your antiplatelet medicine exactly as directed  These medicines make it more likely for you to bleed or bruise  If you are told to take aspirin, do not take acetaminophen or ibuprofen instead  · Blood thinners    help prevent blood clots  Examples of blood thinners include heparin and warfarin  Clots can cause strokes, heart attacks, and death  The following are general safety guidelines to follow while you are taking a blood thinner:    ¨ Watch for bleeding and bruising while you take blood thinners  Watch for bleeding from your gums or nose  Watch for blood in your urine and bowel movements  Use a soft washcloth on your skin, and a soft toothbrush to brush your teeth  This can keep your skin and gums from bleeding  If you shave, use an electric shaver  Do not play contact sports  ¨ Tell your dentist and other healthcare providers that you take anticoagulants  Wear a bracelet or necklace that says you take this medicine  ¨ Do not start or stop any medicines unless your healthcare provider tells you to  Many medicines cannot be used with blood thinners  ¨ Tell your healthcare provider right away if you forget to take the medicine, or if you take too much  ¨ Warfarin  is a blood thinner that you may need to take  The following are things you should be aware of if you take warfarin  § Foods and medicines can affect the amount of warfarin in your blood   Do not make major changes to your diet while you take warfarin  Warfarin works best when you eat about the same amount of vitamin K every day  Vitamin K is found in green leafy vegetables and certain other foods  Ask for more information about what to eat when you are taking warfarin  § You will need to see your healthcare provider for follow-up visits when you are on warfarin  You will need regular blood tests  These tests are used to decide how much medicine you need  · Other medicines  may be given to treat other health conditions such as high cholesterol, high blood pressure, or diabetes  · Surgery  may be needed to remove the clot  You may also need surgery to widen arteries or to place a filter into a blood vessel  This surgery helps to improve blood flow and prevent clots  Self-care:   · Go to rehabilitation (rehab) as directed  Rehab is an important part of treatment  A speech therapist helps you relearn or improve your ability to talk and swallow  You may start slowly and start doing more difficult tasks over time  Physical therapists can help you gain strength and build endurance  Occupational therapists teach you new ways to do daily activities, such as getting dressed  Therapy can help you improve your ability to walk or keep your balance  Your therapy may include tasks or movements you will need to do for everyday activities  An example is being able to raise or lower yourself from a chair  · Make your home safe  Remove anything you might trip over  Tape electrical cords down  Keep paths clear throughout your home  Make sure your home is well lit  Put nonslip materials on surfaces that might be slippery  An example is your bathtub or shower floor  · Use walking devices as directed  A cane or walker may help you keep your balance as you walk  What you need to know about depression:  Depression can happen after a stroke  Talk to your healthcare provider if you have depression that continues or is getting worse   Your provider may be able to help treat your depression  Your provider can also recommend support groups for you to join  A support group is a place to talk with others who have had a stroke  It may also help to talk to friends and family members about how you are feeling  Tell your family and friends that if they see these signs, to let your healthcare provider know  You may show any of the following signs of depression:  · Extreme sadness    · Avoiding social interaction with family or friends    · A lack of interest in things you once enjoyed    · Irritability    · Trouble sleeping    · Low energy levels    · A change in eating habits or sudden weight gain or loss  Decrease your risk for another stroke:   · Manage health conditions  Take your medicine as directed  Check your blood pressure and blood sugar levels as directed  Keep a record and bring it to your follow-up visits  · Eat a variety of healthy foods  Healthy foods include whole-grain breads, low-fat dairy products, beans, fish, and lean meats  Eat at least 5 servings of fruits and vegetables each day  Choose foods that are low in salt, unhealthy fats (saturated and trans fat), salt, and sugar  Eat foods that are high in potassium, such as potatoes and bananas  Ask your dietitian what other nutrition guidelines you should follow  He or she can help you choose foods that are right for you  · Maintain a healthy weight  Ask your healthcare provider how much you should weigh  Ask him or her to help you create a weight loss plan if you are overweight  Ask about the best exercise plan for you  · Do not drink alcohol  Alcohol can increase your risk for a stroke  Alcohol may also increase your blood pressure or thin your blood  Blood thinning can increase your risk for hemorrhagic stroke  · Do not smoke cigarettes or use illegal drugs  Smoking and drugs increase your risk for a stroke   Nicotine and other chemicals in cigarettes and cigars can also cause lung damage  Ask your healthcare provider for information if you currently smoke or use drugs and need help to quit  E-cigarettes or smokeless tobacco still contain nicotine  Talk to your healthcare provider before you use these products  Follow up with your healthcare provider or neurologist as directed: You may need to come in for regular tests of your brain function  You may also need regular blood tests  Write down your questions so you remember to ask them during your visits  © 2017 [facility] Information is for End User's use only and may not be sold, redistributed or otherwise used for commercial purposes  All illustrations and images included in CareNotes® are the copyrighted property of [employer]  or [employer]  The above information is an  only  It is not intended as medical advice for individual conditions or treatments  Talk to your doctor, nurse or pharmacist before following any medical regimen to see if it is safe and effective for you

## 2018-09-19 NOTE — ED NOTES
Pt asking to call her emergency contact  Pt told that the doctor needs to see her before she is given a phone       Yi Raw  09/19/18 [id number]

## 2018-09-19 NOTE — ED NOTES
Walked in to check on Pt  Pt states she fell asleep and then got up to get changed        Saturnino Ji  09/19/18 [id number]

## 2018-09-19 NOTE — ED NOTES
Pt is pacing around the [facility]  Pt walks up to nursing window and stands there for a few minutes before returning to pacing the Area        Camron Vuong  09/19/18 [id number]

## 2018-09-21 ENCOUNTER — HOSPITAL ENCOUNTER (EMERGENCY)
Facility: HOSPITAL | Age: 38
Discharge: HOME | End: 2018-09-21
Attending: EMERGENCY MEDICINE
Payer: COMMERCIAL

## 2018-09-21 VITALS
OXYGEN SATURATION: 97 % | BODY MASS INDEX: 32.47 KG/M2 | HEIGHT: 61 IN | TEMPERATURE: 98.5 F | SYSTOLIC BLOOD PRESSURE: 136 MMHG | HEART RATE: 83 BPM | WEIGHT: 171.96 LBS | RESPIRATION RATE: 18 BRPM | DIASTOLIC BLOOD PRESSURE: 84 MMHG

## 2018-09-21 DIAGNOSIS — Z04.9 ENCOUNTER FOR OBSERVATION FOR SUSPECTED CONDITION: Primary | ICD-10-CM

## 2018-09-21 PROCEDURE — 99282 EMERGENCY DEPT VISIT SF MDM: CPT

## 2018-09-21 NOTE — DISCHARGE INSTRUCTIONS
Follow up your physician    Wellness Visit for Adults   AMBULATORY CARE:   A wellness visit  is when you see your healthcare provider to get screened for health problems  You can also get advice on how to stay healthy  Write down your questions so you remember to ask them  Ask your healthcare provider how often you should have a wellness visit  What happens at a wellness visit:  Your healthcare provider will ask about your health, and your family history of health problems  This includes high blood pressure, heart disease, and cancer  He or she will ask if you have symptoms that concern you, if you smoke, and about your mood  You may also be asked about your intake of medicines, supplements, food, and alcohol  Any of the following may be done:  · Your weight  will be checked  Your height may also be checked so your body mass index (BMI) can be calculated  Your BMI shows if you are at a healthy weight  · Your blood pressure  and heart rate will be checked  Your temperature may also be checked  · Blood and urine tests  may be done  Blood tests may be done to check your cholesterol levels  Abnormal cholesterol levels increase your risk for heart disease and stroke  You may also need a blood or urine test to check for diabetes if you are at increased risk  Urine tests may be done to look for signs of an infection or kidney disease  · A physical exam  includes checking your heartbeat and lungs with a stethoscope  Your healthcare provider may also check your skin to look for sun damage  · Screening tests  may be recommended  A screening test is done to check for diseases that may not cause symptoms  The screening tests you may need depend on your age, gender, family history, and lifestyle habits  For example, colorectal screening may be recommended if you are 48years old or older  Screening tests you need if you are a woman:   · A Pap smear  is used to screen for cervical cancer   Pap smears are usually done every 3 to 5 years depending on your age  You may need them more often if you have had abnormal Pap smear test results in the past  Ask your healthcare provider how often you should have a Pap smear  · A mammogram  is an x-ray of your breasts to screen for breast cancer  Experts recommend mammograms every 2 years starting at age 48 years  You may need a mammogram at age 52 years or younger if you have an increased risk for breast cancer  Talk to your healthcare provider about when you should start having mammograms and how often you need them  Vaccines you may need:   · Get an influenza vaccine  every year  The influenza vaccine protects you from the flu  Several types of viruses cause the flu  The viruses change over time, so new vaccines are made each year  · Get a tetanus-diphtheria (Td) booster vaccine  every 10 years  This vaccine protects you against tetanus and diphtheria  Tetanus is a severe infection that may cause painful muscle spasms and lockjaw  Diphtheria is a severe bacterial infection that causes a thick covering in the back of your mouth and throat  · Get a human papillomavirus (HPV) vaccine  if you are female and aged 23 to 32 or male 23 to 24 and never received it  This vaccine protects you from HPV infection  HPV is the most common infection spread by sexual contact  HPV may also cause vaginal, penile, and anal cancers  · Get a pneumococcal vaccine  if you are aged 72 years or older  The pneumococcal vaccine is an injection given to protect you from pneumococcal disease  Pneumococcal disease is an infection caused by pneumococcal bacteria  The infection may cause pneumonia, meningitis, or an ear infection  · Get a shingles vaccine  if you are aged 61 or older, even if you have had shingles before  The shingles vaccine is an injection to protect you from the varicella-zoster virus  This is the same virus that causes chickenpox   Shingles is a painful rash that develops in people who had chickenpox or have been exposed to the virus  How to eat healthy:  My Plate is a model for planning healthy meals  It shows the types and amounts of foods that should go on your plate  Fruits and vegetables make up about half of your plate, and grains and protein make up the other half  A serving of dairy is included on the side of your plate  The amount of calories and serving sizes you need depends on your age, gender, weight, and height  Examples of healthy foods are listed below:  · Eat a variety of vegetables  such as dark green, red, and orange vegetables  You can also include canned vegetables low in sodium (salt) and frozen vegetables without added butter or sauces  · Eat a variety of fresh fruits , canned fruit in 100% juice, frozen fruit, and dried fruit  · Include whole grains  At least half of the grains you eat should be whole grains  Examples include whole-wheat bread, wheat pasta, brown rice, and whole-grain cereals such as oatmeal     · Eat a variety of protein foods such as seafood (fish and shellfish), lean meat, and poultry without skin (turkey and chicken)  Examples of lean meats include pork leg, shoulder, or tenderloin, and beef round, sirloin, tenderloin, and extra lean ground beef  Other protein foods include eggs and egg substitutes, beans, peas, soy products, nuts, and seeds  · Choose low-fat dairy products such as skim or 1% milk or low-fat yogurt, cheese, and cottage cheese  · Limit unhealthy fats  such as butter, hard margarine, and shortening  Exercise:  Exercise at least 30 minutes per day on most days of the week  Some examples of exercise include walking, biking, dancing, and swimming  You can also fit in more physical activity by taking the stairs instead of the elevator or parking farther away from stores  Include muscle strengthening activities 2 days each week  Regular exercise provides many health benefits   It helps you manage your weight, and decreases your risk for type 2 diabetes, heart disease, stroke, and high blood pressure  Exercise can also help improve your mood  Ask your healthcare provider about the best exercise plan for you  General health and safety guidelines:   · Do not smoke  Nicotine and other chemicals in cigarettes and cigars can cause lung damage  Ask your healthcare provider for information if you currently smoke and need help to quit  E-cigarettes or smokeless tobacco still contain nicotine  Talk to your healthcare provider before you use these products  · Limit alcohol  A drink of alcohol is 12 ounces of beer, 5 ounces of wine, or 1½ ounces of liquor  · Lose weight, if needed  Being overweight increases your risk of certain health conditions  These include heart disease, high blood pressure, type 2 diabetes, and certain types of cancer  · Protect your skin  Do not sunbathe or use tanning beds  Use sunscreen with a SPF 15 or higher  Apply sunscreen at least 15 minutes before you go outside  Reapply sunscreen every 2 hours  Wear protective clothing, hats, and sunglasses when you are outside  · Drive safely  Always wear your seatbelt  Make sure everyone in your car wears a seatbelt  A seatbelt can save your life if you are in an accident  Do not use your cell phone when you are driving  This could distract you and cause an accident  Pull over if you need to make a call or send a text message  · Practice safe sex  Use latex condoms if are sexually active and have more than one partner  Your healthcare provider may recommend screening tests for sexually transmitted infections (STIs)  · Wear helmets, lifejackets, and protective gear  Always wear a helmet when you ride a bike or motorcycle, go skiing, or play sports that could cause a head injury  Wear protective equipment when you play sports  Wear a lifejacket when you are on a boat or doing water sports    © 2017 [facility] Information is for End User's use only and may not be sold, redistributed or otherwise used for commercial purposes  All illustrations and images included in CareNotes® are the copyrighted property of [employer]  or [employer]  The above information is an  only  It is not intended as medical advice for individual conditions or treatments  Talk to your doctor, nurse or pharmacist before following any medical regimen to see if it is safe and effective for you

## 2018-09-21 NOTE — ED PROVIDER NOTES
History  Chief Complaint   Patient presents with    Homeless     Pt dropped off by police because she was found wondering arround; pt states she missed the last bus  HPI  Patient is a 78-year-old female, known to me, she reports that she is homeless, she lives in a tent  She apparently was found wandering around by the police  Apparently she told him that she wanted to come to the hospital for a checkup  Patient reports she had a previous stroke and she does wanted to be evaluated to make sure she was okay  Patient reports she has trouble with her vision related to the stroke but it is at baseline  She denies any headache  There is no chest pain  There is no shortness of breath  She denies any focal weakness  Patient reports primarily she just wanted to the not go to [facility] so she came to the hospital   Past medical history of CVA, psychiatric disorder, hypertension, brain surgery  Family history noncontributory  Social history, homeless, smoker, denies drug abuse  Prior to Admission Medications   Prescriptions Last Dose Informant Patient Reported? Taking?    PARoxetine (PAXIL) 20 mg tablet   No No   Sig: Take 1 tablet by mouth daily   aspirin 81 mg chewable tablet   No No   Sig: Chew 4 tablets (324 mg total) daily   atorvastatin (LIPITOR) 40 mg tablet   No No   Sig: Take 1 tablet (40 mg total) by mouth every evening   clonazePAM (KlonoPIN) 0 5 mg tablet   Yes No   Sig: Take 0 5 mg by mouth 2 (two) times a day as needed     doxepin (SINEquan) 25 mg capsule   No No   Sig: Take 1 capsule by mouth daily at bedtime   Patient taking differently: Take 25 mg by mouth daily at bedtime as needed     gabapentin (NEURONTIN) 300 mg capsule   No No   Sig: Take 1 capsule by mouth 3 (three) times a day   levothyroxine 137 mcg tablet   No No   Sig: Take 1 tablet by mouth daily   nicotine (NICODERM CQ) 14 mg/24hr TD 24 hr patch   No No   Sig: Place 1 patch on the skin daily   potassium chloride (K-DUR,KLOR-CON) 10 mEq tablet   No No   Sig: Take 1 tablet (10 mEq total) by mouth 2 (two) times a day      Facility-Administered Medications: None       Past Medical History:   Diagnosis Date    Disease of thyroid gland     History of CVA (cerebrovascular accident)     Hypertension     Moyamoya disease     Psychiatric disorder     Stroke ([facility] [address] )     Tobacco use        Past Surgical History:   Procedure Laterality Date    BRAIN SURGERY      BRAIN SURGERY  04/10/2017    moyamoya       Family History   Problem Relation Age of Onset    Depression Mother     Heart disease Father     Hypertension Father     Diabetes Father     Breast cancer Maternal Grandmother      I have reviewed and agree with the history as documented  Social History   Substance Use Topics    Smoking status: Current Some Day Smoker     Packs/day: 0 50     Types: Cigarettes    Smokeless tobacco: Never Used    Alcohol use No        Review of Systems   Constitutional: Negative for diaphoresis, fatigue and fever  HENT: Negative for congestion, ear pain, nosebleeds and sore throat  Eyes: Negative for photophobia, pain, discharge and visual disturbance  Respiratory: Negative for cough, choking, chest tightness, shortness of breath and wheezing  Cardiovascular: Negative for chest pain and palpitations  Gastrointestinal: Negative for abdominal distention, abdominal pain, diarrhea and vomiting  Genitourinary: Negative for dysuria, flank pain and frequency  Musculoskeletal: Negative for back pain, gait problem and joint swelling  Skin: Negative for color change and rash  Neurological: Negative for dizziness, syncope and headaches  Psychiatric/Behavioral: Negative for behavioral problems and confusion  The patient is not nervous/anxious  All other systems reviewed and are negative  Physical Exam  Physical Exam   Constitutional: She is oriented to person, place, and time  She appears well-developed and well-nourished     HENT:   Head: Normocephalic  Right Ear: External ear normal    Left Ear: External ear normal    Nose: Nose normal    Mouth/Throat: Oropharynx is clear and moist    Eyes: EOM and lids are normal  Pupils are equal, round, and reactive to light  Neck: Normal range of motion  Neck supple  Cardiovascular: Normal rate, regular rhythm, normal heart sounds and intact distal pulses  Pulmonary/Chest: Effort normal and breath sounds normal  No respiratory distress  Abdominal: Soft  She exhibits no distension  There is no tenderness  Musculoskeletal: Normal range of motion  She exhibits no deformity  Neurological: She is alert and oriented to person, place, and time  Skin: Skin is warm and dry  Psychiatric: She has a normal mood and affect  Nursing note and vitals reviewed  Pulse oximetry 97% on room air adequate oxygenation, there is no hypoxia    Vital Signs  ED Triage Vitals [09/21/18 0137]   Temperature Pulse Respirations Blood Pressure SpO2   98 5 °F (36 9 °C) 83 18 136/84 97 %      Temp Source Heart Rate Source Patient Position - Orthostatic VS BP Location FiO2 (%)   Oral Monitor Lying Right arm --      Pain Score       --           Vitals:    09/21/18 0137   BP: 136/84   Pulse: 83   Patient Position - Orthostatic VS: Lying       Visual Acuity      ED Medications  Medications - No data to display    Diagnostic Studies  Results Reviewed     None                 No orders to display              Procedures  Procedures       Phone Contacts  ED Phone Contact    ED Course                               MDM medical decision making 73-year-old female brought in by the police, found wandering, apparently homeless and told the police that she needed a checkup, did not want to go to longterm  Patient is at baseline, I saw her in July  There is no change in her neurological status  We discussed outpatient follow-up  We discussed use of local shelters  We discussed indications to return    [facility] Time    Disposition  Final diagnoses:   Encounter for observation for suspected condition     Time reflects when diagnosis was documented in both MDM as applicable and the Disposition within this note     Time User Action Codes Description Comment    9/21/2018  1:51 AM Rob Box Add [Z04 9] Encounter for observation for suspected condition       ED Disposition     ED Disposition Condition Comment    Discharge  Pamela Oliver discharge to home/self care  Condition at discharge: Good        Follow-up Information     Follow up With Specialties Details Why [facility] Petra Ramos MD Family Medicine   [address] [place] [address] [address]  [address] [place] [id number] [phone]            Discharge Medication List as of 9/21/2018  1:53 AM      CONTINUE these medications which have NOT CHANGED    Details   aspirin 81 mg chewable tablet Chew 4 tablets (324 mg total) daily, Starting Sat 7/7/2018, Normal      atorvastatin (LIPITOR) 40 mg tablet Take 1 tablet (40 mg total) by mouth every evening, Starting Mon 8/27/2018, Normal      clonazePAM (KlonoPIN) 0 5 mg tablet Take 0 5 mg by mouth 2 (two) times a day as needed  , Historical Med      doxepin (SINEquan) 25 mg capsule Take 1 capsule by mouth daily at bedtime, Starting Tue 10/10/2017, No Print      gabapentin (NEURONTIN) 300 mg capsule Take 1 capsule by mouth 3 (three) times a day, Starting Tue 10/10/2017, No Print      levothyroxine 137 mcg tablet Take 1 tablet by mouth daily, Starting Tue 7/18/2017, Print      nicotine (NICODERM CQ) 14 mg/24hr TD 24 hr patch Place 1 patch on the skin daily, Starting Wed 7/18/2018, Normal      PARoxetine (PAXIL) 20 mg tablet Take 1 tablet by mouth daily, Starting Wed 10/11/2017, No Print      potassium chloride (K-DUR,KLOR-CON) 10 mEq tablet Take 1 tablet (10 mEq total) by mouth 2 (two) times a day, Starting Tue 7/17/2018, Normal           No discharge procedures on file      ED Provider  Electronically Signed by           Dwayne Tubbs MD  09/21/18 5834

## 2018-09-26 ENCOUNTER — HOSPITAL ENCOUNTER (EMERGENCY)
Facility: HOSPITAL | Age: 38
End: 2018-09-27
Attending: EMERGENCY MEDICINE | Admitting: EMERGENCY MEDICINE
Payer: COMMERCIAL

## 2018-09-26 DIAGNOSIS — F29 PSYCHOSIS (HCC): Primary | ICD-10-CM

## 2018-09-26 LAB
ETHANOL EXG-MCNC: 0 MG/DL
EXT PREG TEST URINE: NEGATIVE

## 2018-09-26 PROCEDURE — 99285 EMERGENCY DEPT VISIT HI MDM: CPT

## 2018-09-26 PROCEDURE — 81025 URINE PREGNANCY TEST: CPT | Performed by: EMERGENCY MEDICINE

## 2018-09-26 PROCEDURE — 82075 ASSAY OF BREATH ETHANOL: CPT | Performed by: EMERGENCY MEDICINE

## 2018-09-26 PROCEDURE — 96372 THER/PROPH/DIAG INJ SC/IM: CPT

## 2018-09-26 RX ORDER — LORAZEPAM 1 MG/1
1 TABLET ORAL ONCE
Status: COMPLETED | OUTPATIENT
Start: 2018-09-26 | End: 2018-09-26

## 2018-09-26 RX ORDER — LORAZEPAM 1 MG/1
TABLET ORAL
Status: DISPENSED
Start: 2018-09-26 | End: 2018-09-27

## 2018-09-26 RX ORDER — OLANZAPINE 10 MG/1
10 INJECTION, POWDER, LYOPHILIZED, FOR SOLUTION INTRAMUSCULAR ONCE
Status: COMPLETED | OUTPATIENT
Start: 2018-09-26 | End: 2018-09-26

## 2018-09-26 RX ORDER — OLANZAPINE 10 MG/1
INJECTION, POWDER, LYOPHILIZED, FOR SOLUTION INTRAMUSCULAR
Status: DISPENSED
Start: 2018-09-26 | End: 2018-09-27

## 2018-09-26 RX ADMIN — LORAZEPAM 1 MG: 1 TABLET ORAL at 16:07

## 2018-09-26 RX ADMIN — WATER 10 ML: 1 INJECTION INTRAMUSCULAR; INTRAVENOUS; SUBCUTANEOUS at 16:07

## 2018-09-26 RX ADMIN — OLANZAPINE 10 MG: 10 INJECTION, POWDER, FOR SOLUTION INTRAMUSCULAR at 16:07

## 2018-09-26 NOTE — ED NOTES
Chart faxed to [facility] for review  [facility] fax[facility] to [facility]      CHRIST Dhillon  09/26/18   0151

## 2018-09-26 NOTE — ED NOTES
Pt was medication by Nurse SUNNY with the assistance of Nurse ANN,  and ED Tech  Pt currently appears to be sitting on the ground near her bed  No distress noted  Will continue to monitor        Yadira Chi  09/26/18 7803

## 2018-09-26 NOTE — ED NOTES
Pt appears to be resting at this time  No distress noted  Will continue to monitor          Pasquale Chaney  09/26/18 7819

## 2018-09-26 NOTE — ED NOTES
Report received from ED Tech NL  Pt seems to be talking to her self  She appears to be sitting at the edge of her bed facing the TV  No distress noted  Will continue to monitor        Ivan Molina  09/26/18 6414

## 2018-09-26 NOTE — ED NOTES
Pt is sitting on bed with socks on her hands  Pt is talking to herself  No signs of distress  Will continue to monitor        Everett Severance  09/26/18 5927

## 2018-09-26 NOTE — ED NOTES
Pt is laying on the bed, quacking like a duck  Will continue to monitor        Corin Lee  09/26/18 3346
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
## 2018-09-26 NOTE — ED NOTES
Pt is aggravated and paranoid  She is out of her room yelling at the nursing station window  She is making statements like, " That charge nurse (pointing at the [facility]) is giggling about my miscarriage " In addition pt, has been going into [facility] even though she was told not to  Security and charge nurse notified        Ivan Molina  09/26/18 6001

## 2018-09-26 NOTE — ED NOTES
Received report from [facility]  I introduced myself to the patient and took her vital signs  Patient was calm and cooperative  Patient appears to be asleep on the bed with the lights on  Will continue to monitor        Chidi Messina  09/26/18 1947

## 2018-09-26 NOTE — ED NOTES
Pt appears to be resting at this time  No distress noted  Will continue to monitor          Lei Watt  09/26/18 2509

## 2018-09-26 NOTE — ED NOTES
Call received from [facility] stating they are unable to accept pt at this time due to her medical needs       CHRIST Beckham  09/26/18   9722
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
## 2018-09-26 NOTE — ED PROVIDER NOTES
History  Chief Complaint   Patient presents with    Psychiatric Evaluation     pt brought here on a 36 from counselors  Pt has not been taking care of herself or taking medication      Patient is a 43-year-old female with past medical history of hypothyroidism, moyamoya disease with history of prior CVA status post brain surgery, hypertension, significant psychiatric history of psychosis, presents to the emergency department, escorted by police under a 635  According to police as well as [facility], patient has been noncompliant with both her psychiatric and medical medications  They note that she has not been taking care of herself  Patient is homeless and has appeared very disheveled and has had significant poor hygiene and malodor consistent with urine  Both [facility] and police feel that she needs psychiatric treatment and stabilization  When speaking with patient, she is visibly upset  She is tangential and cannot follow a conversation  She does not know why she is here and thinks it is in the middle of the night  She also reports that she recently had a miscarriage which is causing her much distress however according to prior records, there has been no recent documented pregnancy  Patient does feel suicidal but will not tell me if she has a plan or not  She also reports homicidal ideation however once again will not tell me towards to or if she has a plan  She denies any hallucinations currently  She admits that she is living in a tent  She denies any allegations that she is not taking her medications  She has no physical complaints at this time  History provided by:  Patient and police   used: No    Psychiatric Evaluation   Presenting symptoms: suicidal thoughts    Associated symptoms: no abdominal pain, no chest pain and no headaches        Prior to Admission Medications   Prescriptions Last Dose Informant Patient Reported? Taking?    PARoxetine (PAXIL) 20 mg tablet   No No   Sig: Take 1 tablet by mouth daily   aspirin 81 mg chewable tablet   No No   Sig: Chew 4 tablets (324 mg total) daily   atorvastatin (LIPITOR) 40 mg tablet   No No   Sig: Take 1 tablet (40 mg total) by mouth every evening   clonazePAM (KlonoPIN) 0 5 mg tablet   Yes No   Sig: Take 0 5 mg by mouth 2 (two) times a day as needed     doxepin (SINEquan) 25 mg capsule   No No   Sig: Take 1 capsule by mouth daily at bedtime   Patient taking differently: Take 25 mg by mouth daily at bedtime as needed     gabapentin (NEURONTIN) 300 mg capsule   No No   Sig: Take 1 capsule by mouth 3 (three) times a day   levothyroxine 137 mcg tablet   No No   Sig: Take 1 tablet by mouth daily   nicotine (NICODERM CQ) 14 mg/24hr TD 24 hr patch   No No   Sig: Place 1 patch on the skin daily   potassium chloride (K-DUR,KLOR-CON) 10 mEq tablet   No No   Sig: Take 1 tablet (10 mEq total) by mouth 2 (two) times a day      Facility-Administered Medications: None       Past Medical History:   Diagnosis Date    Disease of thyroid gland     History of CVA (cerebrovascular accident)     Hypertension     Moyamoya disease     Psychiatric disorder     Stroke ([facility] [address] )     Tobacco use        Past Surgical History:   Procedure Laterality Date    BRAIN SURGERY      BRAIN SURGERY  04/10/2017    moyamoya       Family History   Problem Relation Age of Onset    Depression Mother     Heart disease Father     Hypertension Father     Diabetes Father     Breast cancer Maternal Grandmother      I have reviewed and agree with the history as documented  Social History   Substance Use Topics    Smoking status: Current Some Day Smoker     Packs/day: 0 50     Types: Cigarettes    Smokeless tobacco: Never Used    Alcohol use No        Review of Systems   Unable to perform ROS: Psychiatric disorder (ROS unreliable)   Constitutional: Negative for chills and fever     HENT: Negative for congestion, ear pain, rhinorrhea and sore throat  Eyes: Negative for pain and visual disturbance  Respiratory: Negative for cough, chest tightness, shortness of breath and wheezing  Cardiovascular: Negative for chest pain and palpitations  Gastrointestinal: Negative for abdominal pain, constipation, diarrhea, nausea and vomiting  Genitourinary: Negative for dysuria, flank pain, frequency and hematuria  Musculoskeletal: Negative for back pain and neck pain  Skin: Negative for color change, pallor and rash  Allergic/Immunologic: Negative for immunocompromised state  Neurological: Negative for dizziness, weakness, light-headedness, numbness and headaches  Hematological: Negative for adenopathy  Psychiatric/Behavioral: Positive for dysphoric mood and suicidal ideas  Negative for confusion         + Delusions  All other systems reviewed and are negative  Physical Exam  Physical Exam   Constitutional: She is oriented to person, place, and time  She appears well-developed and well-nourished  No distress  Very poor hygiene  Patient appears disheveled  Patient and room has significant malodor consistent with urine  HENT:   Head: Normocephalic and atraumatic  Mouth/Throat: Oropharynx is clear and moist    Eyes: Conjunctivae and EOM are normal  Pupils are equal, round, and reactive to light  Neck: Normal range of motion  Neck supple  No JVD present  Cardiovascular: Normal rate, regular rhythm, normal heart sounds and intact distal pulses  Exam reveals no gallop and no friction rub  No murmur heard  Pulmonary/Chest: Effort normal and breath sounds normal  No respiratory distress  She has no wheezes  She has no rales  Abdominal: Soft  She exhibits no distension  There is no tenderness  There is no rebound and no guarding  Musculoskeletal: Normal range of motion  She exhibits no edema or tenderness  Neurological: She is alert and oriented to person, place, and time  No gross motor or sensory deficits     Skin: Skin is warm and dry  No rash noted  She is not diaphoretic  No pallor  Psychiatric: Her affect is angry, blunt and labile  Her speech is tangential  She is agitated  Thought content is paranoid and delusional  She exhibits a depressed mood  She expresses homicidal and suicidal ideation  She expresses no suicidal plans and no homicidal plans  She is inattentive  Nursing note and vitals reviewed        Vital Signs  ED Triage Vitals   Temperature Pulse Respirations Blood Pressure SpO2   09/26/18 1512 09/26/18 1512 09/26/18 1512 09/26/18 1512 09/26/18 1512   99 5 °F (37 5 °C) 96 16 (!) 174/97 100 %      Temp Source Heart Rate Source Patient Position - Orthostatic VS BP Location FiO2 (%)   09/26/18 1512 09/26/18 1512 09/26/18 1913 09/26/18 1512 --   Oral Monitor Lying Right arm       Pain Score       09/26/18 1512       No Pain         Vitals:    09/27/18 0103 09/27/18 0513 09/27/18 0842 09/27/18 1639   BP: 115/69 119/66 141/93 129/90   BP Location: Right arm Left arm Right arm Right arm   Pulse: 79 96 (!) 112 (!) 106   Resp: 18 18 18   Temp: 98 2 °F (36 8 °C)      TempSrc: Oral      SpO2: 100% 98% 96% 98%   Weight:       Height:         Visual Acuity      ED Medications  Medications   levothyroxine tablet 150 mcg (150 mcg Oral Given 9/27/18 0925)   gabapentin (NEURONTIN) capsule 300 mg (300 mg Oral Given 9/27/18 1630)   PARoxetine (PAXIL) tablet 20 mg (20 mg Oral Given 9/27/18 0925)   clonazePAM (KlonoPIN) tablet 0 5 mg (0 5 mg Oral Given 9/27/18 0925)   atorvastatin (LIPITOR) tablet 40 mg (40 mg Oral Given 9/27/18 1629)   nicotine (NICODERM CQ) 21 mg/24 hr TD 24 hr patch 21 mg (21 mg Transdermal Medication Applied 9/27/18 0925)   OLANZapine (ZyPREXA) IM injection 10 mg (10 mg Intramuscular Given 9/26/18 1607)   LORazepam (ATIVAN) tablet 1 mg (1 mg Oral Given 9/26/18 1607)   sterile water injection **AcuDose Override Pull** (10 mL  Given 9/26/18 1607)   ziprasidone (GEODON) IM injection 20 mg (20 mg Intramuscular Given 18 0922)   sterile water injection 10 mL (10 mL Injection Given 18 0923)   aspirin tablet 325 mg (325 mg Oral Given 18 1630)       Diagnostic Studies  Results Reviewed     Procedure Component Value Units Date/Time    Rapid drug screen, urine [[id number]]  (Normal) Collected:  18 0107    Lab Status:  Final result Specimen:  Urine from Urine, Clean Catch Updated:  18 0336     Amph/Meth UR Negative     Barbiturate Ur Negative     Benzodiazepine Urine Negative     Cocaine Urine Negative     Methadone Urine Negative     Opiate Urine Negative     PCP Ur Negative     THC Urine Negative    Narrative:         FOR MEDICAL PURPOSES ONLY  IF CONFIRMATION NEEDED PLEASE CONTACT THE LAB WITHIN 5 DAYS  Drug Screen Cutoff Levels:  AMPHETAMINE/METHAMPHETAMINES  1000 ng/mL  BARBITURATES     200 ng/mL  BENZODIAZEPINES     200 ng/mL  COCAINE      300 ng/mL  METHADONE      300 ng/mL  OPIATES      300 ng/mL  PHENCYCLIDINE     25 ng/mL  THC       50 ng/mL    POCT alcohol breath test [[id number]]  (Normal) Resulted:  18 152    Lab Status:  Final result Updated:  18 152     EXTBreath Alcohol 0 000    POCT pregnancy, urine [[id number]]  (Normal) Resulted:  18 1521    Lab Status:  Final result Specimen:  Urine Updated:  18 152     EXT PREG TEST UR (Ref: Negative) Negative                 No orders to display              Procedures  Procedures       Phone Contacts  ED Phone Contact    ED Course  ED Course as of Sep 27 1842   Wed Sep 26, 2018   1408 According to medical records, patient did have confirmed spontaneous  / miscarriage in 2018 as evidenced by OB/GYN notes/US/pelvic exam findings  This negates her "delusion of miscarriage" however I still feel she is in need of psychiatric evaluation/stabilization  Jagdish with ED crisis worker, Zen Lee, after she evaluated patient and was also having a difficult time having patient answer questions appropriately    She seems very tangential  She is still displaying emotional lability  She knows that her  sent her in but unsure why  She does not want to sign in voluntarily so I am forced to uphold 302 as I feel patient is unstable from a psychiatric standpoint  Patient medically cleared  1601 Patient becoming more agitated  Will give Ativan and Zyprexa for anxiolysis/mild sedation  1652 302 filled out  1711 Signed patient out to Dr Chuyita Ayers [facility]) who will f/u with crisis regarding bed search  Thu Sep 27, 2018   0620 Resumed care of patient on 9/27 at [address] [place] [address] Dr initially denied patient as they do not feel she is medically stable given her comorbidities  Explained to crisis worker that patient is not having any medical problems at this time and although she does have chronic medical conditions that are not being addressed, this is not mandating medical admission  Paged psychiatrist from [facility], Dr Colletta Chesterfield and awaiting call back to discuss this patient  1150 With able to review prior recent records while speaking with Dr Colletta Chesterfield, and patient did have subacute stroke in August and was admitted for that  According to discharge summary, she was supposed to be taking Lipitor 40 mg and aspirin 325 mg  Dr Colletta Chesterfield recommended we give her the aspirin today and he will get back to me about dispo  Once again, I feel she needs psychiatric stabilization in order her for her to be compliant with her medical regimen and follow ups  MDM  Number of Diagnoses or Management Options  Diagnosis management comments: 26-year-old female with history of psychiatric disease, presents to the emergency department under a 302 for abnormal behavior, medication noncompliance and not taking care of herself  Patient does exhibit active psychosis with possible delusions and does admit to suicidal ideation    At this point I would agree with assessment to have patient admitted for psychiatric evaluation and treatment  If she is unwilling to sign 201 for voluntary admission, will uphold 302  Will check UDS, urine pregnancy and BAT         Amount and/or Complexity of Data Reviewed  Clinical lab tests: ordered and reviewed  Obtain history from someone other than the patient: yes  Discuss the patient with other providers: yes      CritCare Time    Disposition  Final diagnoses:   Psychosis ([address] )     Time reflects when diagnosis was documented in both MDM as applicable and the Disposition within this note     Time User Action Codes Description Comment    9/27/2018  3:03 PM Ace Jett [F29] Psychosis [facility])       ED Disposition     ED Disposition Condition Comment    Transfer to Another [address] [place] [address] should be transferred out to [facility] under care of Dr Atanacio Sicard MD Documentation      Most Recent Value   Patient Condition  The patient has been stabilized such that within reasonable medical probability, no material deterioration of the patient condition or the condition of the unborn child(narinder) is likely to result from the transfer   Reason for Transfer  Level of Care needed not available at this facility   Benefits of Transfer  Other benefits (Include comment)_______________________ Julia An[facility] Psych Tx]   Risks of Transfer  Potential deterioration of medical condition   Accepting Physician  Dr Zac Kuo Name, [address]    (Name & Tel number)  CHRIST Stratton   Transported by [facility] and Unit #)  [facility] [phone]   Sending MD Dr Tee Palma   Provider Certification  The patient is stable for psychiatric transfer because they are medically stable, and is protected from harming him/herself or others during transport      RN Documentation      Most Recent Value   [address] Name, [address]    (Name & Tel number)  CHRIST Stratton   Transport Mode Ambulance   Transported by [place] and Unit #)  [facility] [phone]   Level of Care  Basic life support   Transfer Date  09/27/18   Transfer Time  1930      Follow-up Information    None         Patient's Medications   Discharge Prescriptions    No medications on file     No discharge procedures on file      ED Provider  Electronically Signed by           Shikha Cooper DO  09/27/18 4618

## 2018-09-26 NOTE — ED NOTES
Pt belongings in locker FC00      Shirt, Sweater, socks, boxers, sweat pants, $5 in cash, 2 flashlights, cell phone, access card, 2 necklaces     [facility]  09/26/18 3182

## 2018-09-26 NOTE — LETTER
[address]  [facility] [address]  Dept: [phone]                                                                 WVUZNF TRANSFER CONSENT    NAME Enolia Canavan                        1980                              MRN [id number]    I have been informed of my rights regarding examination, treatment, and transfer   by Dr Serapio Schilder, DO    Benefits: Other benefits (Include comment)_______________________ (Inpatient Psych Tx)    Risks: Potential deterioration of medical condition    Consent for Transfer:  I acknowledge that my medical condition has been evaluated and explained to me by the emergency department physician or other qualified medical person and/or my attending physician, who has recommended that I be transferred to the service of  Accepting Physician: Dr Freddy Cooper at [address] Name, [address] : [place] [facility] [place]  The above potential benefits of such transfer, the potential risks associated with such transfer, and the probable risks of not being transferred have been explained to me, and I fully understand them  The doctor has explained that, in my case, the benefits of transfer outweigh the risks  I agree to be transferred  I authorize the performance of emergency medical procedures and treatments upon me in both transit and upon arrival at the receiving facility  Additionally, I authorize the release of any and all medical records to the receiving facility and request they be transported with me, if possible  I understand that the safest mode of transportation during a medical emergency is an ambulance and that the Hospital advocates the use of this mode of transport  Risks of traveling to the receiving facility by car, including absence of medical control, life sustaining equipment, such as oxygen, and medical personnel has been explained to me and I fully understand them      (LORENZA CORRECT BOX BELOW)  [X]  I consent to the stated transfer and to be transported by ambulance/helicopter  [  ]  I consent to the stated transfer, but refuse transportation by ambulance and accept full responsibility for my transportation by car  I understand the risks of non-ambulance transfers and I exonerate the Hospital and its staff from any deterioration in my condition that results from this refusal     X___________________________________________    DATE  18  TIME________  Signature of patient or legally responsible individual signing on patient behalf           RELATIONSHIP TO PATIENT_________________________                            Provider Certification    NAME Tomy Maravilla                        1980                              MRN [id number]    A medical screening exam was performed on the above named patient  Based on the examination:    Condition Necessitating Transfer Psychosis  Patient Condition: The patient has been stabilized such that within reasonable medical probability, no material deterioration of the patient condition or the condition of the unborn child(narinder) is likely to result from the transfer    Reason for Transfer: Level of Care needed not available at this facility    Transfer Requirements: [address] [place], [place]  · Space available and qualified personnel available for treatment as acknowledged by CHRIST Hurtado  · Agreed to accept transfer and to provide appropriate medical treatment as acknowledged by       Dr Jericho Porter  · Appropriate medical records of the examination and treatment of the patient are provided at the time of transfer   [facility], [address] ___JG____  · Transfer will be performed by qualified personnel from [facility] [phone]  and appropriate transfer equipment as required, including the use of necessary and appropriate life support measures      Provider Certification: I have examined the patient and explained the following risks and benefits of being transferred/refusing transfer to the patient/family:  The patient is stable for psychiatric transfer because they are medically stable, and is protected from harming him/herself or others during transport      Based on these reasonable risks and benefits to the patient and/or the unborn child(narinder), and based upon the information available at the time of the patients examination, I certify that the medical benefits reasonably to be expected from the provision of appropriate medical treatments at another medical facility outweigh the increasing risks, if any, to the individuals medical condition, and in the case of labor to the unborn child, from effecting the transfer      X____________________________________________ DATE 09/27/18        TIME_______      ORIGINAL - SEND TO MEDICAL RECORDS   COPY - SEND WITH PATIENT DURING TRANSFER

## 2018-09-27 ENCOUNTER — HOSPITAL ENCOUNTER (INPATIENT)
Facility: HOSPITAL | Age: 38
LOS: 15 days | Discharge: HOME | DRG: 751 | End: 2018-10-12
Attending: PSYCHIATRY & NEUROLOGY | Admitting: PSYCHIATRY & NEUROLOGY
Payer: COMMERCIAL

## 2018-09-27 VITALS
HEIGHT: 61 IN | WEIGHT: 160 LBS | BODY MASS INDEX: 30.21 KG/M2 | TEMPERATURE: 98.2 F | DIASTOLIC BLOOD PRESSURE: 99 MMHG | HEART RATE: 112 BPM | RESPIRATION RATE: 16 BRPM | OXYGEN SATURATION: 96 % | SYSTOLIC BLOOD PRESSURE: 147 MMHG

## 2018-09-27 DIAGNOSIS — F32.3 MAJOR DEPRESSION WITH PSYCHOTIC FEATURES (HCC): ICD-10-CM

## 2018-09-27 DIAGNOSIS — Z86.73 HISTORY OF CVA (CEREBROVASCULAR ACCIDENT): Primary | ICD-10-CM

## 2018-09-27 LAB
AMPHETAMINES SERPL QL SCN: NEGATIVE
BARBITURATES UR QL: NEGATIVE
BENZODIAZ UR QL: NEGATIVE
COCAINE UR QL: NEGATIVE
METHADONE UR QL: NEGATIVE
OPIATES UR QL SCN: NEGATIVE
PCP UR QL: NEGATIVE
THC UR QL: NEGATIVE

## 2018-09-27 PROCEDURE — 99253 IP/OBS CNSLTJ NEW/EST LOW 45: CPT | Performed by: FAMILY MEDICINE

## 2018-09-27 PROCEDURE — 80307 DRUG TEST PRSMV CHEM ANLYZR: CPT | Performed by: EMERGENCY MEDICINE

## 2018-09-27 PROCEDURE — 96372 THER/PROPH/DIAG INJ SC/IM: CPT

## 2018-09-27 RX ORDER — ZIPRASIDONE MESYLATE 20 MG/ML
20 INJECTION, POWDER, LYOPHILIZED, FOR SOLUTION INTRAMUSCULAR ONCE
Status: COMPLETED | OUTPATIENT
Start: 2018-09-27 | End: 2018-09-27

## 2018-09-27 RX ORDER — ATORVASTATIN CALCIUM 40 MG/1
40 TABLET, FILM COATED ORAL
Status: DISCONTINUED | OUTPATIENT
Start: 2018-09-27 | End: 2018-09-27 | Stop reason: HOSPADM

## 2018-09-27 RX ORDER — OLANZAPINE 5 MG/1
5 TABLET ORAL EVERY 4 HOURS PRN
Status: DISCONTINUED | OUTPATIENT
Start: 2018-09-27 | End: 2018-10-12 | Stop reason: HOSPADM

## 2018-09-27 RX ORDER — ASPIRIN 325 MG
325 TABLET ORAL ONCE
Status: COMPLETED | OUTPATIENT
Start: 2018-09-27 | End: 2018-09-27

## 2018-09-27 RX ORDER — TRAZODONE HYDROCHLORIDE 50 MG/1
50 TABLET ORAL
Status: DISCONTINUED | OUTPATIENT
Start: 2018-09-27 | End: 2018-10-12 | Stop reason: HOSPADM

## 2018-09-27 RX ORDER — ATORVASTATIN CALCIUM 40 MG/1
40 TABLET, FILM COATED ORAL EVERY EVENING
Status: DISCONTINUED | OUTPATIENT
Start: 2018-09-27 | End: 2018-10-12 | Stop reason: HOSPADM

## 2018-09-27 RX ORDER — LORAZEPAM 0.5 MG/1
0.5 TABLET ORAL EVERY 4 HOURS PRN
Status: DISCONTINUED | OUTPATIENT
Start: 2018-09-27 | End: 2018-10-12 | Stop reason: HOSPADM

## 2018-09-27 RX ORDER — GABAPENTIN 300 MG/1
300 CAPSULE ORAL 3 TIMES DAILY
Status: DISCONTINUED | OUTPATIENT
Start: 2018-09-27 | End: 2018-10-12 | Stop reason: HOSPADM

## 2018-09-27 RX ORDER — CLONAZEPAM 0.5 MG/1
0.5 TABLET ORAL 2 TIMES DAILY
Status: DISCONTINUED | OUTPATIENT
Start: 2018-09-27 | End: 2018-09-27 | Stop reason: HOSPADM

## 2018-09-27 RX ORDER — NICOTINE 21 MG/24HR
21 PATCH, TRANSDERMAL 24 HOURS TRANSDERMAL DAILY
Status: DISCONTINUED | OUTPATIENT
Start: 2018-09-27 | End: 2018-09-27 | Stop reason: HOSPADM

## 2018-09-27 RX ORDER — NICOTINE 21 MG/24HR
21 PATCH, TRANSDERMAL 24 HOURS TRANSDERMAL ONCE
Status: DISCONTINUED | OUTPATIENT
Start: 2018-09-27 | End: 2018-09-27 | Stop reason: HOSPADM

## 2018-09-27 RX ORDER — NICOTINE 21 MG/24HR
1 PATCH, TRANSDERMAL 24 HOURS TRANSDERMAL DAILY
Status: DISCONTINUED | OUTPATIENT
Start: 2018-09-28 | End: 2018-10-12 | Stop reason: HOSPADM

## 2018-09-27 RX ORDER — LORAZEPAM 2 MG/ML
0.5 INJECTION INTRAMUSCULAR EVERY 4 HOURS PRN
Status: DISCONTINUED | OUTPATIENT
Start: 2018-09-27 | End: 2018-10-12 | Stop reason: HOSPADM

## 2018-09-27 RX ORDER — GABAPENTIN 300 MG/1
300 CAPSULE ORAL 3 TIMES DAILY
Status: DISCONTINUED | OUTPATIENT
Start: 2018-09-27 | End: 2018-09-27 | Stop reason: HOSPADM

## 2018-09-27 RX ORDER — LEVOTHYROXINE SODIUM 0.15 MG/1
150 TABLET ORAL
Status: DISCONTINUED | OUTPATIENT
Start: 2018-09-27 | End: 2018-09-27 | Stop reason: HOSPADM

## 2018-09-27 RX ORDER — ASPIRIN 81 MG/1
324 TABLET, CHEWABLE ORAL DAILY
Status: DISCONTINUED | OUTPATIENT
Start: 2018-09-28 | End: 2018-09-28

## 2018-09-27 RX ORDER — OLANZAPINE 10 MG/1
5 INJECTION, POWDER, LYOPHILIZED, FOR SOLUTION INTRAMUSCULAR EVERY 4 HOURS PRN
Status: DISCONTINUED | OUTPATIENT
Start: 2018-09-27 | End: 2018-10-12 | Stop reason: HOSPADM

## 2018-09-27 RX ORDER — PAROXETINE 10 MG/1
20 TABLET, FILM COATED ORAL DAILY
Status: DISCONTINUED | OUTPATIENT
Start: 2018-09-27 | End: 2018-09-27 | Stop reason: HOSPADM

## 2018-09-27 RX ORDER — DIPHENHYDRAMINE HYDROCHLORIDE 50 MG/ML
50 INJECTION INTRAMUSCULAR; INTRAVENOUS EVERY 4 HOURS PRN
Status: DISCONTINUED | OUTPATIENT
Start: 2018-09-27 | End: 2018-10-12 | Stop reason: HOSPADM

## 2018-09-27 RX ORDER — DOXEPIN HYDROCHLORIDE 25 MG/1
25 CAPSULE ORAL
Status: DISCONTINUED | OUTPATIENT
Start: 2018-09-27 | End: 2018-10-12 | Stop reason: HOSPADM

## 2018-09-27 RX ORDER — LANOLIN ALCOHOL/MO/W.PET/CERES
6 CREAM (GRAM) TOPICAL
Status: DISCONTINUED | OUTPATIENT
Start: 2018-09-27 | End: 2018-10-12 | Stop reason: HOSPADM

## 2018-09-27 RX ADMIN — Medication 10 ML: at 09:23

## 2018-09-27 RX ADMIN — CLONAZEPAM 0.5 MG: 0.5 TABLET ORAL at 19:18

## 2018-09-27 RX ADMIN — WATER 10 ML: 1 INJECTION INTRAMUSCULAR; INTRAVENOUS; SUBCUTANEOUS at 09:23

## 2018-09-27 RX ADMIN — CLONAZEPAM 0.5 MG: 0.5 TABLET ORAL at 09:25

## 2018-09-27 RX ADMIN — ZIPRASIDONE MESYLATE 20 MG: 20 INJECTION, POWDER, LYOPHILIZED, FOR SOLUTION INTRAMUSCULAR at 09:22

## 2018-09-27 RX ADMIN — LEVOTHYROXINE SODIUM 150 MCG: 150 TABLET ORAL at 09:25

## 2018-09-27 RX ADMIN — PAROXETINE 20 MG: 10 TABLET, FILM COATED ORAL at 09:25

## 2018-09-27 RX ADMIN — ASPIRIN 325 MG: 325 TABLET ORAL at 16:30

## 2018-09-27 RX ADMIN — NICOTINE 21 MG: 21 PATCH TRANSDERMAL at 09:25

## 2018-09-27 RX ADMIN — ATORVASTATIN CALCIUM 40 MG: 40 TABLET, FILM COATED ORAL at 16:29

## 2018-09-27 RX ADMIN — GABAPENTIN 300 MG: 300 CAPSULE ORAL at 16:30

## 2018-09-27 RX ADMIN — GABAPENTIN 300 MG: 300 CAPSULE ORAL at 09:25

## 2018-09-27 RX ADMIN — GABAPENTIN 300 MG: 300 CAPSULE ORAL at 22:15

## 2018-09-27 RX ADMIN — DOXEPIN HYDROCHLORIDE 25 MG: 25 CAPSULE ORAL at 22:15

## 2018-09-27 NOTE — ED NOTES
Matilde Claros at [facility] called to provide Auth# [id number]      Claudette Nino, [facility]  09/27/18  9298

## 2018-09-27 NOTE — ED NOTES
Pt is laying on bed  Pt appears to be sleeping  Pt calm and cooperative   No s/s of distress noted     JUAN C Foreman  09/27/18 5158

## 2018-09-27 NOTE — ED NOTES
DR cielo MCLAUGHLIN completed between Dr Cely Wolfe ([facility]) and Dr Chele Beard (-)  Per Dr Chele Beard, the pt is to receive a dose of aspirin, and he will discuss and call back with a decision       Angelica Chavez LMSW  09/27/18  9066

## 2018-09-27 NOTE — ED NOTES
Received a call from [employer] at [facility] who indicated that their RN Manager does not feel the pt is medically stable at this time due to "the possible need of a blood thinner to treat her calderon calderon disease"  Spoke with ED physician, Dr Tesha Jernigan, who indicated that the pt is medically stable and that the pt is showing no signs of needing to be placed on such medication at this specific time  Per Dr Tesha Jernigan, the pt is in need of acute psychiatric treatment to stabilize  Call placed to , Gianni Valdovinos, to discuss as bed availability is limited in [facility]       Alex Arthur, [facility]  09/27/18  4696

## 2018-09-27 NOTE — ED NOTES
Pt was woken up to obtain vital signs  Pt vitals were obtained  Pt asked what time it was  Pt was told 0500  Pt asked about breakfast again and was told breakfast could be ordered in 2 hours  Pt is laying on bed  Pt is calm and cooperative  No s/s of distress noted        Ida Aragon  09/27/18 0460

## 2018-09-27 NOTE — ED NOTES
Gave pt basin with stuff to freshen up with and a new pair of scrubs        [facility]  09/27/18 7861

## 2018-09-27 NOTE — ED NOTES
Pt is laying in bed watching tv  No signs of distress noted at this time  Will continue to monitor  Will assess vitals when RN arrives with damon Serrato  09/27/18 6440

## 2018-09-27 NOTE — ED NOTES
Pt care was transferred to [address] [place] [address]  Pt appears to be sleeping  Pt is calm and cooperative right now   No s/s of distress noted at this time     JUAN C Rai Settles  09/26/18 1820

## 2018-09-27 NOTE — ED NOTES
Took over observation from Johnnie HERNANDEZ  Pt appears to be asleep in bed with the lights off and tv on  No signs of distress noted at this time  Will continue to monitor        Micheline Willis  09/27/18 2432

## 2018-09-27 NOTE — ED NOTES
Pt is laying on bed and appears to be sleeping  Pt is calm and cooperative  No s/s of distress noted   Will continue to monitor     JUAN C Faustin Heater  09/27/18 0129

## 2018-09-27 NOTE — ED NOTES
Pt stated they had to pee  Pt was asked if they would pee in the cup to get urine  Pt stated they would pee in the cup  Pt is cold and asked for blankets   More blankets were provided     JUAN C Cordova  09/27/18 5717

## 2018-09-27 NOTE — ED NOTES
Pt randomly started saying "I heard you call me a fucking bitch, my sister is an LPN and is the charge nurse of me and will get you fired"  Both myself and crisis sitting in secured holding boxed area unaware of what pt is speaking about  Charged made aware        [facility]  09/27/18 7583

## 2018-09-27 NOTE — ED NOTES
Per Janeice Felty at [facility], they will have their nursing staff review clinical      Elizabethhamilton Whalen LMSW  09/27/18  6091

## 2018-09-27 NOTE — ED NOTES
Insurance Authorization:   Phone call placed to [facility]  Phone number: [phone]  Spoke to [employer]  5 days approved  Level of care: 302  Review on 10/01/2018  Authorization # to be determined once accepting facility is located      CHRIST Holland  09/26/18   3024

## 2018-09-27 NOTE — ED NOTES
1 to 1 taken over, report rcvd from Denise Yuen  Pt appears to be resting, no signs of distress  Will continue to monitor  Martin from security is present in room with patient returning her belongings from the security office       Bethany Marie  09/27/18 1910

## 2018-09-27 NOTE — ED NOTES
Patient is accepted at [facility]  Patient is accepted by Dr Christine Menjivar in crisis  Transportation is arranged with Leeanna Kim at [facility]  Transportation is scheduled for 730pm        Nurse report is to be called to [phone] prior to patient transfer      Deloria Lefort, LMSW  09/27/18  2288

## 2018-09-27 NOTE — ED NOTES
Pt allowed for vitals to be taken and meds to be give  Pt ordered dinner, and requested her glasses  Pt told that her belongings would be searched for her glasses as there is no mention of her coming to the ED with them        Shreyas Bailey  09/27/18 9405

## 2018-09-27 NOTE — ED NOTES
Call placed to pt's TCM, Kayce Hayward to gather additional information  [facility] was unavailable, however a message was left requesting a return call       CHRIST Sands  09/26/18 2059

## 2018-09-27 NOTE — ED NOTES
Pt walked to restroom with assistance  Pt stated they needed help due to "knee being cracked " Once pt was in restroom pt peed in urine cup  Urine cup was sent to lab @ 0107  Pt laid down on bed when back in room  Pt blankets were put on pt  Pt complained of being cold  Pt also requested breakfast  Pt was told breakfast can't be ordered until 0700  Pt asked if there were any diet restrictions  Pt was told no  Pt stated they wanted to order an omelette for breakfast @ 0700        Ellie Richmond  09/27/18 8533

## 2018-09-27 NOTE — ED NOTES
Call placed to security about belongings  Spoke with Martin Ng, he will check and let me know       Ryanne Marie  09/27/18 5539

## 2018-09-27 NOTE — ED NOTES
Call received from pt's TCM, Lázaro Young, from [address] [place] [address]  Lázaro Young would like to know where the pt is placed, at [phone] Ext  [phone]      Kay Murillo LMSW  09/27/18  0270

## 2018-09-27 NOTE — ED NOTES
Received report from [employer] YAZAN  Pt appears to be sleeping with no signs of distress  Will continue to monitor        [facility]  09/27/18 3871

## 2018-09-27 NOTE — ED NOTES
Pt food tray delivered  Pt requesting a knee brace "because I cracked my knee in an accident" and a nicotine patch  Pt is sitting in the bedside chair, eating        Ganga York  09/27/18 7864

## 2018-09-27 NOTE — ED NOTES
Pt appears to be sleeping, no signs of distress  Will continue to monitor        Alexislaura Florencio  09/27/18 2923

## 2018-09-27 NOTE — ED NOTES
Pt is laying in bed  Pt appears to be sleeping  Pt is calm and cooperative  No s/s of distress noted  Will continue to monitor       Ellie Richmond  09/27/18 1514

## 2018-09-27 NOTE — ED NOTES
Completed 302 faxed to [facility] at [facility]      302, ACT 77, CRF and Rights emailed to [employer] at [facility]  09/27/18  2375

## 2018-09-27 NOTE — ED NOTES
Pt appears to be sleeping with no signs of distress  Will continue to monitor        [facility]  09/27/18 5157

## 2018-09-27 NOTE — ED NOTES
Pt appears to be sleeping  Pt is calm and cooperative  No s/s of distress noted   Will continue to monitor     JUAN C [facility]  09/27/18 8948

## 2018-09-27 NOTE — ED NOTES
Pt appears to be sleeping  Pt is calm and cooperative   No s/s of distress noted     JUAN C Blair  09/27/18 0013

## 2018-09-27 NOTE — ED NOTES
Patient appears to be resting comfortably with the lights off and there is no acute distress at this time  Will continue to monitor        Ava Primes  09/26/18 3836

## 2018-09-27 NOTE — ED NOTES
Call placed to [facility] and left v/m for CW  Requested the decision of admission  Requested call back       Alex Arthur LMSW  09/27/18  3090

## 2018-09-27 NOTE — ED NOTES
Call received from [facility] stating they cannot accept pt due to her medical needs  Call received from [facility], spoke with Lilibeth Young who stated the doctor is unable to accept pt due to calderon aclderon diagnosis       CHRIST Miranda  09/26/18 2055

## 2018-09-27 NOTE — ED NOTES
Pt asked for items to clean herself with  Pt was provided with this and she is in the restroom freshening up       [facility]  09/27/18 1930

## 2018-09-27 NOTE — ED NOTES
Pt is laying down on the bed  Pt appears to be sleeping  Pt is calm and cooperative  No s/s of distress        Aileen Jarrell  09/27/18 8520

## 2018-09-28 PROBLEM — F33.9 MAJOR DEPRESSION, RECURRENT, CHRONIC (HCC): Status: ACTIVE | Noted: 2018-09-28

## 2018-09-28 LAB
ALBUMIN SERPL BCP-MCNC: 4.2 G/DL (ref 3–5.2)
ALP SERPL-CCNC: 62 U/L (ref 43–122)
ALT SERPL W P-5'-P-CCNC: 26 U/L (ref 9–52)
ANION GAP SERPL CALCULATED.3IONS-SCNC: 9 MMOL/L (ref 5–14)
AST SERPL W P-5'-P-CCNC: 19 U/L (ref 14–36)
BASOPHILS # BLD AUTO: 0 THOUSANDS/ΜL (ref 0–0.1)
BASOPHILS NFR BLD AUTO: 0 % (ref 0–1)
BILIRUB SERPL-MCNC: 0.4 MG/DL
BUN SERPL-MCNC: 12 MG/DL (ref 5–25)
CALCIUM SERPL-MCNC: 9.1 MG/DL (ref 8.4–10.2)
CHLORIDE SERPL-SCNC: 103 MMOL/L (ref 97–108)
CO2 SERPL-SCNC: 27 MMOL/L (ref 22–30)
CREAT SERPL-MCNC: 0.58 MG/DL (ref 0.6–1.2)
EOSINOPHIL # BLD AUTO: 0.1 THOUSAND/ΜL (ref 0–0.4)
EOSINOPHIL NFR BLD AUTO: 1 % (ref 0–6)
ERYTHROCYTE [DISTWIDTH] IN BLOOD BY AUTOMATED COUNT: 20 %
GFR SERPL CREATININE-BSD FRML MDRD: 116 ML/MIN/1.73SQ M
GLUCOSE P FAST SERPL-MCNC: 97 MG/DL (ref 70–99)
GLUCOSE SERPL-MCNC: 97 MG/DL (ref 70–99)
HCT VFR BLD AUTO: 34.2 % (ref 36–46)
HGB BLD-MCNC: 10.8 G/DL (ref 12–16)
HYPERCHROMIA BLD QL SMEAR: PRESENT
LYMPHOCYTES # BLD AUTO: 2.6 THOUSANDS/ΜL (ref 0.5–4)
LYMPHOCYTES NFR BLD AUTO: 31 % (ref 20–50)
MCH RBC QN AUTO: 22.7 PG (ref 26–34)
MCHC RBC AUTO-ENTMCNC: 31.6 G/DL (ref 31–36)
MCV RBC AUTO: 72 FL (ref 80–100)
MONOCYTES # BLD AUTO: 0.6 THOUSAND/ΜL (ref 0.2–0.9)
MONOCYTES NFR BLD AUTO: 8 % (ref 1–10)
NEUTROPHILS # BLD AUTO: 4.9 THOUSANDS/ΜL (ref 1.8–7.8)
NEUTS SEG NFR BLD AUTO: 60 % (ref 45–65)
PLATELET # BLD AUTO: 299 THOUSANDS/UL (ref 150–450)
PLATELET # BLD AUTO: 313 THOUSANDS/UL (ref 150–450)
PLATELET BLD QL SMEAR: ADEQUATE
PMV BLD AUTO: 8.6 FL (ref 8.9–12.7)
PMV BLD AUTO: 9.1 FL (ref 8.9–12.7)
POTASSIUM SERPL-SCNC: 3.6 MMOL/L (ref 3.6–5)
PROT SERPL-MCNC: 7 G/DL (ref 5.9–8.4)
RBC # BLD AUTO: 4.77 MILLION/UL (ref 4–5.2)
RBC MORPH BLD: NORMAL
SODIUM SERPL-SCNC: 139 MMOL/L (ref 137–147)
TSH SERPL DL<=0.05 MIU/L-ACNC: 1.8 UIU/ML (ref 0.47–4.68)
WBC # BLD AUTO: 8.3 THOUSAND/UL (ref 4.5–11)

## 2018-09-28 PROCEDURE — 85025 COMPLETE CBC W/AUTO DIFF WBC: CPT | Performed by: PHYSICIAN ASSISTANT

## 2018-09-28 PROCEDURE — 93005 ELECTROCARDIOGRAM TRACING: CPT

## 2018-09-28 PROCEDURE — 85049 AUTOMATED PLATELET COUNT: CPT | Performed by: PHYSICIAN ASSISTANT

## 2018-09-28 PROCEDURE — 80053 COMPREHEN METABOLIC PANEL: CPT | Performed by: PHYSICIAN ASSISTANT

## 2018-09-28 PROCEDURE — 84443 ASSAY THYROID STIM HORMONE: CPT | Performed by: PHYSICIAN ASSISTANT

## 2018-09-28 RX ORDER — QUETIAPINE FUMARATE 25 MG/1
12.5 TABLET, FILM COATED ORAL 2 TIMES DAILY
Status: DISCONTINUED | OUTPATIENT
Start: 2018-09-28 | End: 2018-09-30

## 2018-09-28 RX ORDER — PAROXETINE HYDROCHLORIDE 20 MG/1
20 TABLET, FILM COATED ORAL DAILY
Status: DISCONTINUED | OUTPATIENT
Start: 2018-09-28 | End: 2018-10-12 | Stop reason: HOSPADM

## 2018-09-28 RX ORDER — ASPIRIN 325 MG
325 TABLET ORAL DAILY
Status: DISCONTINUED | OUTPATIENT
Start: 2018-09-28 | End: 2018-10-12 | Stop reason: HOSPADM

## 2018-09-28 RX ADMIN — QUETIAPINE FUMARATE 12.5 MG: 25 TABLET ORAL at 12:02

## 2018-09-28 RX ADMIN — ATORVASTATIN CALCIUM 40 MG: 40 TABLET, FILM COATED ORAL at 17:07

## 2018-09-28 RX ADMIN — PAROXETINE 20 MG: 20 TABLET, FILM COATED ORAL at 12:02

## 2018-09-28 RX ADMIN — DOXEPIN HYDROCHLORIDE 25 MG: 25 CAPSULE ORAL at 21:19

## 2018-09-28 RX ADMIN — QUETIAPINE FUMARATE 12.5 MG: 25 TABLET ORAL at 17:07

## 2018-09-28 RX ADMIN — ENOXAPARIN SODIUM 40 MG: 40 INJECTION SUBCUTANEOUS at 08:27

## 2018-09-28 RX ADMIN — GABAPENTIN 300 MG: 300 CAPSULE ORAL at 15:35

## 2018-09-28 RX ADMIN — NICOTINE 1 PATCH: 14 PATCH, EXTENDED RELEASE TRANSDERMAL at 08:27

## 2018-09-28 RX ADMIN — GABAPENTIN 300 MG: 300 CAPSULE ORAL at 21:19

## 2018-09-28 RX ADMIN — NICOTINE POLACRILEX 2 MG: 2 GUM, CHEWING ORAL at 21:19

## 2018-09-28 RX ADMIN — ASPIRIN 81 MG 324 MG: 81 TABLET ORAL at 08:27

## 2018-09-28 RX ADMIN — ATORVASTATIN CALCIUM 40 MG: 40 TABLET, FILM COATED ORAL at 00:21

## 2018-09-28 RX ADMIN — LEVOTHYROXINE SODIUM 137 MCG: 112 TABLET ORAL at 06:27

## 2018-09-28 RX ADMIN — GABAPENTIN 300 MG: 300 CAPSULE ORAL at 08:27

## 2018-09-28 RX ADMIN — NICOTINE POLACRILEX 2 MG: 2 GUM, CHEWING ORAL at 17:22

## 2018-09-28 NOTE — ASSESSMENT & PLAN NOTE
· Patient reports smoking approximately one cigar per day; prior to that hx of cigarette smoking  · Nicotine patch  · Counseled cessation

## 2018-09-28 NOTE — ASSESSMENT & PLAN NOTE
· Patient reports she has been homeless for 2 years  Although she reports to me she takes all of her medications, other reports indicate patient has not been taking any/all of her prescribed medications    · States that since her father  several years ago, "things haven't been the same"

## 2018-09-28 NOTE — PROGRESS NOTES
Patient admitted to our unit  Patient calm on approach and cooperative with care  Denies SI/AH/VH  Denies pain, SOB, N/V  Report cough  Lungs are clear on auscultation  No edema  Peripheral pulses present, +3  No calf tenderness  LBM 09/26 Patient is a smoker  Patient compliant with bedtime medications  Nicotine gum provided as ordered  Patient is now setting quietly at the day room   Will continue to monitor per protocol

## 2018-09-28 NOTE — ASSESSMENT & PLAN NOTE
· Hx of CVA likely secondary to moyamoya disease; patient believes she had craniotomy/revascularization in 2017 for blocked intracranial vessel    Patient most recent stroke was in August  · Patient denies residual deficit, however does report some unsteady gait and occasionally uses a cane  · PT eval  · Continue ASA and statin, add lovenox  · Will order EKG for AM

## 2018-09-28 NOTE — SOCIAL WORK
Maxi Check form [facility] is aware that we do not have  A DC date yet for pt and she is improving  Maxi Alvarez is hoping to meet with pt to discuss housing Wed and will check in [facility] to determine  [place] date  Maxi Alvarez stated that pt often appears stable and conceals her symptoms  She recommended that staff regularly check in with her regarding psychiatric symptoms  SW will continue to follow up  DC plan new apartment vs shelter

## 2018-09-28 NOTE — PLAN OF CARE
Alteration in Thoughts and Perception     Treatment Goal: Gain control of psychotic behaviors/thinking, reduce/eliminate presenting symptoms and demonstrate improved reality functioning upon discharge Progressing     Verbalize thoughts and feelings Progressing     Refrain from acting on delusional thinking/internal stimuli Progressing     Agree to be compliant with medication regime, as prescribed and report medication side effects Progressing     Attend and participate in unit activities, including therapeutic, recreational, and educational groups Progressing     Recognize dysfunctional thoughts, communicate reality-based thoughts at the time of discharge Progressing     Complete daily ADLs, including personal hygiene independently, as able Progressing        Anxiety     Anxiety is at manageable level Progressing        Depression     Treatment Goal: Demonstrate behavioral control of depressive symptoms, verbalize feelings of improved mood/affect, and adopt new coping skills prior to discharge Progressing     Verbalize thoughts and feelings Progressing     Refrain from harming self Progressing     Refrain from isolation Progressing     Refrain from self-neglect Progressing     Attend and participate in unit activities, including therapeutic, recreational, and educational groups Progressing     Complete daily ADLs, including personal hygiene independently, as able Progressing        Ineffective Coping     Cooperates with admission process Progressing     Identifies ineffective coping skills Progressing     Identifies healthy coping skills Progressing     Demonstrates healthy coping skills Progressing     Participates in unit activities Progressing     Patient/Family participate in treatment and DC plans Progressing     Patient/Family verbalizes awareness of resources Progressing     Understands least restrictive measures Progressing     Free from restraint events Progressing        Risk for Self Injury/Neglect     Treatment Goal: Remain safe during length of stay, learn and adopt new coping skills, and be free of self-injurious ideation, impulses and acts at the time of discharge Progressing     Verbalize thoughts and feelings Progressing     Refrain from harming self Progressing     Attend and participate in unit activities, including therapeutic, recreational, and educational groups Progressing     Recognize maladaptive responses and adopt new coping mechanisms Progressing     Complete daily ADLs, including personal hygiene independently, as able Progressing        Risk for Violence/Aggression Toward Others     Treatment Goal: Refrain from acts of violence/aggression during length of stay, and demonstrate improved impulse control at the time of discharge Progressing     Verbalize thoughts and feelings Progressing     Refrain from harming others Progressing     Refrain from destructive acts on the environment or property Progressing     Control angry outbursts Progressing     Attend and participate in unit activities, including therapeutic, recreational, and educational groups Progressing     Identify appropriate positive anger management techniques Progressing

## 2018-09-28 NOTE — SOCIAL WORK
Psycho Social Assessment: Pt was transferred from [facility]  Pt states she sees a psychiatrist through [facility] but she would like to see a different  psychiatrist in the [facility] once DC  Pt wants to go to independent apartment in [address] post DC  Pt is hopeful [facility] will assist however pt  States she "wants to fire [facility]" because they failed to meet her for a housing interview at the welfare office last week after she walked miles  Pt is irritable  and mood is labile  Pt insight is poor  Pt was homeless living in a tent prior to DC  SW spoke with pt ACT team worker Edna Chávez  They will check in Mon and are hoping to set up a housing interview at hospital on Wed with appropriate parties  Pt is in agreement but is hopefull to be DC before Wed  Pt states her BF gaby can provide transport  Pt states she was admitted because her sandal  feel off and she was walking in the rain  Pt states she has mental jose dx of psychosis but does not know why she is at the hospital   Pt is aware she is a 34 from [place]  Pt has flight of ideas and is tangential  SW will continue to follow up

## 2018-09-28 NOTE — NURSING NOTE
Patient remains cooperative with staff and compliant with medications  Patient continues to deny suicidal ideation and no attempts at self harm noted  Patient is visible on the unit and interacts with several peers without difficulty  No new issues noted

## 2018-09-28 NOTE — PROGRESS NOTES
09/28/18 1000   Activity/Group Checklist   Group Other (Comment)  (Art Therapy Process Group / Open)   Attendance Attended   Attendance Duration (min) 46-60   Interactions Other (Comment)  (Self-absorbed and monopolizing in interactions)   Affect/Mood Constricted   Goals Achieved Able to engage in interactions  (Able to engage art materials)     Patient presented as self-centered and monopolized much of the group time; attention-seeking  Axis II behaviors at times, invasive and disruptive when others spoke

## 2018-09-28 NOTE — CONSULTS
Consult- Kev Perez 1980, 34 y o  female MRN: [id number]    Unit/Bed#: [facility] 000-75 Encounter: [id number]    Primary Care Provider: Gabino Kowalski MD   Date and time admitted to hospital: 2018  9:27 PM      Inpatient consult for Medical Clearance for [address] patient  Consult performed by: Sherita Camilo ordered by: Juan Alberto Key        History of CVA (cerebrovascular accident)   Assessment & Plan    · Hx of CVA likely secondary to moyamoya disease; patient believes she had craniotomy/revascularization in  for blocked intracranial vessel  Patient most recent stroke was in August  · Patient denies residual deficit, however does report some unsteady gait and occasionally uses a cane  · PT eval  · Continue ASA and statin, add lovenox  · Will order EKG for AM     Moyamoya disease   Assessment & Plan    · Patient reports dx approximately 5 years ago; likely precipitated patient's hx of CVA     Psychosocial problem   Assessment & Plan    · Patient reports she has been homeless for 2 years  Although she reports to me she takes all of her medications, other reports indicate patient has not been taking any/all of her prescribed medications  · States that since her father  several years ago, "things haven't been the same"     Hypothyroidism   Assessment & Plan    · Continue levothyroxine  · Re-check TSH  Patient reports she has been taking levothyroxine - however as per prior notes it appears patient also reported she hasn't been taking any medication  Tobacco use   Assessment & Plan    · Patient reports smoking approximately one cigar per day; prior to that hx of cigarette smoking  · Nicotine patch  · Counseled cessation         VTE Prophylaxis: Enoxaparin (Lovenox)  / reason for no mechanical VTE prophylaxis ambulate     Recommendations for Discharge:  · Patient will need medical follow-up as outpatient  Counseling / Coordination of Care Time: 45 minutes    Greater than 50% of total time spent on patient counseling and coordination of care  Collaboration of Care: Were Recommendations Directly Discussed with Primary Treatment Team? - No     History of Present Illness:    David Rodriguez is a 34 y o  female who is originally admitted to the psychiatry service due to psychiatric instability under 302  We are consulted for medical clearance  PMHx of moyamoya disease, CVA s/p brain surgery, hypothyroidism, and psychosis  Patient reports that she has been homeless the last two years; unclear whether patient has been compliant with medications  Patient is tangential and requires redirection during encounter  Patient is unable to discern what brought her to the hospital   Otherwise denies chest pain/palpitations, nausea/vomiting, abdominal pain, or diarrhea  Patient reports she has been taking all of her medications, which she keeps in a lunchbox  Review of Systems:    Review of Systems   Constitutional: Negative for chills, fatigue and fever  HENT: Negative for trouble swallowing  Eyes: Negative for photophobia and visual disturbance  Respiratory: Negative for cough, chest tightness, shortness of breath and wheezing  Cardiovascular: Negative for chest pain, palpitations and leg swelling  Gastrointestinal: Negative for abdominal pain, constipation, diarrhea, nausea and vomiting  Endocrine: Negative for polyuria  Genitourinary: Negative for dysuria, flank pain, hematuria, menstrual problem and urgency  Musculoskeletal: Positive for gait problem  Negative for myalgias and neck stiffness  Skin: Negative for pallor and rash  Neurological: Negative for dizziness, syncope, speech difficulty, weakness, light-headedness, numbness and headaches  Psychiatric/Behavioral: Negative for agitation, confusion, hallucinations and suicidal ideas         Past Medical and Surgical History:     Past Medical History:   Diagnosis Date    Disease of thyroid gland     History of CVA (cerebrovascular accident)     Hypertension     Moyamoya disease     Psychiatric disorder     Stroke ([address] )     Tobacco use        Past Surgical History:   Procedure Laterality Date    BRAIN SURGERY      BRAIN SURGERY  04/10/2017    moyamoya       Meds/Allergies:    all medications and allergies reviewed    Allergies: Allergies   Allergen Reactions    Penicillin G Hives       Social History:     Marital Status: Single    Substance Use History:   History   Alcohol Use No     History   Smoking Status    Current Some Day Smoker    Packs/day: 0 50    Types: Cigarettes   Smokeless Tobacco    Never Used     History   Drug Use No       Family History:    Family History   Problem Relation Age of Onset    Depression Mother     Heart disease Father     Hypertension Father     Diabetes Father     Breast cancer Maternal Grandmother        Physical Exam:     Vitals:   Blood Pressure: 137/74 (09/27/18 2203)  Pulse: 92 (09/27/18 2203)  Temperature: 98 1 °F (36 7 °C) (09/27/18 2203)  Temp Source: Temporal (09/27/18 2203)  Respirations: 20 (09/27/18 2203)  Height: 5' 3" (160 cm) (09/27/18 2203)  Weight - Scale: 73 8 kg (162 lb 11 2 oz) (09/27/18 2203)  SpO2: 98 % (09/27/18 2203)    Physical Exam   Constitutional: She is oriented to person, place, and time  Vital signs are normal  She appears well-developed  Disheveled   HENT:   Head: Normocephalic  Eyes: Pupils are equal, round, and reactive to light  EOM are normal  No scleral icterus  Neck: Normal range of motion  Cardiovascular: Normal rate, regular rhythm and normal heart sounds  No murmur heard  Pulmonary/Chest: Effort normal and breath sounds normal  No respiratory distress  Abdominal: Soft  Bowel sounds are normal  There is no tenderness  Musculoskeletal: She exhibits no edema  Neurological: She is alert and oriented to person, place, and time  Skin: Skin is warm and dry  Psychiatric: She has a normal mood and affect   Her behavior is normal  Her speech is tangential  Cognition and memory are impaired  She expresses no homicidal and no suicidal ideation  Additional Data:     Lab Results: I have personally reviewed pertinent reports  Invalid input(s): LABALBU          Lab Results   Component Value Date/Time    HGBA1C 5 6 08/25/2018 05:25 AM    HGBA1C 5 8 07/17/2018 06:12 AM    HGBA1C 6 2 10/08/2017 04:34 AM           Imaging: I have personally reviewed pertinent reports  No orders to display       EKG, Pathology, and Other Studies Reviewed on Admission:   · EKG: will order for AM    ** Please Note: This note has been constructed using a voice recognition system   **

## 2018-09-28 NOTE — ASSESSMENT & PLAN NOTE
· Continue levothyroxine  · Re-check TSH  Patient reports she has been taking levothyroxine - however as per prior notes it appears patient also reported she hasn't been taking any medication

## 2018-09-28 NOTE — NURSING NOTE
Patient continues to be compliant with medications and meals  No complaints of pain or discomfort  Patient denies suicidal ideation and no attempts at self harm noted  Patient's appetite remains good with patient eating 100% of meals  Patient with mild confusion at times but is alert and oriented x 3  No new issues noted at this time

## 2018-09-28 NOTE — SOCIAL WORK
Pt has twila  [facility] [phone](ext [phone]  Pt signed PORFIRIO for Charles Cooper an her  Adilia Barriga
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
## 2018-09-28 NOTE — SOCIAL WORK
Pt states that SW does not need to follow up with her BF Kaylah Alves  SW will continue to follow up

## 2018-09-28 NOTE — SOCIAL WORK
[phone] sent to Barb Newell [phone](ext )  BEHAVIORAL MEDICINE AT [facility] for Madhuri Del Frazier 4 hearing Mon 10/1/18 with [facility] via phone  SW will continue to follow up

## 2018-09-29 LAB
ATRIAL RATE: 83 BPM
P AXIS: 54 DEGREES
PR INTERVAL: 138 MS
QRS AXIS: 69 DEGREES
QRSD INTERVAL: 68 MS
QT INTERVAL: 384 MS
QTC INTERVAL: 451 MS
T WAVE AXIS: 54 DEGREES
VENTRICULAR RATE: 83 BPM

## 2018-09-29 PROCEDURE — 93010 ELECTROCARDIOGRAM REPORT: CPT | Performed by: INTERNAL MEDICINE

## 2018-09-29 RX ORDER — AMOXICILLIN 250 MG
1 CAPSULE ORAL
Status: DISCONTINUED | OUTPATIENT
Start: 2018-09-29 | End: 2018-10-12 | Stop reason: HOSPADM

## 2018-09-29 RX ORDER — POLYETHYLENE GLYCOL 3350 17 G/17G
17 POWDER, FOR SOLUTION ORAL DAILY PRN
Status: DISCONTINUED | OUTPATIENT
Start: 2018-09-29 | End: 2018-10-12 | Stop reason: HOSPADM

## 2018-09-29 RX ADMIN — QUETIAPINE FUMARATE 12.5 MG: 25 TABLET ORAL at 17:03

## 2018-09-29 RX ADMIN — SENNOSIDES AND DOCUSATE SODIUM 1 TABLET: 8.6; 5 TABLET ORAL at 21:00

## 2018-09-29 RX ADMIN — GABAPENTIN 300 MG: 300 CAPSULE ORAL at 20:59

## 2018-09-29 RX ADMIN — GABAPENTIN 300 MG: 300 CAPSULE ORAL at 08:31

## 2018-09-29 RX ADMIN — ATORVASTATIN CALCIUM 40 MG: 40 TABLET, FILM COATED ORAL at 17:03

## 2018-09-29 RX ADMIN — NICOTINE POLACRILEX 2 MG: 2 GUM, CHEWING ORAL at 08:50

## 2018-09-29 RX ADMIN — QUETIAPINE FUMARATE 12.5 MG: 25 TABLET ORAL at 08:31

## 2018-09-29 RX ADMIN — LEVOTHYROXINE SODIUM 137 MCG: 112 TABLET ORAL at 05:55

## 2018-09-29 RX ADMIN — PAROXETINE 20 MG: 20 TABLET, FILM COATED ORAL at 08:31

## 2018-09-29 RX ADMIN — ASPIRIN 325 MG ORAL TABLET 325 MG: 325 PILL ORAL at 08:31

## 2018-09-29 RX ADMIN — ENOXAPARIN SODIUM 40 MG: 40 INJECTION SUBCUTANEOUS at 08:31

## 2018-09-29 RX ADMIN — NICOTINE 1 PATCH: 14 PATCH, EXTENDED RELEASE TRANSDERMAL at 08:33

## 2018-09-29 RX ADMIN — GABAPENTIN 300 MG: 300 CAPSULE ORAL at 15:31

## 2018-09-29 RX ADMIN — DOXEPIN HYDROCHLORIDE 25 MG: 25 CAPSULE ORAL at 21:01

## 2018-09-29 RX ADMIN — NICOTINE POLACRILEX 2 MG: 2 GUM, CHEWING ORAL at 15:31

## 2018-09-29 NOTE — NURSING NOTE
When going to do pt vitals notice patients draw was unlocked and she had a pair of sweat pants with strings  I told the nurse geovanna   I locked her draw and told pt that strings had to come out if she wanted to continue to use them  She was ok with pulling the strings out

## 2018-09-29 NOTE — PROGRESS NOTES
Patient remains sp1 for safety  Patient appears to be sleeping in her bed  She shows no signs of harmful behaviors or distress  Will continue to monitor for safety and support

## 2018-09-29 NOTE — PROGRESS NOTES
Patient is alert and oriented  Patient has bizarre behavior and conversations  Patient made a comment to that she needs a toilet seat to go to the bathroom  I explained that they are none for safety reasons  Patient denies si  Will continue to monitor for safety and support

## 2018-09-29 NOTE — PROGRESS NOTES
Psychiatry Progress Note    Subjective: Interval History     The patient is pleasant this morning however bizarre during conversation  The nurse found a bag of hair in her room which she states she is saving to make a puppet  She has an irritable edge at times  She states she has been homeless but is able to manage this way  She has poor hygiene is disheveled this morning  She is a poor historian during conversation  Patient has been unable to care for herself  She denies feeling depressed or having suicidal ideations this morning  She has very poor insight  Staff states last night she was very fixated on getting a toilet seat and stated if she did not she would go all over herself  Patient denies hallucinations      Current medications:    Current Facility-Administered Medications:     aspirin tablet 325 mg, 325 mg, Oral, Daily, Alex Rawls MD    atorvastatin (LIPITOR) tablet 40 mg, 40 mg, Oral, QPM, Elbert Faustin PA-C, 40 mg at 09/28/18 1707    diphenhydrAMINE (BENADRYL) injection 50 mg, 50 mg, Intramuscular, Q4H PRN, Latrell Lau PA-C    doxepin (SINEquan) capsule 25 mg, 25 mg, Oral, HS, Latrell Lau PA-C, 25 mg at 09/28/18 2119    enoxaparin (LOVENOX) subcutaneous injection 40 mg, 40 mg, Subcutaneous, Q24H [address], DWIGHT Russell-TRISTIAN, 40 mg at 09/28/18 0827    gabapentin (NEURONTIN) capsule 300 mg, 300 mg, Oral, TID, Latrell Lau PA-C, 300 mg at 09/28/18 2119    levothyroxine tablet 137 mcg, 137 mcg, Oral, Early Morning, Elbert Faustin PA-C, 137 mcg at 09/29/18 0555    LORazepam (ATIVAN) 2 mg/mL injection 0 5 mg, 0 5 mg, Intramuscular, Q4H PRN, Latrell Lau PA-C    LORazepam (ATIVAN) tablet 0 5 mg, 0 5 mg, Oral, Q4H PRN, Latrell Lau PA-C    melatonin tablet 6 mg, 6 mg, Oral, HS PRN, Latrell Lau PA-C    nicotine (NICODERM CQ) 14 mg/24hr TD 24 hr patch 1 patch, 1 patch, Transdermal, Daily, Page Lennon PA-C, 1 patch at 09/28/18 0529   nicotine polacrilex (NICORETTE) gum 2 mg, 2 mg, Oral, Q4H PRN, DWIGHT Ellington-C, 2 mg at 09/28/18 2119    OLANZapine (ZyPREXA) IM injection 5 mg, 5 mg, Intramuscular, Q4H PRN, Chloé Garcias PA-C    OLANZapine (ZyPREXA) tablet 5 mg, 5 mg, Oral, Q4H PRN, Chloé Garcias PA-C    PARoxetine (PAXIL) tablet 20 mg, 20 mg, Oral, Daily, Gabrielle Ellsworth MD, 20 mg at 09/28/18 1202    QUEtiapine (SEROquel) tablet 12 5 mg, 12 5 mg, Oral, BID, Gabrielle Ellsworth MD, 12 5 mg at 09/28/18 1707    traZODone (DESYREL) tablet 50 mg, 50 mg, Oral, HS PRN, Chloé Garcias PA-C      Objective:     Vital Signs:  Vitals:    09/28/18 0740 09/28/18 1549 09/29/18 0710 09/29/18 0718   BP: 127/83 155/99 133/84    BP Location: Right arm Right arm Left arm    Pulse: 83 86 77    Resp: 18 18 18    Temp: 97 8 °F (36 6 °C) (!) 97 3 °F (36 3 °C) (!) 97 2 °F (36 2 °C)    TempSrc: Temporal Temporal Temporal    SpO2: 100% 100% 100%    Weight:    76 8 kg (169 lb 5 oz)   Height:             Appearance:  age appropriate and disheveled   Behavior:  evasive and guarded   Speech:  normal volume   Mood:  depressed and irritable   Affect:  constricted   Thought Process:  circumstantial and disorganized   Thought Content:  normal   Perceptual Disturbances: None   Risk Potential: none   Sensorium:  person, place, situation and time   Cognition:  grossly intact   Consciousness:  alert and awake    Attention: attention span appeared shorter than expected for age   Intellect: within normal limits   Insight:  poor   Judgment: poor      Motor Activity: no abnormal movements           Recent Labs:  Results Reviewed     None          I/O Past 24 hours:  I/O last 3 completed shifts: In: 1020 [P O :1020]  Out: -   No intake/output data recorded  Assessment / Plan:     Major depression, recurrent, chronic (HCC)    Recommended Treatment:      Medication changes:  1) continue current medication regimen    Paxil, doxepin, Seroquel started yesterday  Non-pharmacological treatments  1) Continue with group therapy, milieu therapy and occupational therapy  Safety  1) Safety/communication plan established targeting dynamic risk factors above  2) Risks, benefits, and possible side effects of medications explained to patient and patient verbalizes understanding  Counseling / Coordination of Care    Total floor / unit time spent today 20 minutes  Greater than 50% of total time was spent with the patient and / or family counseling and / or coordination of care  A description of the counseling / coordination of care  Patient's Rights, confidentiality and exceptions to confidentiality, use of automated medical record, [facility] staff access to medical record, and consent to treatment reviewed      Alexandria Hannah PA-C

## 2018-09-29 NOTE — NURSING NOTE
Patient is compliant with medications and meals  Appetite is good with patient eating 100% of meals  No complaints of pain or discomfort  Patient complaint of nicotine urge and requested nicotine gum for same  PRN nicotine gum given at 0850 with good results noted and no further complaint of urges post administration  Patient also educated on risks and benefits of having the Nicotine patch and using the nicotine gum at the same time  Patient still requested the nicotine gum and was given same as requested  Patient denies having been suicidal or having suicidal ideation  No assault or aggressive behaviors noted and patient ambulates independently throughout the unit without difficulty  Steady gait noted at this time  Patient will remain on suicide, assault and fall precautions as ordered  No other issues noted at this time

## 2018-09-30 PROBLEM — F32.3 MAJOR DEPRESSION WITH PSYCHOTIC FEATURES (HCC): Status: ACTIVE | Noted: 2018-09-28

## 2018-09-30 RX ORDER — QUETIAPINE FUMARATE 25 MG/1
25 TABLET, FILM COATED ORAL 2 TIMES DAILY
Status: DISCONTINUED | OUTPATIENT
Start: 2018-09-30 | End: 2018-10-01

## 2018-09-30 RX ADMIN — NICOTINE POLACRILEX 2 MG: 2 GUM, CHEWING ORAL at 09:11

## 2018-09-30 RX ADMIN — NICOTINE 1 PATCH: 14 PATCH, EXTENDED RELEASE TRANSDERMAL at 08:43

## 2018-09-30 RX ADMIN — ATORVASTATIN CALCIUM 40 MG: 40 TABLET, FILM COATED ORAL at 17:12

## 2018-09-30 RX ADMIN — GABAPENTIN 300 MG: 300 CAPSULE ORAL at 15:36

## 2018-09-30 RX ADMIN — LEVOTHYROXINE SODIUM 137 MCG: 112 TABLET ORAL at 05:00

## 2018-09-30 RX ADMIN — NICOTINE POLACRILEX 2 MG: 2 GUM, CHEWING ORAL at 15:39

## 2018-09-30 RX ADMIN — GABAPENTIN 300 MG: 300 CAPSULE ORAL at 08:39

## 2018-09-30 RX ADMIN — GABAPENTIN 300 MG: 300 CAPSULE ORAL at 21:36

## 2018-09-30 RX ADMIN — TRAZODONE HYDROCHLORIDE 50 MG: 50 TABLET ORAL at 21:36

## 2018-09-30 RX ADMIN — ENOXAPARIN SODIUM 40 MG: 40 INJECTION SUBCUTANEOUS at 08:39

## 2018-09-30 RX ADMIN — QUETIAPINE FUMARATE 25 MG: 25 TABLET ORAL at 08:39

## 2018-09-30 RX ADMIN — SENNOSIDES AND DOCUSATE SODIUM 1 TABLET: 8.6; 5 TABLET ORAL at 21:36

## 2018-09-30 RX ADMIN — NICOTINE POLACRILEX 2 MG: 2 GUM, CHEWING ORAL at 21:36

## 2018-09-30 RX ADMIN — QUETIAPINE FUMARATE 25 MG: 25 TABLET ORAL at 17:12

## 2018-09-30 RX ADMIN — PAROXETINE 20 MG: 20 TABLET, FILM COATED ORAL at 08:39

## 2018-09-30 RX ADMIN — ASPIRIN 325 MG ORAL TABLET 325 MG: 325 PILL ORAL at 08:39

## 2018-09-30 RX ADMIN — DOXEPIN HYDROCHLORIDE 25 MG: 25 CAPSULE ORAL at 21:36

## 2018-09-30 NOTE — NURSING NOTE
Patient is medication and meal compliant  No complaints of pain or discomfort  Patient is visible on the unit and ambulates independently throughout the unit without difficulty  Patient denies suicidal ideation but does exhibit grandiose thoughts at times  Patient also wants to transfer to different facilities that she has heard about but has no insight into how to get into these different programs  Patient advised to speak with  in the AM for discharge planning and agreed with this plan  Patient complaint of nicotine urges and requested Nicotine Gum for same  PRN  Nicotine Gum given at 0911 with no further complaint of nicotine urges  Patient remains on suicide, fall and assault precautions at this time  No aggressive behaviors noted this shift

## 2018-09-30 NOTE — NURSING NOTE
Patient remains visible on the unit and cooperative with staff  No complaints of pain or discomfort  Patient with new order from Psychiatry to increase Seroquel to 25 mg PO BID  Patient notified and in agreement with new order  Patient continues to interact with peers without difficulty  Patient denies suicidal ideation and reports she was never suicidal since she was admitted  No aggressive behaviors noted  Patient remains on suicide, assault, and fall precautions at this time and is monitored for same  No new issues

## 2018-09-30 NOTE — PROGRESS NOTES
Patient appears to be sleeping all night till 3 53 AM   Denies suicidal ideation   Will continue monitor

## 2018-09-30 NOTE — PROGRESS NOTES
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  She states they have a full kitchen and she can take everyone's orders  Patient then starts rambling off various breakfast items and states that she will learn how to cook all these  Patient has a paper towel hung on her wall that she ripped in different places  Staff states she was up early this morning  Patient has been talking to herself at times  She continues to have poor insight  She states she slept  She is medication and meal compliant and tolerating her medications well  She denies hallucinations or suicidal ideation      Current medications:    Current Facility-Administered Medications:     aspirin tablet 325 mg, 325 mg, Oral, Daily, Amish Beckford MD, 325 mg at 09/29/18 0831    atorvastatin (LIPITOR) tablet 40 mg, 40 mg, Oral, QPM, [facility] Neena Faustin PA-C, 40 mg at 09/29/18 1703    diphenhydrAMINE (BENADRYL) injection 50 mg, 50 mg, Intramuscular, Q4H PRN, Anthony Hoover PA-C    doxepin (SINEquan) capsule 25 mg, 25 mg, Oral, HS, Anthony Hoover PA-C, 25 mg at 09/29/18 2101    enoxaparin (LOVENOX) subcutaneous injection 40 mg, 40 mg, Subcutaneous, Q24H [facility], [facility] Neena Faustin PA-C, 40 mg at 09/29/18 0831    gabapentin (NEURONTIN) capsule 300 mg, 300 mg, Oral, TID, Anthony Hoover PA-C, 300 mg at 09/29/18 2059    levothyroxine tablet 137 mcg, 137 mcg, Oral, Early Morning, Tony Whipple PA-C, 137 mcg at 09/30/18 0500    LORazepam (ATIVAN) 2 mg/mL injection 0 5 mg, 0 5 mg, Intramuscular, Q4H PRN, Anthony Hoover PA-C    LORazepam (ATIVAN) tablet 0 5 mg, 0 5 mg, Oral, Q4H PRN, Anthony Hoover PA-C    melatonin tablet 6 mg, 6 mg, Oral, HS PRN, Anthony Hoover PA-C    nicotine (NICODERM CQ) 14 mg/24hr TD 24 hr patch 1 patch, 1 patch, Transdermal, Daily, Giovanni Pratt PA-C, 1 patch at 09/29/18 3163    nicotine polacrilex (NICORETTE) gum 2 mg, 2 mg, Oral, Q4H PRN, Moshe Crews PA-C, 2 mg at 09/29/18 1531    OLANZapine (ZyPREXA) IM injection 5 mg, 5 mg, Intramuscular, Q4H PRN, Moshe Crews PA-C    OLANZapine (ZyPREXA) tablet 5 mg, 5 mg, Oral, Q4H PRN, Moshe Crews PA-C    PARoxetine (PAXIL) tablet 20 mg, 20 mg, Oral, Daily, Jo-Ann Aden MD, 20 mg at 09/29/18 0831    polyethylene glycol (MIRALAX) packet 17 g, 17 g, Oral, Daily PRN, NADEEN Jackson    QUEtiapine (SEROquel) tablet 12 5 mg, 12 5 mg, Oral, BID, Jo-Ann Aden MD, 12 5 mg at 09/29/18 1703    senna-docusate sodium (SENOKOT S) 8 6-50 mg per tablet 1 tablet, 1 tablet, Oral, HS, NADEEN Jackson, 1 tablet at 09/29/18 2100    traZODone (DESYREL) tablet 50 mg, 50 mg, Oral, HS PRN, Moshe Crews PA-C      Objective:     Vital Signs:  Vitals:    09/29/18 0710 09/29/18 0718 09/29/18 1510 09/30/18 0650   BP: 133/84  133/71 112/58   BP Location: Left arm  Right arm Right arm   Pulse: 77  78 81   Resp: 18 16 18   Temp: (!) 97 2 °F (36 2 °C)  97 8 °F (36 6 °C) 98 °F (36 7 °C)   TempSrc: Temporal  Temporal Temporal   SpO2: 100%  97% 98%   Weight:  76 8 kg (169 lb 5 oz)     Height:             Appearance:  age appropriate and disheveled   Behavior:  evasive and guarded   Speech:  normal volume   Mood:  depressed and irritable   Affect:  constricted   Thought Process:  circumstantial and disorganized   Thought Content:  normal   Perceptual Disturbances: None   Risk Potential: none   Sensorium:  person, place, situation and time   Cognition:  grossly intact   Consciousness:  alert and awake    Attention: attention span appeared shorter than expected for age   Intellect: within normal limits   Insight:  poor   Judgment: poor      Motor Activity: no abnormal movements           Recent Labs:  Results Reviewed     None          I/O Past 24 hours:  I/O last 3 completed shifts: In: 1200 [P O :1200]  Out: -   No intake/output data recorded  Assessment / Plan:     Major depression with psychotic features ([facility] [address] )    Recommended Treatment:      Medication changes:  1) Increase Seroquel to 25mg BID  Non-pharmacological treatments  1) Continue with group therapy, milieu therapy and occupational therapy  Safety  1) Safety/communication plan established targeting dynamic risk factors above  2) Risks, benefits, and possible side effects of medications explained to patient and patient verbalizes understanding  Counseling / Coordination of Care    Total floor / unit time spent today 20 minutes  Greater than 50% of total time was spent with the patient and / or family counseling and / or coordination of care  A description of the counseling / coordination of care  Patient's Rights, confidentiality and exceptions to confidentiality, use of automated medical record, [facility], and consent to treatment reviewed      Laurie Sparks PA-C

## 2018-10-01 PROCEDURE — 99232 SBSQ HOSP IP/OBS MODERATE 35: CPT | Performed by: NURSE PRACTITIONER

## 2018-10-01 RX ORDER — DOCUSATE SODIUM 100 MG/1
100 CAPSULE, LIQUID FILLED ORAL 2 TIMES DAILY PRN
Status: DISCONTINUED | OUTPATIENT
Start: 2018-10-01 | End: 2018-10-12 | Stop reason: HOSPADM

## 2018-10-01 RX ORDER — LACTULOSE 20 G/30ML
20 SOLUTION ORAL 2 TIMES DAILY PRN
Status: DISCONTINUED | OUTPATIENT
Start: 2018-10-01 | End: 2018-10-12 | Stop reason: HOSPADM

## 2018-10-01 RX ORDER — AMOXICILLIN 250 MG
1 CAPSULE ORAL 2 TIMES DAILY PRN
Status: DISCONTINUED | OUTPATIENT
Start: 2018-10-01 | End: 2018-10-12 | Stop reason: HOSPADM

## 2018-10-01 RX ORDER — QUETIAPINE FUMARATE 50 MG/1
50 TABLET, FILM COATED ORAL 2 TIMES DAILY
Status: DISCONTINUED | OUTPATIENT
Start: 2018-10-01 | End: 2018-10-12 | Stop reason: HOSPADM

## 2018-10-01 RX ADMIN — LORAZEPAM 0.5 MG: 0.5 TABLET ORAL at 15:16

## 2018-10-01 RX ADMIN — ATORVASTATIN CALCIUM 40 MG: 40 TABLET, FILM COATED ORAL at 17:29

## 2018-10-01 RX ADMIN — LEVOTHYROXINE SODIUM 137 MCG: 112 TABLET ORAL at 05:56

## 2018-10-01 RX ADMIN — QUETIAPINE 50 MG: 50 TABLET ORAL at 17:29

## 2018-10-01 RX ADMIN — SENNOSIDES AND DOCUSATE SODIUM 1 TABLET: 8.6; 5 TABLET ORAL at 08:45

## 2018-10-01 RX ADMIN — SENNOSIDES AND DOCUSATE SODIUM 1 TABLET: 8.6; 5 TABLET ORAL at 21:27

## 2018-10-01 RX ADMIN — QUETIAPINE 50 MG: 50 TABLET ORAL at 08:45

## 2018-10-01 RX ADMIN — POLYETHYLENE GLYCOL 3350 17 G: 17 POWDER, FOR SOLUTION ORAL at 08:45

## 2018-10-01 RX ADMIN — ENOXAPARIN SODIUM 40 MG: 40 INJECTION SUBCUTANEOUS at 09:00

## 2018-10-01 RX ADMIN — DOXEPIN HYDROCHLORIDE 25 MG: 25 CAPSULE ORAL at 21:27

## 2018-10-01 RX ADMIN — NICOTINE POLACRILEX 2 MG: 2 GUM, CHEWING ORAL at 10:22

## 2018-10-01 RX ADMIN — GABAPENTIN 300 MG: 300 CAPSULE ORAL at 08:45

## 2018-10-01 RX ADMIN — GABAPENTIN 300 MG: 300 CAPSULE ORAL at 17:29

## 2018-10-01 RX ADMIN — DOCUSATE SODIUM 100 MG: 100 CAPSULE, LIQUID FILLED ORAL at 08:44

## 2018-10-01 RX ADMIN — PAROXETINE 20 MG: 20 TABLET, FILM COATED ORAL at 08:45

## 2018-10-01 RX ADMIN — NICOTINE 1 PATCH: 14 PATCH, EXTENDED RELEASE TRANSDERMAL at 09:00

## 2018-10-01 RX ADMIN — GABAPENTIN 300 MG: 300 CAPSULE ORAL at 21:27

## 2018-10-01 RX ADMIN — ASPIRIN 325 MG ORAL TABLET 325 MG: 325 PILL ORAL at 08:45

## 2018-10-01 NOTE — PROGRESS NOTES
Patient confused   screaming at RN states '' I  Want to go get out , find out who put me here '' Reassurance provided   Patient requested ativan    States '' I need something to calm down ''

## 2018-10-01 NOTE — ASSESSMENT & PLAN NOTE
· Patient reports dx approximately 5 years ago; likely precipitated patient's hx of CVA  · Stable at this time

## 2018-10-01 NOTE — PROGRESS NOTES
Patient sleeping easily to arouse  Compliant with 6:00 am, medication  Denies pain  No further complaints  Patient is now resting quietly in bed   Will continue to monitor per protocol

## 2018-10-01 NOTE — PROGRESS NOTES
Psychiatry Progress Note    Subjective: Interval History     Patient continues to be bizarre  She has a 303 hearing this morning  Patient has been compliant with both medications well as meals  Still becomes grandiose at times  Patient continues to be focused on transfer to different units that she hears about  She continues to show very poor insight into her illness  Patient has been tolerating the Seroquel adjustment well without side effect  Continues to be loose and tangential at times      Current medications:    Current Facility-Administered Medications:     aspirin tablet 325 mg, 325 mg, Oral, Daily, Audry Nyhan, MD, 325 mg at 09/30/18 0839    atorvastatin (LIPITOR) tablet 40 mg, 40 mg, Oral, QPM, [facility] DWIGHT Alvarado-C, 40 mg at 09/30/18 1712    diphenhydrAMINE (BENADRYL) injection 50 mg, 50 mg, Intramuscular, Q4H PRN, Stan Don PA-C    docusate sodium (COLACE) capsule 100 mg, 100 mg, Oral, BID PRN, Estevan Matta PA-C    doxepin (SINEquan) capsule 25 mg, 25 mg, Oral, HS, DWIGHT Fulton-C, 25 mg at 09/30/18 2136    enoxaparin (LOVENOX) subcutaneous injection 40 mg, 40 mg, Subcutaneous, Q24H [facility], [facility] Iram Faustin PA-C, 40 mg at 09/30/18 1509    gabapentin (NEURONTIN) capsule 300 mg, 300 mg, Oral, TID, Stan Don PA-C, 300 mg at 09/30/18 2136    lactulose 20 g/30 mL oral solution 20 g, 20 g, Oral, BID PRN, Estevan Matta PA-C    levothyroxine tablet 137 mcg, 137 mcg, Oral, Early Morning, Caitie Castillo PA-C, 137 mcg at 10/01/18 0556    LORazepam (ATIVAN) 2 mg/mL injection 0 5 mg, 0 5 mg, Intramuscular, Q4H PRN, Stan Don PA-C    LORazepam (ATIVAN) tablet 0 5 mg, 0 5 mg, Oral, Q4H PRN, Stan Don PA-TRISTIAN    melatonin tablet 6 mg, 6 mg, Oral, HS PRN, DWIGHT Fulton-C    nicotine (NICODERM CQ) 14 mg/24hr TD 24 hr patch 1 patch, 1 patch, Transdermal, Daily, Caitie Castillo PA-C, 1 patch at 09/30/18 2367    nicotine polacrilex (NICORETTE) gum 2 mg, 2 mg, Oral, Q4H PRN, Ana Paula Rodriguez PA-C, 2 mg at 09/30/18 2136    OLANZapine (ZyPREXA) IM injection 5 mg, 5 mg, Intramuscular, Q4H PRN, [facility] LUIZA Rodriguez    OLANZapine (ZyPREXA) tablet 5 mg, 5 mg, Oral, Q4H PRN, [facility] LUIZA Rodriguez    PARoxetine (PAXIL) tablet 20 mg, 20 mg, Oral, Daily, Morris Chacon MD, 20 mg at 09/30/18 2911    polyethylene glycol (MIRALAX) packet 17 g, 17 g, Oral, Daily PRN, NADEEN Beltran    QUEtiapine (SEROquel) tablet 25 mg, 25 mg, Oral, BID, Melani Ramsay PA-C, 25 mg at 09/30/18 1712    senna-docusate sodium (SENOKOT S) 8 6-50 mg per tablet 1 tablet, 1 tablet, Oral, HS, NADEEN Beltran, 1 tablet at 09/30/18 2136    senna-docusate sodium (SENOKOT S) 8 6-50 mg per tablet 1 tablet, 1 tablet, Oral, BID PRN, Emili Gimenez PA-C    traZODone (DESYREL) tablet 50 mg, 50 mg, Oral, HS PRN, Ana Paula Rodriguez PA-C, 50 mg at 09/30/18 2136      Objective:     Vital Signs:  Vitals:    09/29/18 0718 09/29/18 1510 09/30/18 0650 09/30/18 1541   BP:  133/71 112/58 138/79   BP Location:  Right arm Right arm Left arm   Pulse:  78 81 93   Resp:  16 18 16   Temp:  97 8 °F (36 6 °C) 98 °F (36 7 °C) 97 8 °F (36 6 °C)   TempSrc:  Temporal Temporal Temporal   SpO2:  97% 98% 98%   Weight: 76 8 kg (169 lb 5 oz)      Height:             Appearance:  age appropriate and disheveled   Behavior:  evasive and guarded   Speech:  normal volume   Mood:  depressed and irritable   Affect:  constricted   Thought Process:  circumstantial and disorganized   Thought Content:  normal   Perceptual Disturbances: None   Risk Potential: none   Sensorium:  person, place, situation and time   Cognition:  grossly intact   Consciousness:  alert and awake    Attention: attention span appeared shorter than expected for age   Intellect: within normal limits   Insight:  poor   Judgment: poor      Motor Activity: no abnormal movements           Recent Labs:  Results Reviewed None          I/O Past 24 hours:  I/O last 3 completed shifts: In: 840 [P O :840]  Out: -   No intake/output data recorded  Assessment / Plan:     Major depression with psychotic features ([facility] [address] )    Recommended Treatment:      Medication changes:  1) increase Seroquel to 50 mg b i d  Non-pharmacological treatments  1) Continue with group therapy, milieu therapy and occupational therapy  Safety  1) Safety/communication plan established targeting dynamic risk factors above  2) Risks, benefits, and possible side effects of medications explained to patient and patient verbalizes understanding  Counseling / Coordination of Care    Total floor / unit time spent today 20 minutes  Greater than 50% of total time was spent with the patient and / or family counseling and / or coordination of care  A description of the counseling / coordination of care  Patient's Rights, confidentiality and exceptions to confidentiality, use of automated medical record, [facility] staff access to medical record, and consent to treatment reviewed      Nils Reis PA-C

## 2018-10-01 NOTE — PROGRESS NOTES
Progress Note - Johny Corona 1980, 34 y o  female MRN: [id number]    Unit/Bed#: [facility] 868-75 Encounter: [id number]    Primary Care Provider: Valentina Coughlin MD   Date and time admitted to hospital: 9/27/2018  9:27 PM    Will sign off as pt is medically stable  Call with any changes  * Major depression with psychotic features [facility])   Assessment & Plan    Management per psych  Pt states she want to leave  "I don't belong here "       Psychosocial problem   Assessment & Plan    · Patient reports she is homeless and living in tent  She had been going to the bathroom insider her tent due to raccoons being outside her tent  She is upset that her  had her admitted  Moyamoya disease   Assessment & Plan    · Patient reports dx approximately 5 years ago; likely precipitated patient's hx of CVA  · Stable at this time  History of CVA (cerebrovascular accident)   Assessment & Plan    · Hx of CVA likely secondary to moyamoya disease; patient reportedly believes she had craniotomy/revascularization in 2017 for blocked intracranial vessel  Most recent stroke was in August, per previous documentation  · Patient denies residual deficit other than reduced vision  · Continue ASA, statin, and  lovenox for dvt ppx  · EKG NSR     Hypothyroidism   Assessment & Plan    · Continue levothyroxine  · TSH wnl     Tobacco use   Assessment & Plan    Counseled cessation - nicotine patch ordered         VTE Pharmacologic Prophylaxis:   Pharmacologic: Enoxaparin (Lovenox)  Mechanical VTE Prophylaxis in Place: No    Patient Centered Rounds: I have performed bedside rounds with nursing staff today  Discussions with Specialists or Other Care Team Provider:     Education and Discussions with Family / Patient: Concerns discussed with pt  Time Spent for Care: 20 minutes  More than 50% of total time spent on counseling and coordination of care as described above      Current Length of Stay: 4 day(s)    Current Patient Status: Inpatient Psych   Certification Statement: The patient will continue to require additional inpatient hospital stay due to psychiatric illness    Discharge Plan: Per primary team when stable    Code Status: Level 1 - Full Code      Subjective:   Pt denies pain, CP, SOB, N/V/D, constipation, dysuria, dizziness, light-headedness  Objective:     Vitals:   Temp (24hrs), Av 8 °F (36 6 °C), Min:97 8 °F (36 6 °C), Max:97 8 °F (36 6 °C)    HR:  [93] 93  Resp:  [16] 16  BP: (138)/(79) 138/79  SpO2:  [98 %] 98 %  Body mass index is 29 99 kg/m²  Input and Output Summary (last 24 hours): Intake/Output Summary (Last 24 hours) at 10/01/18 1247  Last data filed at 10/01/18 1210   Gross per 24 hour   Intake              940 ml   Output                0 ml   Net              940 ml       Physical Exam:     Physical Exam   Constitutional: She is oriented to person, place, and time  She appears well-developed and well-nourished  No distress  HENT:   Head: Normocephalic and atraumatic  Eyes: Right eye exhibits no discharge  Left eye exhibits no discharge  Neck: No JVD present  Cardiovascular: Normal rate, regular rhythm, normal heart sounds and intact distal pulses  Exam reveals no gallop and no friction rub  No murmur heard  Pulmonary/Chest: Effort normal and breath sounds normal  No stridor  No respiratory distress  She has no wheezes  She has no rales  She exhibits no tenderness  Abdominal: Soft  Bowel sounds are normal  She exhibits no distension  There is no tenderness  There is no rebound and no guarding  Musculoskeletal: She exhibits no edema  Neurological: She is alert and oriented to person, place, and time  Skin: Skin is warm and dry  She is not diaphoretic     Psychiatric:   Anxious        Additional Data:     Labs:      Results from last 7 days  Lab Units 18  0646   WBC Thousand/uL 8 30   HEMOGLOBIN g/dL 10 8*   HEMATOCRIT % 34 2*   PLATELETS Thousands/uL 313 NEUTROS PCT % 60   LYMPHS PCT % 31   MONOS PCT % 8   EOS PCT % 1       Results from last 7 days  Lab Units 09/28/18  0646   SODIUM mmol/L 139   POTASSIUM mmol/L 3 6   CHLORIDE mmol/L 103   CO2 mmol/L 27   BUN mg/dL 12   CREATININE mg/dL 0 58*   ANION GAP mmol/L 9   CALCIUM mg/dL 9 1   ALBUMIN g/dL 4 2   TOTAL BILIRUBIN mg/dL 0 40   ALK PHOS U/L 62   ALT U/L 26   AST U/L 19                           * I Have Reviewed All Lab Data Listed Above  * Additional Pertinent Lab Tests Reviewed:  Most recent labs reviewed    Imaging:    Imaging Reports Reviewed Today Include: none  Imaging Personally Reviewed by Myself Includes:  none    Recent Cultures (last 7 days):           Last 24 Hours Medication List:     Current Facility-Administered Medications:  aspirin 325 mg Oral Daily Tyron Alvarado MD   atorvastatin 40 mg Oral QPM Dolores Almanzar, LUIZA   diphenhydrAMINE 50 mg Intramuscular Q4H PRN Bennye Negra, PA-C   docusate sodium 100 mg Oral BID PRN Paullette Nailer, PA-C   doxepin 25 mg Oral HS Bennye Negra, PA-C   enoxaparin 40 mg Subcutaneous Q24H [address] Ashlee Johns Roxie, PA-C   gabapentin 300 mg Oral TID Bennye Negra, PA-C   lactulose 20 g Oral BID PRN Paullette Nailer, PA-C   levothyroxine 137 mcg Oral Early Morning Ashlee Johns Roxie, PA-C   LORazepam 0 5 mg Intramuscular Q4H PRN Bennye Negra, PA-C   LORazepam 0 5 mg Oral Q4H PRN Bennye Negra, PA-C   melatonin 6 mg Oral HS PRN Bennye Negra, PA-C   nicotine 1 patch Transdermal Daily DWIGHT Jamil-TRISTIAN   nicotine polacrilex 2 mg Oral Q4H PRN Bennye Negra, PA-C   OLANZapine 5 mg Intramuscular Q4H PRN Bennye Negra, PA-C   OLANZapine 5 mg Oral Q4H PRN Bennye Negra, PA-C   PARoxetine 20 mg Oral Daily Tyron Alvarado MD   polyethylene glycol 17 g Oral Daily PRN NADEEN Jones   QUEtiapine 50 mg Oral BID Paullette Nailer, PA-C   senna-docusate sodium 1 tablet Oral HS NADEEN Jones   senna-docusate sodium 1 tablet Oral BID PRN Edward Marie PA-C   traZODone 50 mg Oral HS PRN Latrell Lau PA-C        Today, Patient Was Seen By: NADEEN Morrison

## 2018-10-01 NOTE — ASSESSMENT & PLAN NOTE
· Hx of CVA likely secondary to moyamoya disease; patient reportedly believes she had craniotomy/revascularization in 2017 for blocked intracranial vessel  Most recent stroke was in August, per previous documentation  · Patient denies residual deficit other than reduced vision    · Continue ASA, statin, and  lovenox for dvt ppx  · EKG NSR

## 2018-10-01 NOTE — PLAN OF CARE
Problem: Alteration in Thoughts and Perception  Goal: Treatment Goal: Gain control of psychotic behaviors/thinking, reduce/eliminate presenting symptoms and demonstrate improved reality functioning upon discharge  Outcome: Progressing    Goal: Verbalize thoughts and feelings  Interventions:  - Promote a nonjudgmental and trusting relationship with the patient through active listening and therapeutic communication  - Assess patient's level of functioning, behavior and potential for risk  - Engage patient in 1 on 1 interactions for a minimum of 15 minutes each session  - Encourage patient to express fears, feelings, frustrations, and discuss symptoms    - [place] patient to reality, help patient recognize reality-based thinking   - Administer medications as ordered and assess for potential side effects  - Provide the patient education related to the signs and symptoms of the illness and desired effects of prescribed medications   Outcome: Progressing    Goal: Refrain from acting on delusional thinking/internal stimuli  Interventions:  - Monitor patient closely, per order   - Utilize least restrictive measures   - Set reasonable limits, give positive feedback for acceptable   - Administer medications as ordered and monitor of potential side effects   Outcome: Progressing    Goal: Agree to be compliant with medication regime, as prescribed and report medication side effects  Interventions:  - Offer appropriate PRN medication and supervise ingestion; conduct aims, as needed    Outcome: Progressing    Goal: Attend and participate in unit activities, including therapeutic, recreational, and educational groups  Interventions:  - Provide therapeutic and educational activities daily, encourage attendance and participation, and document same in the medical record    Outcome: Progressing    Goal: Recognize dysfunctional thoughts, communicate reality-based thoughts at the time of discharge  Interventions:  - Provide medication and psycho-education to assist patient in compliance and developing insight into his/her illness    Outcome: Progressing    Goal: Complete daily ADLs, including personal hygiene independently, as able  Interventions:  - Observe, teach, and assist patient with ADLS  - Monitor and promote a balance of rest/activity, with adequate nutrition and elimination    Outcome: Progressing      Problem: Ineffective Coping  Goal: Cooperates with admission process  Interventions:   - Complete admission process   Outcome: Progressing    Goal: Identifies ineffective coping skills  Outcome: Progressing    Goal: Identifies healthy coping skills  Outcome: Progressing    Goal: Demonstrates healthy coping skills  Outcome: Progressing    Goal: Participates in unit activities  Interventions:  - Provide therapeutic environment   - Provide required programming   - Redirect inappropriate behaviors    Outcome: Progressing    Goal: Patient/Family participate in treatment and DC plans  Interventions:  - Provide therapeutic environment   Outcome: Progressing    Goal: Patient/Family verbalizes awareness of resources  Outcome: Progressing    Goal: Understands least restrictive measures  Interventions:  - Utilize least restrictive behavior   Outcome: Progressing    Goal: Free from restraint events  - Utilize least restrictive measures   - Provide behavioral interventions   - Redirect inappropriate behaviors    Outcome: Progressing      Problem: Risk for Self Injury/Neglect  Goal: Treatment Goal: Remain safe during length of stay, learn and adopt new coping skills, and be free of self-injurious ideation, impulses and acts at the time of discharge  Outcome: Progressing    Goal: Verbalize thoughts and feelings  Interventions:  - Assess and re-assess patient's lethality and potential for self-injury  - Engage patient in 1:1 interactions, daily, for a minimum of 15 minutes  - Encourage patient to express feelings, fears, frustrations, hopes  - Establish rapport/trust with patient    Outcome: Progressing    Goal: Refrain from harming self  Interventions:  - Monitor patient closely, per order  - Develop a trusting relationship  - Supervise medication ingestion, monitor effects and side effects    Outcome: Progressing    Goal: Attend and participate in unit activities, including therapeutic, recreational, and educational groups  Interventions:  - Provide therapeutic and educational activities daily, encourage attendance and participation, and document same in the medical record  - Obtain collateral information, encourage visitation and family involvement in care    Outcome: Progressing    Goal: Recognize maladaptive responses and adopt new coping mechanisms  Outcome: Progressing    Goal: Complete daily ADLs, including personal hygiene independently, as able  Interventions:  - Observe, teach, and assist patient with ADLS  - Monitor and promote a balance of rest/activity, with adequate nutrition and elimination   Outcome: Progressing      Problem: Depression  Goal: Treatment Goal: Demonstrate behavioral control of depressive symptoms, verbalize feelings of improved mood/affect, and adopt new coping skills prior to discharge  Outcome: Progressing    Goal: Verbalize thoughts and feelings  Interventions:  - Assess and re-assess patient's level of risk   - Engage patient in 1:1 interactions, daily, for a minimum of 15 minutes   - Encourage patient to express feelings, fears, frustrations, hopes    Outcome: Progressing    Goal: Refrain from harming self  Interventions:  - Monitor patient closely, per order   - Supervise medication ingestion, monitor effects and side effects    Outcome: Progressing    Goal: Refrain from isolation  Interventions:  - Develop a trusting relationship   - Encourage socialization    Outcome: Progressing    Goal: Refrain from self-neglect  Outcome: Progressing    Goal: Attend and participate in unit activities, including therapeutic, recreational, and educational groups  Interventions:  - Provide therapeutic and educational activities daily, encourage attendance and participation, and document same in the medical record    Outcome: Progressing    Goal: Complete daily ADLs, including personal hygiene independently, as able  Interventions:  - Observe, teach, and assist patient with ADLS  -  Monitor and promote a balance of rest/activity, with adequate nutrition and elimination    Outcome: Progressing      Problem: Anxiety  Goal: Anxiety is at manageable level  Interventions:  - Assess and monitor patient's anxiety level  - Monitor for signs and symptoms of anxiety both physical and emotional (heart palpitations, chest pain, shortness of breath, headaches, nausea, feeling jumpy, restlessness, irritable, apprehensive)  - Collaborate with interdisciplinary team and initiate plan and interventions as ordered    - [place]uddy patient to unit/surroundings  - Explain treatment plan  - Encourage participation in care  - Encourage verbalization of concerns/fears  - Identify coping mechanisms  - Assist in developing anxiety-reducing skills  - Administer/offer alternative therapies  - Limit or eliminate stimulants   Outcome: Progressing      Problem: Risk for Violence/Aggression Toward Others  Goal: Treatment Goal: Refrain from acts of violence/aggression during length of stay, and demonstrate improved impulse control at the time of discharge  Outcome: Progressing    Goal: Verbalize thoughts and feelings  Interventions:  - Assess and re-assess patient's level of risk, every waking shift  - Engage patient in 1:1 interactions, daily, for a minimum of 15 minutes   - Allow patient to express feelings and frustrations in a safe and non-threatening manner   - Establish rapport/trust with patient    Outcome: Progressing    Goal: Refrain from harming others  Outcome: Progressing    Goal: Refrain from destructive acts on the environment or property  Outcome: Progressing    Goal: Control angry outbursts  Interventions:  - Monitor patient closely, per order  - Ensure early verbal de-escalation  - Monitor prn medication needs  - Set reasonable/therapeutic limits, outline behavioral expectations, and consequences   - Provide a non-threatening milieu, utilizing the least restrictive interventions    Outcome: Progressing    Goal: Attend and participate in unit activities, including therapeutic, recreational, and educational groups  Interventions:  - Provide therapeutic and educational activities daily, encourage attendance and participation, and document same in the medical record    Outcome: Progressing    Goal: Identify appropriate positive anger management techniques  Interventions:  - Offer anger management and coping skills groups   - Staff will provide positive feedback for appropriate anger control   Outcome: Progressing
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
## 2018-10-01 NOTE — PROGRESS NOTES
Patient very confused at times  Spoken to patient at least 7 times , very forgetful , states ''  Nobody doing anything for me to get a nice housing   why are you holding me here '' Noted BM charted for 9/26/18  Assessed abdomen distended   Normoactive  Passing gas at  times  This morning  patient received senna and carla lax with morning med's  Currently offered prune juice because patient wants to wait till night   Med  And meal compliant

## 2018-10-01 NOTE — SOCIAL WORK
SW left voicemail for pt ACT worker Ginette Chambers about setting up housing interview Wed at [facility] for pt  SW will continue to follow up  no dyspnea/no wheezing/no cough/no pleuritic chest pain/no hemoptysis

## 2018-10-01 NOTE — SOCIAL WORK
303 hearing held today with Orestes Dumont will be committed for up to 20 days  If a 304 hearing is needed paperwork will need to be submitted by Fri Oct 12th for a hearing to be held on Fri Oct 19th  SW will continue to follow up

## 2018-10-01 NOTE — PROGRESS NOTES
Patient calm on approach and denies SI  She states that she will "have a court meeting on Monday morning at 8 am and is asking who will go with her and how she will be transported"  Im not aware of any court meeting, and told patient that the  will talk to her about it on Monday  She is also saying that she had a miscarriage recently  Patient ambulates independently  Denies pain, SOB, dizziness  Vital signs stable  Patient is compliant with medication  Had nicotine gum, and trazodone given as ordered at 21:36  No further complaints   Will continue to monitor per protocol

## 2018-10-02 PROCEDURE — 93010 ELECTROCARDIOGRAM REPORT: CPT | Performed by: INTERNAL MEDICINE

## 2018-10-02 RX ORDER — BISACODYL 10 MG
10 SUPPOSITORY, RECTAL RECTAL DAILY PRN
Status: DISCONTINUED | OUTPATIENT
Start: 2018-10-02 | End: 2018-10-12 | Stop reason: HOSPADM

## 2018-10-02 RX ADMIN — GABAPENTIN 300 MG: 300 CAPSULE ORAL at 20:10

## 2018-10-02 RX ADMIN — DOXEPIN HYDROCHLORIDE 25 MG: 25 CAPSULE ORAL at 20:09

## 2018-10-02 RX ADMIN — QUETIAPINE 50 MG: 50 TABLET ORAL at 08:22

## 2018-10-02 RX ADMIN — NICOTINE 1 PATCH: 14 PATCH, EXTENDED RELEASE TRANSDERMAL at 08:23

## 2018-10-02 RX ADMIN — BISACODYL 10 MG: 10 SUPPOSITORY RECTAL at 08:22

## 2018-10-02 RX ADMIN — QUETIAPINE 50 MG: 50 TABLET ORAL at 17:21

## 2018-10-02 RX ADMIN — ASPIRIN 325 MG ORAL TABLET 325 MG: 325 PILL ORAL at 08:22

## 2018-10-02 RX ADMIN — PAROXETINE 20 MG: 20 TABLET, FILM COATED ORAL at 08:23

## 2018-10-02 RX ADMIN — GABAPENTIN 300 MG: 300 CAPSULE ORAL at 08:22

## 2018-10-02 RX ADMIN — GABAPENTIN 300 MG: 300 CAPSULE ORAL at 16:25

## 2018-10-02 RX ADMIN — NICOTINE POLACRILEX 2 MG: 2 GUM, CHEWING ORAL at 15:00

## 2018-10-02 RX ADMIN — SENNOSIDES AND DOCUSATE SODIUM 1 TABLET: 8.6; 5 TABLET ORAL at 20:10

## 2018-10-02 RX ADMIN — LEVOTHYROXINE SODIUM 137 MCG: 112 TABLET ORAL at 05:53

## 2018-10-02 RX ADMIN — ATORVASTATIN CALCIUM 40 MG: 40 TABLET, FILM COATED ORAL at 17:21

## 2018-10-02 RX ADMIN — ENOXAPARIN SODIUM 40 MG: 40 INJECTION SUBCUTANEOUS at 08:22
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
## 2018-10-02 NOTE — PROGRESS NOTES
Patient is now in her room wants to sleep  Was at dayroom most of the time during evening  Is pleasant with approach however seems confused and forgetful  Patient informed that she is doing good and still waiting for bowel movements  Provided teaching about bowel management and patient will try those approach before taking PRN  Patient has a nontender abdomen and also not distended  Patient asked to save back her personal belongs because boyfriend will pick this up tomorrow  Patient denies voices or suicidal thoughts or plans  Will continue to monitor for safety and support

## 2018-10-02 NOTE — PROGRESS NOTES
Med compliant  States she slept well  Denies SI's  Talking about her father and missing him  No aggressive behaviors

## 2018-10-02 NOTE — SOCIAL WORK
BA left voicemail for pt BCM Madina Dies to see if its an option for pt to go to her sisters or mothers at DC as a secondary DC plan to SNF  Pt stated she cannot but BA wanted to confirm with pt  BCM  BA will continue to follow up

## 2018-10-02 NOTE — PROGRESS NOTES
Pt awake, alert, visible on the unit, social with certain peers, medications and meal compliant  Jose Greco at times, with flight of ideas, irritable at times  No attempts at self harm noted or reported, will continue to monitor on SPI protocol
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
## 2018-10-02 NOTE — PROGRESS NOTES
Monitored on Q 15 minute safety checks, no acts of self harm noted    Currently in bed with eyes closed and respirations noted, appears to be sleeping  On assault, fall and SP1 precautions, not voicing any SI's, no assaultive behaviors noted
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
## 2018-10-02 NOTE — PROGRESS NOTES
Patient confused , unable to  focus , flight of ideas  Patient not safe alone out in the community  Spoke to Social service   Poor insight  Unclean   suppository administered this am with positive effect still more to go   Med and meal compliant
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
## 2018-10-03 PROCEDURE — 97167 OT EVAL HIGH COMPLEX 60 MIN: CPT

## 2018-10-03 PROCEDURE — 97150 GROUP THERAPEUTIC PROCEDURES: CPT

## 2018-10-03 RX ORDER — DIVALPROEX SODIUM 500 MG/1
500 TABLET, EXTENDED RELEASE ORAL DAILY
Status: DISCONTINUED | OUTPATIENT
Start: 2018-10-03 | End: 2018-10-06

## 2018-10-03 RX ADMIN — QUETIAPINE 50 MG: 50 TABLET ORAL at 08:34

## 2018-10-03 RX ADMIN — ENOXAPARIN SODIUM 40 MG: 40 INJECTION SUBCUTANEOUS at 08:34

## 2018-10-03 RX ADMIN — ATORVASTATIN CALCIUM 40 MG: 40 TABLET, FILM COATED ORAL at 17:04

## 2018-10-03 RX ADMIN — GABAPENTIN 300 MG: 300 CAPSULE ORAL at 08:34

## 2018-10-03 RX ADMIN — LEVOTHYROXINE SODIUM 137 MCG: 112 TABLET ORAL at 05:56

## 2018-10-03 RX ADMIN — SENNOSIDES AND DOCUSATE SODIUM 1 TABLET: 8.6; 5 TABLET ORAL at 21:22

## 2018-10-03 RX ADMIN — QUETIAPINE 50 MG: 50 TABLET ORAL at 17:04

## 2018-10-03 RX ADMIN — NICOTINE 1 PATCH: 14 PATCH, EXTENDED RELEASE TRANSDERMAL at 08:34

## 2018-10-03 RX ADMIN — GABAPENTIN 300 MG: 300 CAPSULE ORAL at 21:22

## 2018-10-03 RX ADMIN — LORAZEPAM 0.5 MG: 0.5 TABLET ORAL at 18:23

## 2018-10-03 RX ADMIN — GABAPENTIN 300 MG: 300 CAPSULE ORAL at 17:05

## 2018-10-03 RX ADMIN — DIVALPROEX SODIUM 500 MG: 500 TABLET, FILM COATED, EXTENDED RELEASE ORAL at 11:15

## 2018-10-03 RX ADMIN — PAROXETINE 20 MG: 20 TABLET, FILM COATED ORAL at 08:34

## 2018-10-03 RX ADMIN — DOXEPIN HYDROCHLORIDE 25 MG: 25 CAPSULE ORAL at 21:22

## 2018-10-03 RX ADMIN — ASPIRIN 325 MG ORAL TABLET 325 MG: 325 PILL ORAL at 08:34

## 2018-10-03 RX ADMIN — NICOTINE POLACRILEX 2 MG: 2 GUM, CHEWING ORAL at 13:54

## 2018-10-03 NOTE — PROGRESS NOTES
Patient denies suicidal ideation  Very disorganized  Takes shower every day but still unclean  Med and meal compliant

## 2018-10-03 NOTE — PROGRESS NOTES
Pt appears to be sleeping well through the night, no distress noted, no problem behaviors noted or reported, Q15 min checks, SPI precautions maintained, will continue to monitor
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
## 2018-10-03 NOTE — PLAN OF CARE
Anxiety     Anxiety is at manageable level Not Progressing        Ineffective Coping     Identifies healthy coping skills Not Progressing          Alteration in Thoughts and Perception     Treatment Goal: Gain control of psychotic behaviors/thinking, reduce/eliminate presenting symptoms and demonstrate improved reality functioning upon discharge Progressing     Verbalize thoughts and feelings Progressing     Refrain from acting on delusional thinking/internal stimuli Progressing     Agree to be compliant with medication regime, as prescribed and report medication side effects Progressing     Attend and participate in unit activities, including therapeutic, recreational, and educational groups Progressing     Recognize dysfunctional thoughts, communicate reality-based thoughts at the time of discharge Progressing     Complete daily ADLs, including personal hygiene independently, as able Progressing        Depression     Treatment Goal: Demonstrate behavioral control of depressive symptoms, verbalize feelings of improved mood/affect, and adopt new coping skills prior to discharge Progressing     Verbalize thoughts and feelings Progressing     Refrain from harming self Progressing     Refrain from isolation Progressing     Refrain from self-neglect Progressing     Attend and participate in unit activities, including therapeutic, recreational, and educational groups Progressing     Complete daily ADLs, including personal hygiene independently, as able Progressing        Ineffective Coping     Cooperates with admission process Progressing     Identifies ineffective coping skills Progressing     Demonstrates healthy coping skills Progressing     Participates in unit activities Progressing     Patient/Family participate in treatment and DC plans Progressing     Patient/Family verbalizes awareness of resources Progressing     Understands least restrictive measures Progressing     Free from restraint events Progressing        Risk for Self Injury/Neglect     Treatment Goal: Remain safe during length of stay, learn and adopt new coping skills, and be free of self-injurious ideation, impulses and acts at the time of discharge Progressing     Verbalize thoughts and feelings Progressing     Refrain from harming self Progressing     Attend and participate in unit activities, including therapeutic, recreational, and educational groups Progressing     Recognize maladaptive responses and adopt new coping mechanisms Progressing     Complete daily ADLs, including personal hygiene independently, as able Progressing        Risk for Violence/Aggression Toward Others     Treatment Goal: Refrain from acts of violence/aggression during length of stay, and demonstrate improved impulse control at the time of discharge Progressing     Verbalize thoughts and feelings Progressing     Refrain from harming others Progressing     Refrain from destructive acts on the environment or property Progressing     Control angry outbursts Progressing     Attend and participate in unit activities, including therapeutic, recreational, and educational groups Progressing     Identify appropriate positive anger management techniques Progressing

## 2018-10-03 NOTE — SOCIAL WORK
BA left message for pt [facility] to  inquire about previous psychiatric records at request of Dr Karthik Jennings and steps needed to to get PORFIRIO from pt to obtain records  SW will continue to follow up

## 2018-10-03 NOTE — PROGRESS NOTES
Patient bizarre   Disorganized thoughts   Holding a straw like a cigarette, states '' that's make less anxious ''  Poor insight   Denies suicidal ideation   Confused and forgetful
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
## 2018-10-03 NOTE — PLAN OF CARE
Problem: OCCUPATIONAL THERAPY ADULT  Goal: Performs self-care activities at highest level of function for planned discharge setting  See evaluation for individualized goals  Treatment Interventions: ADL retraining, Cognitive reorientation, Continued evaluation, Activityengagement (coping skills instruction, life management instruction)          See flowsheet documentation for full assessment, interventions and recommendations  Outcome: Progressing  Limitation: Decreased Safe judgement during ADL, Decreased cognition, Visual deficit, Mood limitation (decreased coping skills, periodic thought disorganization)  Prognosis: Good  Assessment: Pt is a 34 y o  female seen for OT evaluation s/p admit to [facility] on 9/27/2018 w/ Major depression with psychotic features ([facility] [address] )  Comorbidities affecting pt's functional performance at time of assessment include: HTN and moyamoya disease, hx of CVA, tobacco abuse, leukocytosis, hypothyroidism, syncope, dizziness, blurry vision  Personal factors affecting pt at time of IE include:limited home support, behavioral pattern, limited insight into deficits, compliance, decreased initiation and engagement , health management , environment, homeless and decreased coping skills, decreased life management skills, impaired finances  Prior to admission, pt was living in a tent in an area that homeless persons are permitted to live in (per her report)  Upon evaluation: Pt requires treatment with consideration of  the following deficits impacting occupational performance: impaired attention, impaired problem solving, decreased safety awareness, impaired interpersonal skills, decreased coping skills and impaired thought organization, decreased life management skills  Pt to benefit from continued skilled OT tx while in the hospital to address deficits as defined above and maximize level of functional independence w ADL's and functional mobility   Occupational Performance areas to address include: socialization, health maintenance, social participation and coping skills instruction, life management instruction  From OT standpoint, recommendation at time of d/c would be to a supportive living environment that includes appropriate shelter and community supports

## 2018-10-03 NOTE — SOCIAL WORK
SW spoke with pt [facility]  She stated that they have pursued  placement for pt at her mothers house or sister house with no success  Pt has an estranged relationship with her family  They are continuing to pursue and check on the status of pt housing interview as an alternative DC plan  SW will continue to follow up
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
## 2018-10-03 NOTE — PROGRESS NOTES
Pt attended open discussion group  Pt guarded at beginning stating that she was not going to talk  Pt holding a paper like a cigarette, disorganized process and disheveled hair  Pt was eager to attend groups stating that she does not want to miss afternoon group discussion  Pt was respectful and calm with peers  Continue to provide therapeutic group support

## 2018-10-03 NOTE — PROGRESS NOTES
Psychiatry Progress Note    Subjective: Interval History     The patient is very bizarre and disorganized  She is confused at times  She is holding a straw like a cigarette  She has flight of ideas during discussion  She has poor focus  Will start Depakote today with a level for 10/6  Patient is medication and meal compliant and tolerating the recent increase in Seroquel  She has poor insight into why she is here  She denies any hallucinations or suicidal or homicidal ideation      Current medications:    Current Facility-Administered Medications:     aspirin tablet 325 mg, 325 mg, Oral, Daily, Oleksandr White MD, 325 mg at 10/03/18 0834    atorvastatin (LIPITOR) tablet 40 mg, 40 mg, Oral, QPM, Deljodi Faustin PA-C, 40 mg at 10/02/18 1721    bisacodyl (DULCOLAX) rectal suppository 10 mg, 10 mg, Rectal, Daily PRN, Berta Weiss PA-C, 10 mg at 10/02/18 9322    diphenhydrAMINE (BENADRYL) injection 50 mg, 50 mg, Intramuscular, Q4H PRN, Judy Dempsey PA-C    divalproex sodium (DEPAKOTE ER) 24 hr tablet 500 mg, 500 mg, Oral, Daily, Melani Ramsay PA-C    docusate sodium (COLACE) capsule 100 mg, 100 mg, Oral, BID PRN, Nyla Castaneda PA-C, 100 mg at 10/01/18 0844    doxepin (SINEquan) capsule 25 mg, 25 mg, Oral, HS, Judy Dempsey PA-C, 25 mg at 10/02/18 2009    enoxaparin (LOVENOX) subcutaneous injection 40 mg, 40 mg, Subcutaneous, Q24H [facility] Obi Faustin PA-C, 40 mg at 10/03/18 0268    gabapentin (NEURONTIN) capsule 300 mg, 300 mg, Oral, TID, Edyoli Demspey PA-C, 300 mg at 10/03/18 0834    lactulose 20 g/30 mL oral solution 20 g, 20 g, Oral, BID PRN, Nyla Castaneda PA-C    levothyroxine tablet 137 mcg, 137 mcg, Oral, Early Morning, Kendal Perez PA-C, 137 mcg at 10/03/18 0556    LORazepam (ATIVAN) 2 mg/mL injection 0 5 mg, 0 5 mg, Intramuscular, Q4H PRN, Edwinna Course, PA-C    LORazepam (ATIVAN) tablet 0 5 mg, 0 5 mg, Oral, Q4H PRN, Edwinna Course, PA-C, 0 5 mg at 10/01/18 1516    melatonin tablet 6 mg, 6 mg, Oral, HS PRN, [facility]    nicotine (NICODERM CQ) 14 mg/24hr TD 24 hr patch 1 patch, 1 patch, Transdermal, Daily, Jim Mares PA-C, 1 patch at 10/03/18 0033    nicotine polacrilex (NICORETTE) gum 2 mg, 2 mg, Oral, Q4H PRN, [facility], 2 mg at 10/02/18 1500    OLANZapine (ZyPREXA) IM injection 5 mg, 5 mg, Intramuscular, Q4H PRN, [facility]    OLANZapine (ZyPREXA) tablet 5 mg, 5 mg, Oral, Q4H PRN, [facility]    PARoxetine (PAXIL) tablet 20 mg, 20 mg, Oral, Daily, Carmen Oliver MD, 20 mg at 10/03/18 0834    polyethylene glycol (MIRALAX) packet 17 g, 17 g, Oral, Daily PRN, NADEEN Rosen, 17 g at 10/01/18 0845    QUEtiapine (SEROquel) tablet 50 mg, 50 mg, Oral, BID, Keeley Orellana PA-C, 50 mg at 10/03/18 0834    senna-docusate sodium (SENOKOT S) 8 6-50 mg per tablet 1 tablet, 1 tablet, Oral, HS, NADEEN Rosen, 1 tablet at 10/02/18 2010    senna-docusate sodium (SENOKOT S) 8 6-50 mg per tablet 1 tablet, 1 tablet, Oral, BID PRN, Keeley Orellana PA-C, 1 tablet at 10/01/18 0845    traZODone (DESYREL) tablet 50 mg, 50 mg, Oral, HS PRN, [facility], 50 mg at 09/30/18 2136      Objective:     Vital Signs:  Vitals:    10/02/18 0724 10/02/18 0744 10/02/18 1551 10/03/18 0704   BP: (!) 106/45  125/73 109/84   BP Location: Left arm  Left arm Left arm   Pulse: 75  77 90   Resp: 18 18 18   Temp: (!) 97 4 °F (36 3 °C)  98 3 °F (36 8 °C) (!) 97 4 °F (36 3 °C)   TempSrc: Temporal  Temporal Temporal   SpO2: 97%  96% 97%   Weight:  78 kg (172 lb)     Height:             Appearance:  age appropriate and disheveled   Behavior:  evasive and guarded   Speech:  normal volume   Mood:  depressed and irritable   Affect:  constricted and inappropriate   Thought Process:  circumstantial and disorganized   Thought Content:  normal   Perceptual Disturbances: None   Risk Potential: none Sensorium:  person, place, situation and time   Cognition:  grossly intact   Consciousness:  alert and awake    Attention: attention span appeared shorter than expected for age   Intellect: within normal limits   Insight:  poor   Judgment: poor      Motor Activity: no abnormal movements           Recent Labs:  Results Reviewed     None          I/O Past 24 hours:  I/O last 3 completed shifts: In: 1480 [P O :1220]  Out: -   I/O this shift: In: 18 [P O :480]  Out: -         Assessment / Plan:     [facility]    Recommended Treatment:      Medication changes:  1) start Depakote Extended Release 500 mg daily with a level for 10/6  Non-pharmacological treatments  1) Continue with group therapy, milieu therapy and occupational therapy  Safety  1) Safety/communication plan established targeting dynamic risk factors above  2) Risks, benefits, and possible side effects of medications explained to patient and patient verbalizes understanding  Counseling / Coordination of Care    Total floor / unit time spent today 20 minutes  Greater than 50% of total time was spent with the patient and / or family counseling and / or coordination of care  A description of the counseling / coordination of care  Patient's Rights, confidentiality and exceptions to confidentiality, use of automated medical record, [address] [place] [address] staff access to medical record, and consent to treatment reviewed      Jeferson Brooks PA-C
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
## 2018-10-03 NOTE — NURSING NOTE
Patient is awake, alert, and oriented  Patient is compliant with meals and medications  Patient is pacing the halls while holding a straw like a cigarette  Patient continues to ask staff, "Why am I here? Everyone is ignoring me " Redirection, reassurance, and Therapeutic communication provided  Patient continued to pace the halls, agitated, and restless  Patient request PRN Ativan at [phone] for 4/4 anxiety  Patient denies SI/VH/AHs at this time  SP1 precautions maintained

## 2018-10-04 RX ADMIN — NICOTINE POLACRILEX 2 MG: 2 GUM, CHEWING ORAL at 12:45

## 2018-10-04 RX ADMIN — SENNOSIDES AND DOCUSATE SODIUM 1 TABLET: 8.6; 5 TABLET ORAL at 21:53

## 2018-10-04 RX ADMIN — ATORVASTATIN CALCIUM 40 MG: 40 TABLET, FILM COATED ORAL at 17:34

## 2018-10-04 RX ADMIN — DIVALPROEX SODIUM 500 MG: 500 TABLET, FILM COATED, EXTENDED RELEASE ORAL at 09:01

## 2018-10-04 RX ADMIN — LEVOTHYROXINE SODIUM 137 MCG: 112 TABLET ORAL at 05:38

## 2018-10-04 RX ADMIN — QUETIAPINE 50 MG: 50 TABLET ORAL at 09:01

## 2018-10-04 RX ADMIN — GABAPENTIN 300 MG: 300 CAPSULE ORAL at 21:53

## 2018-10-04 RX ADMIN — GABAPENTIN 300 MG: 300 CAPSULE ORAL at 09:01

## 2018-10-04 RX ADMIN — NICOTINE POLACRILEX 2 MG: 2 GUM, CHEWING ORAL at 17:33

## 2018-10-04 RX ADMIN — ENOXAPARIN SODIUM 40 MG: 40 INJECTION SUBCUTANEOUS at 09:01

## 2018-10-04 RX ADMIN — NICOTINE 1 PATCH: 14 PATCH, EXTENDED RELEASE TRANSDERMAL at 09:01

## 2018-10-04 RX ADMIN — PAROXETINE 20 MG: 20 TABLET, FILM COATED ORAL at 09:01

## 2018-10-04 RX ADMIN — GABAPENTIN 300 MG: 300 CAPSULE ORAL at 17:34

## 2018-10-04 RX ADMIN — ASPIRIN 325 MG ORAL TABLET 325 MG: 325 PILL ORAL at 09:01

## 2018-10-04 RX ADMIN — DOXEPIN HYDROCHLORIDE 25 MG: 25 CAPSULE ORAL at 21:53

## 2018-10-04 RX ADMIN — QUETIAPINE 50 MG: 50 TABLET ORAL at 17:34

## 2018-10-04 NOTE — PROGRESS NOTES
Med compliant this AM  Denies SI's, no self harming acts noted on Q 15 minute safety checks  Slept well

## 2018-10-04 NOTE — NURSING NOTE
Patient is awake, alert, and oriented  Patient continues to be bizarre at times and disheveled  Patient denies SI's at this time  Patient is compliant with meals and medications  SP1 precautions maintained

## 2018-10-04 NOTE — NURSING NOTE
Patient currently in bed with eyes closed  No s/s of self-harm or distress noted  SP1 precautions maintained

## 2018-10-04 NOTE — SOCIAL WORK
BA left message for pt YASSINE fontaine to see if group home would be an appropriate  DC plan  SW will continue to follow up

## 2018-10-04 NOTE — PROGRESS NOTES
Psychiatry Progress Note    Subjective: Interval History     The patient continues to be bizarre and disheveled  She is lying in her bed this morning  She has poor insight into why she is in the hospital   She has been pacing the hallways and holding a straw like a cigarette  She needs frequent redirection  She can be agitated at times  Patient started on Depakote yesterday with a level for 10/6  She is medication and meal compliant and tolerating them well  She had Ativan p r n  yesterday at 6:23 p m  Rona Le She has poor eye contact during discussion  She states she slept  She denies any hallucinations or suicidal ideations      Current medications:    Current Facility-Administered Medications:     aspirin tablet 325 mg, 325 mg, Oral, Daily, Milagro Rodriguez MD, 325 mg at 10/04/18 0901    atorvastatin (LIPITOR) tablet 40 mg, 40 mg, Oral, QPM, Denise Faustin PA-C, 40 mg at 10/03/18 1704    bisacodyl (DULCOLAX) rectal suppository 10 mg, 10 mg, Rectal, Daily PRN, Corinne Ball PA-C, 10 mg at 10/02/18 2331    diphenhydrAMINE (BENADRYL) injection 50 mg, 50 mg, Intramuscular, Q4H PRN, Jose Cotton PA-C    divalproex sodium (DEPAKOTE ER) 24 hr tablet 500 mg, 500 mg, Oral, Daily, Corinne Ball PA-C, 500 mg at 10/04/18 0901    docusate sodium (COLACE) capsule 100 mg, 100 mg, Oral, BID PRN, Hollis Loza PA-C, 100 mg at 10/01/18 0844    doxepin (SINEquan) capsule 25 mg, 25 mg, Oral, HS, Jose Cotton PA-C, 25 mg at 10/03/18 2122    enoxaparin (LOVENOX) subcutaneous injection 40 mg, 40 mg, Subcutaneous, Q24H [facility], Denise Faustin PA-C, 40 mg at 10/04/18 0901    gabapentin (NEURONTIN) capsule 300 mg, 300 mg, Oral, TID, Jose Cotton PA-C, 300 mg at 10/04/18 0901    lactulose 20 g/30 mL oral solution 20 g, 20 g, Oral, BID PRN, Hollis Loza PA-C    levothyroxine tablet 137 mcg, 137 mcg, Oral, Early Morning, Janell Saenz PA-C, 137 mcg at 10/04/18 0538    LORazepam (ATIVAN) 2 mg/mL injection 0 5 mg, 0 5 mg, Intramuscular, Q4H PRN, Kimi Gamez PA-C    LORazepam (ATIVAN) tablet 0 5 mg, 0 5 mg, Oral, Q4H PRN, Kimi Gamez PA-C, 0 5 mg at 10/03/18 1823    melatonin tablet 6 mg, 6 mg, Oral, HS PRN, Kimi Gamez PA-C    nicotine (NICODERM CQ) 14 mg/24hr TD 24 hr patch 1 patch, 1 patch, Transdermal, Daily, Sharmaine Benavides PA-C, 1 patch at 10/04/18 0901    nicotine polacrilex (NICORETTE) gum 2 mg, 2 mg, Oral, Q4H PRN, Kimi Gamez PA-C, 2 mg at 10/03/18 1354    OLANZapine (ZyPREXA) IM injection 5 mg, 5 mg, Intramuscular, Q4H PRN, Kimi Gamez PA-C    OLANZapine (ZyPREXA) tablet 5 mg, 5 mg, Oral, Q4H PRN, Kimi Gamez PA-C    PARoxetine (PAXIL) tablet 20 mg, 20 mg, Oral, Daily, Young Johnson MD, 20 mg at 10/04/18 0901    polyethylene glycol (MIRALAX) packet 17 g, 17 g, Oral, Daily PRN, NADEEN Munoz, 17 g at 10/01/18 0845    QUEtiapine (SEROquel) tablet 50 mg, 50 mg, Oral, BID, Umm Alicea PA-C, 50 mg at 10/04/18 0901    senna-docusate sodium (SENOKOT S) 8 6-50 mg per tablet 1 tablet, 1 tablet, Oral, HS, NADEEN Munoz, 1 tablet at 10/03/18 2122    senna-docusate sodium (SENOKOT S) 8 6-50 mg per tablet 1 tablet, 1 tablet, Oral, BID PRN, Umm Alicea PA-C, 1 tablet at 10/01/18 0845    traZODone (DESYREL) tablet 50 mg, 50 mg, Oral, HS PRN, Kimi Gamez PA-C, 50 mg at 09/30/18 2136      Objective:     Vital Signs:  Vitals:    10/02/18 1551 10/03/18 0704 10/03/18 1607 10/04/18 0730   BP: 125/73 109/84 123/53 118/71   BP Location: Left arm Left arm Right arm Left arm   Pulse: 77 90 84 85   Resp: 18 18 20 18   Temp: 98 3 °F (36 8 °C) (!) 97 4 °F (36 3 °C) (!) 96 8 °F (36 °C) 97 9 °F (36 6 °C)   TempSrc: Temporal Temporal Temporal Temporal   SpO2: 96% 97% 100% 98%   Weight:       Height:             Appearance:  age appropriate and disheveled   Behavior:  evasive and guarded   Speech:  normal volume   Mood: depressed and irritable   Affect:  constricted and inappropriate   Thought Process:  circumstantial and disorganized   Thought Content:  normal   Perceptual Disturbances: None   Risk Potential: none   Sensorium:  person, place, situation and time   Cognition:  grossly intact   Consciousness:  alert and awake    Attention: attention span appeared shorter than expected for age   Intellect: within normal limits   Insight:  poor   Judgment: poor      Motor Activity: no abnormal movements           Recent Labs:  Results Reviewed     None          I/O Past 24 hours:  I/O last 3 completed shifts: In: 1440 [P O :1440]  Out: 500 [Urine:500]  I/O this shift:  In: 180 [P O :180]  Out: -         Assessment / Plan:     Major depression with psychotic features ([facility] [address] )    Recommended Treatment:      Medication changes:  1) Depakote started yesterday with a level for 10/6  Non-pharmacological treatments  1) Continue with group therapy, milieu therapy and occupational therapy  Safety  1) Safety/communication plan established targeting dynamic risk factors above  2) Risks, benefits, and possible side effects of medications explained to patient and patient verbalizes understanding  Counseling / Coordination of Care    Total floor / unit time spent today 20 minutes  Greater than 50% of total time was spent with the patient and / or family counseling and / or coordination of care  A description of the counseling / coordination of care  Patient's Rights, confidentiality and exceptions to confidentiality, use of automated medical record, [facility] staff access to medical record, and consent to treatment reviewed      Juliet Bauman PA-C

## 2018-10-04 NOTE — SOCIAL WORK
Writer completed and faxed in [id number] [facility] to [facility]

## 2018-10-04 NOTE — PROGRESS NOTES
Observed in bed with eyes closed and respirations noted  Monitored on Q 15 minute safety checks, no self harming actions noted  Not voicing any SI's, on SP1 protocol

## 2018-10-04 NOTE — PLAN OF CARE
Anxiety     Anxiety is at manageable level Not Progressing        Depression     Refrain from isolation Not Progressing        Ineffective Coping     Identifies healthy coping skills Not Progressing          Alteration in Thoughts and Perception     Treatment Goal: Gain control of psychotic behaviors/thinking, reduce/eliminate presenting symptoms and demonstrate improved reality functioning upon discharge Progressing     Verbalize thoughts and feelings Progressing     Refrain from acting on delusional thinking/internal stimuli Progressing     Agree to be compliant with medication regime, as prescribed and report medication side effects Progressing     Attend and participate in unit activities, including therapeutic, recreational, and educational groups Progressing     Recognize dysfunctional thoughts, communicate reality-based thoughts at the time of discharge Progressing     Complete daily ADLs, including personal hygiene independently, as able Progressing        Depression     Treatment Goal: Demonstrate behavioral control of depressive symptoms, verbalize feelings of improved mood/affect, and adopt new coping skills prior to discharge Progressing     Verbalize thoughts and feelings Progressing     Refrain from harming self Progressing     Refrain from self-neglect Progressing     Attend and participate in unit activities, including therapeutic, recreational, and educational groups Progressing     Complete daily ADLs, including personal hygiene independently, as able Progressing        Ineffective Coping     Cooperates with admission process Progressing     Identifies ineffective coping skills Progressing     Demonstrates healthy coping skills Progressing     Participates in unit activities Progressing     Patient/Family participate in treatment and DC plans Progressing     Patient/Family verbalizes awareness of resources Progressing     Understands least restrictive measures Progressing  Free from restraint events Progressing        Risk for Self Injury/Neglect     Treatment Goal: Remain safe during length of stay, learn and adopt new coping skills, and be free of self-injurious ideation, impulses and acts at the time of discharge Progressing     Verbalize thoughts and feelings Progressing     Refrain from harming self Progressing     Attend and participate in unit activities, including therapeutic, recreational, and educational groups Progressing     Recognize maladaptive responses and adopt new coping mechanisms Progressing     Complete daily ADLs, including personal hygiene independently, as able Progressing        Risk for Violence/Aggression Toward Others     Treatment Goal: Refrain from acts of violence/aggression during length of stay, and demonstrate improved impulse control at the time of discharge Progressing     Verbalize thoughts and feelings Progressing     Refrain from harming others Progressing     Refrain from destructive acts on the environment or property Progressing     Control angry outbursts Progressing     Attend and participate in unit activities, including therapeutic, recreational, and educational groups Progressing     Identify appropriate positive anger management techniques Progressing

## 2018-10-04 NOTE — PROGRESS NOTES
Patient awake,alert, bizarre  Confused at times  states '' I want to change this [employer] business an d go home '' med and meal compliant

## 2018-10-05 RX ADMIN — ENOXAPARIN SODIUM 40 MG: 40 INJECTION SUBCUTANEOUS at 08:09

## 2018-10-05 RX ADMIN — SENNOSIDES AND DOCUSATE SODIUM 1 TABLET: 8.6; 5 TABLET ORAL at 21:22

## 2018-10-05 RX ADMIN — PAROXETINE 20 MG: 20 TABLET, FILM COATED ORAL at 08:09

## 2018-10-05 RX ADMIN — NICOTINE POLACRILEX 2 MG: 2 GUM, CHEWING ORAL at 09:31

## 2018-10-05 RX ADMIN — ATORVASTATIN CALCIUM 40 MG: 40 TABLET, FILM COATED ORAL at 17:06

## 2018-10-05 RX ADMIN — QUETIAPINE 50 MG: 50 TABLET ORAL at 17:06

## 2018-10-05 RX ADMIN — GABAPENTIN 300 MG: 300 CAPSULE ORAL at 15:46

## 2018-10-05 RX ADMIN — GABAPENTIN 300 MG: 300 CAPSULE ORAL at 21:22

## 2018-10-05 RX ADMIN — GABAPENTIN 300 MG: 300 CAPSULE ORAL at 08:09

## 2018-10-05 RX ADMIN — DIVALPROEX SODIUM 500 MG: 500 TABLET, FILM COATED, EXTENDED RELEASE ORAL at 08:08

## 2018-10-05 RX ADMIN — NICOTINE 1 PATCH: 14 PATCH, EXTENDED RELEASE TRANSDERMAL at 08:15

## 2018-10-05 RX ADMIN — LEVOTHYROXINE SODIUM 137 MCG: 112 TABLET ORAL at 06:39

## 2018-10-05 RX ADMIN — DOXEPIN HYDROCHLORIDE 25 MG: 25 CAPSULE ORAL at 21:22

## 2018-10-05 RX ADMIN — ASPIRIN 325 MG ORAL TABLET 325 MG: 325 PILL ORAL at 08:08

## 2018-10-05 RX ADMIN — QUETIAPINE 50 MG: 50 TABLET ORAL at 08:08

## 2018-10-05 NOTE — NURSING NOTE
Patient is compliant with medications and meals  Appetite is good with patient eating 100% of breakfast   No complaint of pain or discomfort  Patient requested Nicotine Gum for smoking urges and was given PRN Nicotine Gum at  with good results noted and no further complaint of urges  Patient is visible on the unit and interacts with several peers without difficulty  Patient denies suicidal ideation and states she was never suicidal   Patient is on suicide, assault and fall precautions  No aggressive behaviors noted  Patient's ambulates independently with a steady gait without difficulty  No new issues noted at this time

## 2018-10-05 NOTE — PROGRESS NOTES
Pt attended [facility] education group  Pt lacks insight into MH needs and diagnosis  Pt dismissed reason for being here stating "I am not mentally ill"  Pt minimizes symptoms and paranoid of things her room  Pt mentioned she thought bees were in her room  Pt disheveled appearance which she attributed to "female reasons"  Pt mentioned she wanted to go back to bed  Pt needs further education and understanding on medication needs  Pt was able to engage in respectful conversation and listen to feedback  Group reviewed symptoms and signs of depression and positive thinking  Commended pt for effort and attending group

## 2018-10-05 NOTE — PROGRESS NOTES
Patient sleeping in bed  Easily to arouse  Patient reports she slept well  Compliant with 6:00 medication  No further complaints   Will continue to monitor per protocol

## 2018-10-05 NOTE — NURSING NOTE
Patient continues to be compliant with medications and meals  Appetite is good with patient eating 100% of lunch  No complaints of pain or discomfort  Patient denies suicidal ideation and no attempts at self harm noted  No new issues noted at this time

## 2018-10-05 NOTE — PLAN OF CARE
Alteration in Thoughts and Perception     Treatment Goal: Gain control of psychotic behaviors/thinking, reduce/eliminate presenting symptoms and demonstrate improved reality functioning upon discharge Progressing     Verbalize thoughts and feelings Progressing     Refrain from acting on delusional thinking/internal stimuli Progressing     Agree to be compliant with medication regime, as prescribed and report medication side effects Progressing     Attend and participate in unit activities, including therapeutic, recreational, and educational groups Progressing     Recognize dysfunctional thoughts, communicate reality-based thoughts at the time of discharge Progressing     Complete daily ADLs, including personal hygiene independently, as able Progressing        Anxiety     Anxiety is at manageable level Progressing        Depression     Treatment Goal: Demonstrate behavioral control of depressive symptoms, verbalize feelings of improved mood/affect, and adopt new coping skills prior to discharge Progressing     Verbalize thoughts and feelings Progressing     Refrain from harming self Progressing     Refrain from isolation Progressing     Refrain from self-neglect Progressing     Attend and participate in unit activities, including therapeutic, recreational, and educational groups Progressing     Complete daily ADLs, including personal hygiene independently, as able Progressing        Ineffective Coping     Cooperates with admission process Progressing     Identifies ineffective coping skills Progressing     Identifies healthy coping skills Progressing     Demonstrates healthy coping skills Progressing     Participates in unit activities Progressing     Patient/Family participate in treatment and DC plans Progressing     Patient/Family verbalizes awareness of resources Progressing     Understands least restrictive measures Progressing     Free from restraint events Progressing        Risk for Self Injury/Neglect     Treatment Goal: Remain safe during length of stay, learn and adopt new coping skills, and be free of self-injurious ideation, impulses and acts at the time of discharge Progressing     Verbalize thoughts and feelings Progressing     Refrain from harming self Progressing     Attend and participate in unit activities, including therapeutic, recreational, and educational groups Progressing     Recognize maladaptive responses and adopt new coping mechanisms Progressing     Complete daily ADLs, including personal hygiene independently, as able Progressing        Risk for Violence/Aggression Toward Others     Treatment Goal: Refrain from acts of violence/aggression during length of stay, and demonstrate improved impulse control at the time of discharge Progressing     Verbalize thoughts and feelings Progressing     Refrain from harming others Progressing     Refrain from destructive acts on the environment or property Progressing     Control angry outbursts Progressing     Attend and participate in unit activities, including therapeutic, recreational, and educational groups Progressing     Identify appropriate positive anger management techniques Progressing 16-Aug-2017

## 2018-10-05 NOTE — PROGRESS NOTES
Psychiatry Progress Note    Subjective: Interval History     Patient continues to be depressed  She reports that she has tolerated the Depakote well without side effect  She continued to report her mood is up and down  In the past 24 hours she has slowly become less restless and less psychomotor agitated  She has been noted to be pacing the halls less frequently  She slept well last night  Patient has been requiring less frequent redirection    She does admit that she is a smoker and misses not being able to smoke while here in the hospital   Patient has a Depakote level pending for tomorrow    Current medications:    Current Facility-Administered Medications:     aspirin tablet 325 mg, 325 mg, Oral, Daily, Boo Naylor MD, 325 mg at 10/05/18 0808    atorvastatin (LIPITOR) tablet 40 mg, 40 mg, Oral, QPM, Joseluis Faustin PA-C, 40 mg at 10/04/18 1734    bisacodyl (DULCOLAX) rectal suppository 10 mg, 10 mg, Rectal, Daily PRN, Kelley Briceño PA-C, 10 mg at 10/02/18 7850    diphenhydrAMINE (BENADRYL) injection 50 mg, 50 mg, Intramuscular, Q4H PRN, [place] Spoon, PA-C    divalproex sodium (DEPAKOTE ER) 24 hr tablet 500 mg, 500 mg, Oral, Daily, Kelley Briceño, PA-C, 500 mg at 10/05/18 0808    docusate sodium (COLACE) capsule 100 mg, 100 mg, Oral, BID PRN, Floydene First, PA-C, 100 mg at 10/01/18 0844    doxepin (SINEquan) capsule 25 mg, 25 mg, Oral, HS, [place] Spoon, PA-C, 25 mg at 10/04/18 2153    enoxaparin (LOVENOX) subcutaneous injection 40 mg, 40 mg, Subcutaneous, Q24H [address], Joseluis Arian Gyarmawes, PA-C, 40 mg at 10/05/18 0809    gabapentin (NEURONTIN) capsule 300 mg, 300 mg, Oral, TID, [place] Spoon, PA-C, 300 mg at 10/05/18 0809    lactulose 20 g/30 mL oral solution 20 g, 20 g, Oral, BID PRN, Floydene First, PA-C    levothyroxine tablet 137 mcg, 137 mcg, Oral, Early Morning, Joseluis Faustin PA-C, 137 mcg at 10/05/18 2065    LORazepam (ATIVAN) 2 mg/mL injection 0 5 mg, 0 5 mg, Intramuscular, Q4H PRN, Bennye Negra, PA-C    LORazepam (ATIVAN) tablet 0 5 mg, 0 5 mg, Oral, Q4H PRN, Bennye Negra, PA-C, 0 5 mg at 10/03/18 1823    melatonin tablet 6 mg, 6 mg, Oral, HS PRN, Bennye Negra, PA-C    nicotine (NICODERM CQ) 14 mg/24hr TD 24 hr patch 1 patch, 1 patch, Transdermal, Daily, Dolores Almanzar, PA-C, 1 patch at 10/05/18 0815    nicotine polacrilex (NICORETTE) gum 2 mg, 2 mg, Oral, Q4H PRN, Bennye Negra, PA-C, 2 mg at 10/04/18 1733    OLANZapine (ZyPREXA) IM injection 5 mg, 5 mg, Intramuscular, Q4H PRN, Bennye Negra, PA-C    OLANZapine (ZyPREXA) tablet 5 mg, 5 mg, Oral, Q4H PRN, Bennye Negra, PA-C    PARoxetine (PAXIL) tablet 20 mg, 20 mg, Oral, Daily, Tyron Alvarado MD, 20 mg at 10/05/18 0809    polyethylene glycol (MIRALAX) packet 17 g, 17 g, Oral, Daily PRN, AMBROCIO JonesNP, 17 g at 10/01/18 0845    QUEtiapine (SEROquel) tablet 50 mg, 50 mg, Oral, BID, Paullette Nailer, PA-C, 50 mg at 10/05/18 0808    senna-docusate sodium (SENOKOT S) 8 6-50 mg per tablet 1 tablet, 1 tablet, Oral, HS, Stanislav Rutledge CRNP, 1 tablet at 10/04/18 2153    senna-docusate sodium (SENOKOT S) 8 6-50 mg per tablet 1 tablet, 1 tablet, Oral, BID PRN, Paullette Nailer, PA-C, 1 tablet at 10/01/18 0845    traZODone (DESYREL) tablet 50 mg, 50 mg, Oral, HS PRN, Bennye Negra, PA-C, 50 mg at 09/30/18 2136      Objective:     Vital Signs:  Vitals:    10/03/18 1607 10/04/18 0730 10/04/18 1552 10/05/18 0701   BP: 123/53 118/71 106/55 134/79   BP Location: Right arm Left arm Left arm Right arm   Pulse: 84 85 78 80   Resp: 20 18 19 18   Temp: (!) 96 8 °F (36 °C) 97 9 °F (36 6 °C) (!) 97 1 °F (36 2 °C) (!) 97 °F (36 1 °C)   TempSrc: Temporal Temporal Temporal Temporal   SpO2: 100% 98% 95% 98%   Weight:       Height:             Appearance:  age appropriate and disheveled   Behavior:  evasive and guarded   Speech:  normal volume   Mood:  depressed and irritable   Affect: constricted and inappropriate   Thought Process:  circumstantial and disorganized   Thought Content:  normal   Perceptual Disturbances: None   Risk Potential: none   Sensorium:  person, place, situation and time   Cognition:  grossly intact   Consciousness:  alert and awake    Attention: attention span appeared shorter than expected for age   Intellect: within normal limits   Insight:  poor   Judgment: poor      Motor Activity: no abnormal movements           Recent Labs:  Results Reviewed     None          I/O Past 24 hours:  I/O last 3 completed shifts: In: 840 [P O :840]  Out: 500 [Urine:500]  No intake/output data recorded  Assessment / Plan:     Major depression with psychotic features ([facility] )    Recommended Treatment:      Medication changes:  1) Depakote level pending tomorrow    Non-pharmacological treatments  1) Continue with group therapy, milieu therapy and occupational therapy  Safety  1) Safety/communication plan established targeting dynamic risk factors above  2) Risks, benefits, and possible side effects of medications explained to patient and patient verbalizes understanding  Counseling / Coordination of Care    Total floor / unit time spent today 20 minutes  Greater than 50% of total time was spent with the patient and / or family counseling and / or coordination of care  A description of the counseling / coordination of care  Patient's Rights, confidentiality and exceptions to confidentiality, use of automated medical record, [facility] staff access to medical record, and consent to treatment reviewed      Lisa Dela Cruz PA-C

## 2018-10-05 NOTE — SOCIAL WORK
Kodi Hopson and her supervisor will be coming down on Wednesday, 10/10/18 at  ___  SW will continue to follow up and provide services as needed

## 2018-10-06 LAB — VALPROATE SERPL-MCNC: 29.5 UG/ML (ref 50–120)

## 2018-10-06 PROCEDURE — 80164 ASSAY DIPROPYLACETIC ACD TOT: CPT | Performed by: PHYSICIAN ASSISTANT

## 2018-10-06 RX ORDER — DIVALPROEX SODIUM 500 MG/1
500 TABLET, DELAYED RELEASE ORAL 2 TIMES DAILY
Status: DISCONTINUED | OUTPATIENT
Start: 2018-10-07 | End: 2018-10-12 | Stop reason: HOSPADM

## 2018-10-06 RX ADMIN — DIVALPROEX SODIUM 500 MG: 500 TABLET, FILM COATED, EXTENDED RELEASE ORAL at 08:27

## 2018-10-06 RX ADMIN — ENOXAPARIN SODIUM 40 MG: 40 INJECTION SUBCUTANEOUS at 08:27

## 2018-10-06 RX ADMIN — QUETIAPINE 50 MG: 50 TABLET ORAL at 17:05

## 2018-10-06 RX ADMIN — GABAPENTIN 300 MG: 300 CAPSULE ORAL at 08:27

## 2018-10-06 RX ADMIN — GABAPENTIN 300 MG: 300 CAPSULE ORAL at 21:24

## 2018-10-06 RX ADMIN — ASPIRIN 325 MG ORAL TABLET 325 MG: 325 PILL ORAL at 08:27

## 2018-10-06 RX ADMIN — QUETIAPINE 50 MG: 50 TABLET ORAL at 08:27

## 2018-10-06 RX ADMIN — NICOTINE 1 PATCH: 14 PATCH, EXTENDED RELEASE TRANSDERMAL at 08:31

## 2018-10-06 RX ADMIN — PAROXETINE 20 MG: 20 TABLET, FILM COATED ORAL at 08:27

## 2018-10-06 RX ADMIN — GABAPENTIN 300 MG: 300 CAPSULE ORAL at 15:41

## 2018-10-06 RX ADMIN — ATORVASTATIN CALCIUM 40 MG: 40 TABLET, FILM COATED ORAL at 17:05

## 2018-10-06 RX ADMIN — SENNOSIDES AND DOCUSATE SODIUM 1 TABLET: 8.6; 5 TABLET ORAL at 21:24

## 2018-10-06 RX ADMIN — LEVOTHYROXINE SODIUM 137 MCG: 112 TABLET ORAL at 06:20

## 2018-10-06 RX ADMIN — DOXEPIN HYDROCHLORIDE 25 MG: 25 CAPSULE ORAL at 21:24

## 2018-10-06 NOTE — NURSING NOTE
Patient is awake and alert this morning  Appetite was good with patient eating 100% of breakfast   Patient denies ever being suicidal and has no intentions or plan  Patient remains on assault, suicide, and fall precautions at this time  Patient ambulates independently without difficulty throughout the unit  No complaints of pain or discomfort  No new issues noted at this time

## 2018-10-06 NOTE — PROGRESS NOTES
Patient appears to be sleeping in her bed  She is easily aroussable  Her behaviors are appropriate  She shows no signs of distress  Patient denies any needs at this time  Will continue to monitor

## 2018-10-06 NOTE — NURSING NOTE
Patient remains compliant with medications and meals  Appetite is good with patient eating 100% of most meals  No complaints of pain or discomfort  Patient's Depakote Level at 29 5 and NADEEN Alicea notified of same  No new orders noted and patient initially started on Depakote on 10/3/18  Patient denies suicidal ideation, intent or plan  Patient showered this afternoon independently without difficulty  Patient is cooperative with staff and interacts with peers without difficulty  Patient questioned where she could get glasses and starting discussing her discharge plans and how she is homeless  Patient allowed to express her feelings and patient advised to speak with  to see what is available for someone in her situation  Patient agreed with this plan  No other issues noted at this time

## 2018-10-06 NOTE — PROGRESS NOTES
Patient remains sp1  She is in her bed  She appears to have slept all night  Patient shows no signs of distress or discomfort  Will continue to moniiotr on sp1 for safety and support

## 2018-10-06 NOTE — PROGRESS NOTES
Patient appears to be sleeping patient shows no signs of harmful behaviors or distress  Patient remains sp1 for safety  Will continue to monitor

## 2018-10-07 RX ADMIN — DOXEPIN HYDROCHLORIDE 25 MG: 25 CAPSULE ORAL at 21:59

## 2018-10-07 RX ADMIN — PAROXETINE 20 MG: 20 TABLET, FILM COATED ORAL at 08:22

## 2018-10-07 RX ADMIN — SENNOSIDES AND DOCUSATE SODIUM 1 TABLET: 8.6; 5 TABLET ORAL at 21:59

## 2018-10-07 RX ADMIN — ATORVASTATIN CALCIUM 40 MG: 40 TABLET, FILM COATED ORAL at 17:12

## 2018-10-07 RX ADMIN — ENOXAPARIN SODIUM 40 MG: 40 INJECTION SUBCUTANEOUS at 08:24

## 2018-10-07 RX ADMIN — QUETIAPINE 50 MG: 50 TABLET ORAL at 17:11

## 2018-10-07 RX ADMIN — DIVALPROEX SODIUM 500 MG: 500 TABLET, DELAYED RELEASE ORAL at 08:22

## 2018-10-07 RX ADMIN — NICOTINE 1 PATCH: 14 PATCH, EXTENDED RELEASE TRANSDERMAL at 08:25

## 2018-10-07 RX ADMIN — GABAPENTIN 300 MG: 300 CAPSULE ORAL at 17:11

## 2018-10-07 RX ADMIN — NICOTINE POLACRILEX 2 MG: 2 GUM, CHEWING ORAL at 17:14

## 2018-10-07 RX ADMIN — GABAPENTIN 300 MG: 300 CAPSULE ORAL at 21:59

## 2018-10-07 RX ADMIN — LEVOTHYROXINE SODIUM 137 MCG: 112 TABLET ORAL at 05:47

## 2018-10-07 RX ADMIN — DIVALPROEX SODIUM 500 MG: 500 TABLET, DELAYED RELEASE ORAL at 17:11

## 2018-10-07 RX ADMIN — ASPIRIN 325 MG ORAL TABLET 325 MG: 325 PILL ORAL at 08:22

## 2018-10-07 RX ADMIN — GABAPENTIN 300 MG: 300 CAPSULE ORAL at 08:24

## 2018-10-07 RX ADMIN — QUETIAPINE 50 MG: 50 TABLET ORAL at 08:22

## 2018-10-07 NOTE — PROGRESS NOTES
Patient is in her bed sleeping  She is easily arousable  Her behaviors are normal  She shows no signs of distress or harmful behaviors  Will continue to monitor on sp1 for safety and support  Will continue to monitor

## 2018-10-07 NOTE — PROGRESS NOTES
Psychiatry Progress Note    Subjective: Interval History     WILY has been doing well  Depression and anxiety are improved  Sleeping and eating well  Still worried about her discharge plan  Denies homicidal or suicidal ideations       Current medications:    Current Facility-Administered Medications:     aspirin tablet 325 mg, 325 mg, Oral, Daily, Oleksandr White MD, 325 mg at 10/07/18 1534    atorvastatin (LIPITOR) tablet 40 mg, 40 mg, Oral, QPM, [facility] LUIZA Faustin, 40 mg at 10/06/18 1705    bisacodyl (DULCOLAX) rectal suppository 10 mg, 10 mg, Rectal, Daily PRN, Berta Weiss PA-C, 10 mg at 10/02/18 3991    diphenhydrAMINE (BENADRYL) injection 50 mg, 50 mg, Intramuscular, Q4H PRN, Judy Dempsey PA-C    divalproex sodium (DEPAKOTE) EC tablet 500 mg, 500 mg, Oral, BID, NADEEN Golden, 500 mg at 10/07/18 9290    docusate sodium (COLACE) capsule 100 mg, 100 mg, Oral, BID PRN, Nyla Castaneda PA-C, 100 mg at 10/01/18 0844    doxepin (SINEquan) capsule 25 mg, 25 mg, Oral, HS, Edyoli Dempsey, LUIZA, 25 mg at 10/06/18 2124    enoxaparin (LOVENOX) subcutaneous injection 40 mg, 40 mg, Subcutaneous, Q24H [facility], [facility]ones LUIZA Faustin, 40 mg at 10/07/18 3454    gabapentin (NEURONTIN) capsule 300 mg, 300 mg, Oral, TID, Edwinhamilton Dempsey, PA-TRISTIAN, 300 mg at 10/07/18 9962    lactulose 20 g/30 mL oral solution 20 g, 20 g, Oral, BID PRN, Nyla Castaneda PA-C    levothyroxine tablet 137 mcg, 137 mcg, Oral, Early Morning, Kendal Perez PA-C, 137 mcg at 10/07/18 0547    LORazepam (ATIVAN) 2 mg/mL injection 0 5 mg, 0 5 mg, Intramuscular, Q4H PRN, Judy Dempsey, LUIZA    LORazepam (ATIVAN) tablet 0 5 mg, 0 5 mg, Oral, Q4H PRN, Edwinna Course, PA-C, 0 5 mg at 10/03/18 1823    melatonin tablet 6 mg, 6 mg, Oral, HS PRN, Edwinna Course, PA-C    nicotine (NICODERM CQ) 14 mg/24hr TD 24 hr patch 1 patch, 1 patch, Transdermal, Daily, Kendal Perez PA-C, 1 patch at 10/07/18 9934   nicotine polacrilex (NICORETTE) gum 2 mg, 2 mg, Oral, Q4H PRN, DWIGHT García-TRISTIAN, 2 mg at 10/05/18 0931    OLANZapine (ZyPREXA) IM injection 5 mg, 5 mg, Intramuscular, Q4H PRN, Moshe Crews PA-C    OLANZapine (ZyPREXA) tablet 5 mg, 5 mg, Oral, Q4H PRN, Moshe Crews PA-C    PARoxetine (PAXIL) tablet 20 mg, 20 mg, Oral, Daily, Jo-Ann Adne MD, 20 mg at 10/07/18 6047    polyethylene glycol (MIRALAX) packet 17 g, 17 g, Oral, Daily PRN, NADEEN Jackson, 17 g at 10/01/18 0845    QUEtiapine (SEROquel) tablet 50 mg, 50 mg, Oral, BID, Luis Fernando Espinosa PA-C, 50 mg at 10/07/18 2749    senna-docusate sodium (SENOKOT S) 8 6-50 mg per tablet 1 tablet, 1 tablet, Oral, HS, NADEEN Jackson, 1 tablet at 10/06/18 2124    senna-docusate sodium (SENOKOT S) 8 6-50 mg per tablet 1 tablet, 1 tablet, Oral, BID PRN, Luis Fernando Espinosa PA-C, 1 tablet at 10/01/18 0845    traZODone (DESYREL) tablet 50 mg, 50 mg, Oral, HS PRN, DWIGHT García-C, 50 mg at 09/30/18 2136      Objective:     Vital Signs:  Vitals:    10/06/18 0714 10/06/18 1605 10/07/18 0737 10/07/18 1539   BP: 107/73 109/65 113/68 (!) 75/50   BP Location: Left arm Left arm Left arm Right arm   Pulse: 78 86 78 79   Resp: 18 18 19 18   Temp: 98 1 °F (36 7 °C) 97 6 °F (36 4 °C) 98 7 °F (37 1 °C) 98 5 °F (36 9 °C)   TempSrc: Temporal Temporal Temporal Temporal   SpO2: 98% 97% 96% 96%   Weight:       Height:             Appearance:  age appropriate and disheveled   Behavior:  evasive and guarded   Speech:  normal volume   Mood:  depressed and irritable   Affect:  constricted and inappropriate   Thought Process:  circumstantial and disorganized   Thought Content:  normal   Perceptual Disturbances: None   Risk Potential: none   Sensorium:  person, place, situation and time   Cognition:  grossly intact   Consciousness:  alert and awake    Attention: attention span appeared shorter than expected for age   Intellect: within normal limits Insight:  poor   Judgment: poor      Motor Activity: no abnormal movements           Recent Labs:  Results Reviewed     None          I/O Past 24 hours:  I/O last 3 completed shifts: In: 1320 [P O :1320]  Out: -   I/O this shift: In: 80 [P O :580]  Out: 1 [Stool:1]        Assessment / Plan:     Major depression with psychotic features ([facility] )    Recommended Treatment:      Medication changes:  1) Depakote level pending tomorrow    Non-pharmacological treatments  1) Continue with group therapy, milieu therapy and occupational therapy  Safety  1) Safety/communication plan established targeting dynamic risk factors above  2) Risks, benefits, and possible side effects of medications explained to patient and patient verbalizes understanding  Counseling / Coordination of Care    Total floor / unit time spent today 20 minutes  Greater than 50% of total time was spent with the patient and / or family counseling and / or coordination of care  A description of the counseling / coordination of care  Patient's Rights, confidentiality and exceptions to confidentiality, use of automated medical record, [facility]lyn staff access to medical record, and consent to treatment reviewed      NADEEN Miller

## 2018-10-07 NOTE — PROGRESS NOTES
Patient appears to have slept all night The patient shows no signs of distress  Will monitor for safety and support

## 2018-10-07 NOTE — PROGRESS NOTES
Pt visible on unit  Social with select peers  Denies pain  Denies Sis at this time, will continue to monitor on SP1 precautions for safety and support

## 2018-10-07 NOTE — PROGRESS NOTES
Patient is in her bed  Appears to be sleeping  She is easily aroussable  She is oriented and behavior appropriate  She remains sp1 and denies any suicide ideations  Will continue  To monitor

## 2018-10-07 NOTE — PROGRESS NOTES
Psychiatry Progress Note    Subjective: Interval History     Munir Huertas reports improvement in her overall psychiatric symptoms  She states that her depression has "lifted" and her anxiety is under better control  She continues to struggle with her homeless status, and is worried about what the future holds for her  She has been visible in the milieu, pleasant and cooperative  She denies any homicidal or suicidal ideations  VPA 29 5  I will increase her Depakote and will reorder another VPA level      Current medications:    Current Facility-Administered Medications:     aspirin tablet 325 mg, 325 mg, Oral, Daily, Yanna Lopez MD, 325 mg at 10/06/18 0827    atorvastatin (LIPITOR) tablet 40 mg, 40 mg, Oral, QPM, Liset Faustin PA-C, 40 mg at 10/06/18 1705    bisacodyl (DULCOLAX) rectal suppository 10 mg, 10 mg, Rectal, Daily PRN, Alexandria Hannah PA-C, 10 mg at 10/02/18 6059    diphenhydrAMINE (BENADRYL) injection 50 mg, 50 mg, Intramuscular, Q4H PRN, Eryn London PA-C    [START ON 10/7/2018] divalproex sodium (DEPAKOTE ER) 24 hr tablet 500 mg, 500 mg, Oral, BID, NADEEN Li    docusate sodium (COLACE) capsule 100 mg, 100 mg, Oral, BID PRN, Lisa Dela Cruz PA-C, 100 mg at 10/01/18 0844    doxepin (SINEquan) capsule 25 mg, 25 mg, Oral, HS, Eryn London PA-C, 25 mg at 10/06/18 2124    enoxaparin (LOVENOX) subcutaneous injection 40 mg, 40 mg, Subcutaneous, Q24H [address], Liset Faustin PA-C, 40 mg at 10/06/18 0827    gabapentin (NEURONTIN) capsule 300 mg, 300 mg, Oral, TID, Eryn London PA-C, 300 mg at 10/06/18 2124    lactulose 20 g/30 mL oral solution 20 g, 20 g, Oral, BID PRN, Lisa Dela Cruz PA-C    levothyroxine tablet 137 mcg, 137 mcg, Oral, Early Morning, Audra Jiménez PA-C, 137 mcg at 10/06/18 0620    LORazepam (ATIVAN) 2 mg/mL injection 0 5 mg, 0 5 mg, Intramuscular, Q4H PRN, Eryn London PA-C    LORazepam (ATIVAN) tablet 0 5 mg, 0 5 mg, Oral, Q4H PRN, Gelюлия Sparrow, PA-C, 0 5 mg at 10/03/18 1823    melatonin tablet 6 mg, 6 mg, Oral, HS PRN, Gelюлия Sparrow, PA-C    nicotine (NICODERM CQ) 14 mg/24hr TD 24 hr patch 1 patch, 1 patch, Transdermal, Daily, DWIGHT Adams-C, 1 patch at 10/06/18 0831    nicotine polacrilex (NICORETTE) gum 2 mg, 2 mg, Oral, Q4H PRN, Gelюлия Sparrow, PA-C, 2 mg at 10/05/18 0931    OLANZapine (ZyPREXA) IM injection 5 mg, 5 mg, Intramuscular, Q4H PRN, Gelюлия Sparrow PA-C    OLANZapine (ZyPREXA) tablet 5 mg, 5 mg, Oral, Q4H PRN, Gelюлия Sparrow, PA-C    PARoxetine (PAXIL) tablet 20 mg, 20 mg, Oral, Daily, Mirian Nunez MD, 20 mg at 10/06/18 0827    polyethylene glycol (MIRALAX) packet 17 g, 17 g, Oral, Daily PRN, Remona Satishr CRNP, 17 g at 10/01/18 0845    QUEtiapine (SEROquel) tablet 50 mg, 50 mg, Oral, BID, DWIGHT Covington-C, 50 mg at 10/06/18 1705    senna-docusate sodium (SENOKOT S) 8 6-50 mg per tablet 1 tablet, 1 tablet, Oral, HS, Remona Borer, CRNP, 1 tablet at 10/06/18 2124    senna-docusate sodium (SENOKOT S) 8 6-50 mg per tablet 1 tablet, 1 tablet, Oral, BID PRN, DWIGHT Covington-C, 1 tablet at 10/01/18 0845    traZODone (DESYREL) tablet 50 mg, 50 mg, Oral, HS PRN, Gelюлия Sparrow, PA-C, 50 mg at 09/30/18 2136      Objective:     Vital Signs:  Vitals:    10/05/18 0701 10/05/18 1530 10/06/18 0714 10/06/18 1605   BP: 134/79 116/65 107/73 109/65   BP Location: Right arm Right arm Left arm Left arm   Pulse: 80 75 78 86   Resp: 18 17 18 18   Temp: (!) 97 °F (36 1 °C) 97 7 °F (36 5 °C) 98 1 °F (36 7 °C) 97 6 °F (36 4 °C)   TempSrc: Temporal Temporal Temporal Temporal   SpO2: 98% 98% 98% 97%   Weight:       Height:             Appearance:  age appropriate and disheveled   Behavior:  evasive and guarded   Speech:  normal volume   Mood:  depressed and irritable   Affect:  constricted and inappropriate   Thought Process:  circumstantial and disorganized   Thought Content:  normal Perceptual Disturbances: None   Risk Potential: none   Sensorium:  person, place, situation and time   Cognition:  grossly intact   Consciousness:  alert and awake    Attention: attention span appeared shorter than expected for age   Intellect: within normal limits   Insight:  poor   Judgment: poor      Motor Activity: no abnormal movements           Recent Labs:  Results Reviewed     None          I/O Past 24 hours:  I/O last 3 completed shifts: In: 2280 [P O :2280]  Out: -   No intake/output data recorded  Assessment / Plan:     [facility]    Recommended Treatment:      Medication changes:  1) Depakote level pending tomorrow    Non-pharmacological treatments  1) Continue with group therapy, milieu therapy and occupational therapy  Safety  1) Safety/communication plan established targeting dynamic risk factors above  2) Risks, benefits, and possible side effects of medications explained to patient and patient verbalizes understanding  Counseling / Coordination of Care    Total floor / unit time spent today 20 minutes  Greater than 50% of total time was spent with the patient and / or family counseling and / or coordination of care  A description of the counseling / coordination of care  Patient's Rights, confidentiality and exceptions to confidentiality, use of automated medical record, [facility] staff access to medical record, and consent to treatment reviewed      NADEEN Riley

## 2018-10-08 RX ADMIN — QUETIAPINE 50 MG: 50 TABLET ORAL at 17:22

## 2018-10-08 RX ADMIN — GABAPENTIN 300 MG: 300 CAPSULE ORAL at 17:22

## 2018-10-08 RX ADMIN — NICOTINE POLACRILEX 2 MG: 2 GUM, CHEWING ORAL at 15:30

## 2018-10-08 RX ADMIN — QUETIAPINE 50 MG: 50 TABLET ORAL at 08:43

## 2018-10-08 RX ADMIN — ATORVASTATIN CALCIUM 40 MG: 40 TABLET, FILM COATED ORAL at 17:22

## 2018-10-08 RX ADMIN — SENNOSIDES AND DOCUSATE SODIUM 1 TABLET: 8.6; 5 TABLET ORAL at 21:19

## 2018-10-08 RX ADMIN — GABAPENTIN 300 MG: 300 CAPSULE ORAL at 21:19

## 2018-10-08 RX ADMIN — GABAPENTIN 300 MG: 300 CAPSULE ORAL at 08:42

## 2018-10-08 RX ADMIN — DOXEPIN HYDROCHLORIDE 25 MG: 25 CAPSULE ORAL at 21:19

## 2018-10-08 RX ADMIN — DIVALPROEX SODIUM 500 MG: 500 TABLET, DELAYED RELEASE ORAL at 08:42

## 2018-10-08 RX ADMIN — ASPIRIN 325 MG ORAL TABLET 325 MG: 325 PILL ORAL at 08:42

## 2018-10-08 RX ADMIN — DIVALPROEX SODIUM 500 MG: 500 TABLET, DELAYED RELEASE ORAL at 17:22

## 2018-10-08 RX ADMIN — NICOTINE 1 PATCH: 14 PATCH, EXTENDED RELEASE TRANSDERMAL at 08:47

## 2018-10-08 RX ADMIN — LEVOTHYROXINE SODIUM 137 MCG: 112 TABLET ORAL at 05:22

## 2018-10-08 RX ADMIN — PAROXETINE 20 MG: 20 TABLET, FILM COATED ORAL at 08:42

## 2018-10-08 NOTE — PROGRESS NOTES
Alert,awake and visible only for meals,then goes back to her room  Pleasant and cooperative with staff,denies any issues at this time  SP1 precautions maintained,pt denies any thoughts of self harm at this time  Med compliant  No distress noted  Pt refused Lovenox,education was provided  Will continue to monitor closely

## 2018-10-08 NOTE — SOCIAL WORK
Pt YASSINE fontaine from [facility] will be out Wed to visit pt  SW will discuss DC plan as at this time it is unlikely pt will be appropriate for SNF per [facility]  SW is waiting for final  LCD from [facility]  DC plan will be discussed Wed with BCM  Home to shelter or group home could be potential options  SW will continue to follow up

## 2018-10-08 NOTE — PROGRESS NOTES
Pt maintained on SP1 precautions ,pt denies any thoughts of self harm at this time  Will continue to monitor closely

## 2018-10-08 NOTE — PROGRESS NOTES
Psychiatry Progress Note    Subjective: Interval History     Patient continues to report improvement in her depression and anxiety  Her Depakote level was found to be subtherapeutic at 29 5  Because of this her Depakote was increased to 500 mg twice a day with a repeat Depakote level planned on October 10, 2018  Still remains bizarre at times    She has been sleeping well at night patient attributes her mood lability to the fact that she is quitting smoking while here in the hospital   Current medications:    Current Facility-Administered Medications:     aspirin tablet 325 mg, 325 mg, Oral, Daily, Ivy Santiago MD, 325 mg at 10/07/18 2579    atorvastatin (LIPITOR) tablet 40 mg, 40 mg, Oral, QPM, Isaiah Faustin PA-C, 40 mg at 10/07/18 1712    bisacodyl (DULCOLAX) rectal suppository 10 mg, 10 mg, Rectal, Daily PRN, Giselle Gonzalez PA-C, 10 mg at 10/02/18 1306    diphenhydrAMINE (BENADRYL) injection 50 mg, 50 mg, Intramuscular, Q4H PRN, Ventura Keenan PA-C    divalproex sodium (DEPAKOTE) EC tablet 500 mg, 500 mg, Oral, BID, NADEEN Jones, 500 mg at 10/07/18 1711    docusate sodium (COLACE) capsule 100 mg, 100 mg, Oral, BID PRN, Pasquale Blair PA-C, 100 mg at 10/01/18 0844    doxepin (SINEquan) capsule 25 mg, 25 mg, Oral, HS, Ventura Keenan PA-C, 25 mg at 10/07/18 2159    enoxaparin (LOVENOX) subcutaneous injection 40 mg, 40 mg, Subcutaneous, Q24H [facility], Isaiah Faustin PA-C, 40 mg at 10/07/18 0469    gabapentin (NEURONTIN) capsule 300 mg, 300 mg, Oral, TID, Ventura Keenan PA-C, 300 mg at 10/07/18 2159    lactulose 20 g/30 mL oral solution 20 g, 20 g, Oral, BID PRN, Pasquale Blair PA-C    levothyroxine tablet 137 mcg, 137 mcg, Oral, Early Morning, Wendy Walter PA-C, 137 mcg at 10/08/18 0522    LORazepam (ATIVAN) 2 mg/mL injection 0 5 mg, 0 5 mg, Intramuscular, Q4H PRN, Ventura Keenan PA-C    LORazepam (ATIVAN) tablet 0 5 mg, 0 5 mg, Oral, Q4H PRN, Cameron Cochran LUIZA Lamb, 0 5 mg at 10/03/18 1823    melatonin tablet 6 mg, 6 mg, Oral, HS PRN, [place] Doyle PA-C    nicotine (NICODERM CQ) 14 mg/24hr TD 24 hr patch 1 patch, 1 patch, Transdermal, Daily, Ml Hancock PA-C, 1 patch at 10/07/18 0825    nicotine polacrilex (NICORETTE) gum 2 mg, 2 mg, Oral, Q4H PRN, N[place] Doyle PA-C, 2 mg at 10/07/18 1714    OLANZapine (ZyPREXA) IM injection 5 mg, 5 mg, Intramuscular, Q4H PRN, Nba Doyle PA-C    OLANZapine (ZyPREXA) tablet 5 mg, 5 mg, Oral, Q4H PRN, Nba Doyle PA-C    PARoxetine (PAXIL) tablet 20 mg, 20 mg, Oral, Daily, Marybeth Tomas MD, 20 mg at 10/07/18 0068    polyethylene glycol (MIRALAX) packet 17 g, 17 g, Oral, Daily PRN, NADEEN Dorantes, 17 g at 10/01/18 0845    QUEtiapine (SEROquel) tablet 50 mg, 50 mg, Oral, BID, DWIGHT Tee-TRISTIAN, 50 mg at 10/07/18 1711    senna-docusate sodium (SENOKOT S) 8 6-50 mg per tablet 1 tablet, 1 tablet, Oral, HS, NADEEN Dorantes, 1 tablet at 10/07/18 2159    senna-docusate sodium (SENOKOT S) 8 6-50 mg per tablet 1 tablet, 1 tablet, Oral, BID PRN, Matt Monson PA-C, 1 tablet at 10/01/18 0845    traZODone (DESYREL) tablet 50 mg, 50 mg, Oral, HS PRN, [place] Doyle PA-C, 50 mg at 09/30/18 2136      Objective:     Vital Signs:  Vitals:    10/06/18 0714 10/06/18 1605 10/07/18 0737 10/07/18 1539   BP: 107/73 109/65 113/68 (!) 75/50   BP Location: Left arm Left arm Left arm Right arm   Pulse: 78 86 78 79   Resp: 18 18 19 18   Temp: 98 1 °F (36 7 °C) 97 6 °F (36 4 °C) 98 7 °F (37 1 °C) 98 5 °F (36 9 °C)   TempSrc: Temporal Temporal Temporal Temporal   SpO2: 98% 97% 96% 96%   Weight:       Height:             Appearance:  age appropriate and disheveled   Behavior:  evasive and guarded   Speech:  normal volume   Mood:  depressed and irritable   Affect:  constricted and inappropriate   Thought Process:  circumstantial and disorganized   Thought Content:  normal   Perceptual Disturbances: None   Risk Potential: none   Sensorium:  person, place, situation and time   Cognition:  grossly intact   Consciousness:  alert and awake    Attention: attention span appeared shorter than expected for age   Intellect: within normal limits   Insight:  poor   Judgment: poor      Motor Activity: no abnormal movements           Recent Labs:  Results Reviewed     None          I/O Past 24 hours:  I/O last 3 completed shifts: In: 940 [P O :940]  Out: 1 [Stool:1]  No intake/output data recorded  Assessment / Plan:     [facility] depression [facility]    Recommended Treatment:      Medication changes:  1) Depakote level pending October 10th    Non-pharmacological treatments  1) Continue with group therapy, milieu therapy and occupational therapy  Safety  1) Safety/communication plan established targeting dynamic risk factors above  2) Risks, benefits, and possible side effects of medications explained to patient and patient verbalizes understanding  Counseling / Coordination of Care    Total floor / unit time spent today 20 minutes  Greater than 50% of total time was spent with the patient and / or family counseling and / or coordination of care  A description of the counseling / coordination of care  Patient's Rights, confidentiality and exceptions to confidentiality, use of automated medical record, [facility] staff access to medical record, and consent to treatment reviewed      Jacklyn Larios PA-C

## 2018-10-08 NOTE — PROGRESS NOTES
Pt visible on unit  No self-harm behaviors observed  Pleasant and cooperative on approach  Denies Sis at this time, will continue to monitor on SP1 precautions for safety and support

## 2018-10-09 RX ADMIN — NICOTINE POLACRILEX 2 MG: 2 GUM, CHEWING ORAL at 17:04

## 2018-10-09 RX ADMIN — QUETIAPINE 50 MG: 50 TABLET ORAL at 17:00

## 2018-10-09 RX ADMIN — SENNOSIDES AND DOCUSATE SODIUM 1 TABLET: 8.6; 5 TABLET ORAL at 21:36

## 2018-10-09 RX ADMIN — DIVALPROEX SODIUM 500 MG: 500 TABLET, DELAYED RELEASE ORAL at 17:00

## 2018-10-09 RX ADMIN — ASPIRIN 325 MG ORAL TABLET 325 MG: 325 PILL ORAL at 08:08

## 2018-10-09 RX ADMIN — QUETIAPINE 50 MG: 50 TABLET ORAL at 08:08

## 2018-10-09 RX ADMIN — ATORVASTATIN CALCIUM 40 MG: 40 TABLET, FILM COATED ORAL at 17:00

## 2018-10-09 RX ADMIN — LEVOTHYROXINE SODIUM 137 MCG: 112 TABLET ORAL at 06:19

## 2018-10-09 RX ADMIN — PAROXETINE 20 MG: 20 TABLET, FILM COATED ORAL at 08:08

## 2018-10-09 RX ADMIN — GABAPENTIN 300 MG: 300 CAPSULE ORAL at 08:08

## 2018-10-09 RX ADMIN — DOXEPIN HYDROCHLORIDE 25 MG: 25 CAPSULE ORAL at 21:36

## 2018-10-09 RX ADMIN — DIVALPROEX SODIUM 500 MG: 500 TABLET, DELAYED RELEASE ORAL at 08:08

## 2018-10-09 RX ADMIN — NICOTINE 1 PATCH: 14 PATCH, EXTENDED RELEASE TRANSDERMAL at 08:09

## 2018-10-09 RX ADMIN — GABAPENTIN 300 MG: 300 CAPSULE ORAL at 17:00

## 2018-10-09 RX ADMIN — GABAPENTIN 300 MG: 300 CAPSULE ORAL at 21:36

## 2018-10-09 NOTE — SOCIAL WORK
SW spoke to pt  Pt is aware and in agreement with DC Fri  Pt is also agreeable to meeting with her Stefan Beltran on Wed 10/10/18 to discuss DC plans  SW will continue to follow up

## 2018-10-09 NOTE — PROGRESS NOTES
Pt maintained on SP1 precautions, pt denies any thoughts of self harm at this time  No distress noted No behavior issues at this time  Will continue to monitor closely

## 2018-10-09 NOTE — PROGRESS NOTES
Pt visible on unit  Med and meal compliant  No self-harm behaviors observed  Pleasant and cooperative on approach  Denies Sis at this time, will continue to monitor on SP1 precautions for safety and support

## 2018-10-09 NOTE — PROGRESS NOTES
Alert,oriented and visible during meals  Med compliant  Pt denies any issues at this time  Med compliant  Pt maintained on SP1 precautions,pt denies any thoughts of self harm at this time  Will continue to monitor closely

## 2018-10-09 NOTE — PROGRESS NOTES
10/09/18 1000   Activity/Group Checklist   Group Other (Comment)  (Art Therapy Process Group / Open)   Attendance Attended   Attendance Duration (min) 46-60   Interactions Interacted appropriately   Affect/Mood Appropriate;Blunted/flat   Goals Achieved Able to listen to others; Able to engage in interactions; Able to recieve feedback  (Able to engage art materials; resists insight)     Patient able to continue with unfinished artwork; process was focused and appeared involved  Patient remains self-absorbed, but less monopolizing of the group  She did require some gentle redirection to remain on topic  Artwork suggested an "invisible raised edge" outlining the self; can be possibly deceptive at times  Emotionally disconnected; resistant  Resistance to emotion lies in the "edge" of her barrier

## 2018-10-09 NOTE — NURSING NOTE
Patient has been visible in unit, pleasant and cooperative on approach  On suicide precautions, no behaviors or statements this shift  Labs, orders and vital signs have been reviewed  On routine Q 15 minute checks, will continue to monitor

## 2018-10-09 NOTE — PROGRESS NOTES
Pt attended reminiscence group  Pt cooperative and engaged however needed redirection to stay focused on topic  Pt preoccupied with thoughts of her past speaking of her father who was  or her mother not liking spiders  The group topic was a positive coping skills group and reflecting on motivational music or cinema  Continue to provide therapeutic group support

## 2018-10-09 NOTE — NURSING NOTE
Patient has been in bed asleep through the night  No concerns expressed, no apparent distress noted  Remains on suicide precautions, no concerns this shift  On routine Q 15 minute checks, will continue to monitor

## 2018-10-09 NOTE — PROGRESS NOTES
Psychiatry Progress Note    Subjective: Interval History     Patient [facility] with a [facility]  She feels somewhat uneasy about her discharge plan  She does not know exactly what her discharge options in detail at this time  The patient does feel that the medications have been helping her mood  She is sleeping well at night  Her mood appears to be less labile overall    Her Depakote level is being checked tomorrow    Current medications:    Current Facility-Administered Medications:     aspirin tablet 325 mg, 325 mg, Oral, Daily, Latrell Claire MD, 325 mg at 10/09/18 0808    atorvastatin (LIPITOR) tablet 40 mg, 40 mg, Oral, QPM, Ashu Faustin PA-C, 40 mg at 10/08/18 1722    bisacodyl (DULCOLAX) rectal suppository 10 mg, 10 mg, Rectal, Daily PRN, Jeet Szymanski PA-C, 10 mg at 10/02/18 8897    diphenhydrAMINE (BENADRYL) injection 50 mg, 50 mg, Intramuscular, Q4H PRN, Alireza Ferreira PA-C    divalproex sodium (DEPAKOTE) EC tablet 500 mg, 500 mg, Oral, BID, Cameron Numbers, CRNP, 500 mg at 10/09/18 0808    docusate sodium (COLACE) capsule 100 mg, 100 mg, Oral, BID PRN, Nicolasa Sharpe PA-C, 100 mg at 10/01/18 0844    doxepin (SINEquan) capsule 25 mg, 25 mg, Oral, HS, DWIGHT Kaba-C, 25 mg at 10/08/18 2119    enoxaparin (LOVENOX) subcutaneous injection 40 mg, 40 mg, Subcutaneous, Q24H [address], Ashu DWIGHT Salgado-C, 40 mg at 10/07/18 8528    gabapentin (NEURONTIN) capsule 300 mg, 300 mg, Oral, TID, DWIGHT Kaba-TRISTIAN, 300 mg at 10/09/18 0808    lactulose 20 g/30 mL oral solution 20 g, 20 g, Oral, BID PRN, Nicolasa Sharpe PA-C    levothyroxine tablet 137 mcg, 137 mcg, Oral, Early Morning, Samantha Maldonado PA-C, 137 mcg at 10/09/18 1421    LORazepam (ATIVAN) 2 mg/mL injection 0 5 mg, 0 5 mg, Intramuscular, Q4H PRN, Alireza Ferreira PA-C    LORazepam (ATIVAN) tablet 0 5 mg, 0 5 mg, Oral, Q4H PRN, lAireza Ferreira PA-C, 0 5 mg at 10/03/18 1823    melatonin tablet 6 mg, 6 mg, Oral, HS PRN, Latrell Lau PA-C    nicotine (NICODERM CQ) 14 mg/24hr TD 24 hr patch 1 patch, 1 patch, Transdermal, Daily, Page Lennon PA-C, 1 patch at 10/09/18 0809    nicotine polacrilex (NICORETTE) gum 2 mg, 2 mg, Oral, Q4H PRN, Latrell Lau PA-C, 2 mg at 10/08/18 1530    OLANZapine (ZyPREXA) IM injection 5 mg, 5 mg, Intramuscular, Q4H PRN, Latrell Lau PA-C    OLANZapine (ZyPREXA) tablet 5 mg, 5 mg, Oral, Q4H PRN, Latrell Lau PA-C    PARoxetine (PAXIL) tablet 20 mg, 20 mg, Oral, Daily, Alex Rawls MD, 20 mg at 10/09/18 0808    polyethylene glycol (MIRALAX) packet 17 g, 17 g, Oral, Daily PRN, AMBROCIO ShannonNP, 17 g at 10/01/18 0845    QUEtiapine (SEROquel) tablet 50 mg, 50 mg, Oral, BID, Edward Marie PA-C, 50 mg at 10/09/18 0808    senna-docusate sodium (SENOKOT S) 8 6-50 mg per tablet 1 tablet, 1 tablet, Oral, HS, AMBROCIO ShannonNP, 1 tablet at 10/08/18 2119    senna-docusate sodium (SENOKOT S) 8 6-50 mg per tablet 1 tablet, 1 tablet, Oral, BID PRN, Edward Marie PA-C, 1 tablet at 10/01/18 0845    traZODone (DESYREL) tablet 50 mg, 50 mg, Oral, HS PRN, Latrell Lau PA-C, 50 mg at 09/30/18 2136      Objective:     Vital Signs:  Vitals:    10/07/18 1539 10/08/18 0800 10/08/18 1530 10/09/18 0721   BP: (!) 75/50 109/69 109/71 95/62   BP Location: Right arm Right arm Right arm Left arm   Pulse: 79 66 84 74   Resp: 18 16 16 19   Temp: 98 5 °F (36 9 °C) (!) 97 1 °F (36 2 °C) 98 4 °F (36 9 °C) 98 9 °F (37 2 °C)   TempSrc: Temporal Temporal Temporal Temporal   SpO2: 96% 97% 98% 98%   Weight:       Height:             Appearance:  age appropriate and disheveled   Behavior:  evasive and guarded   Speech:  normal volume   Mood:  depressed and irritable   Affect:  constricted and inappropriate   Thought Process:  circumstantial and disorganized   Thought Content:  normal   Perceptual Disturbances: None   Risk Potential: none   Sensorium:  person, place, situation and time   Cognition:  grossly intact   Consciousness:  alert and awake    Attention: attention span appeared shorter than expected for age   Intellect: within normal limits   Insight:  poor   Judgment: poor      Motor Activity: no abnormal movements           Recent Labs:  Results Reviewed     None          I/O Past 24 hours:  I/O last 3 completed shifts: In: 1080 [P O :1080]  Out: -   No intake/output data recorded  Assessment / Plan:     [facility]    Recommended Treatment:      Medication changes:  1) Depakote level pending October 10th    Non-pharmacological treatments  1) Continue with group therapy, milieu therapy and occupational therapy  Safety  1) Safety/communication plan established targeting dynamic risk factors above  2) Risks, benefits, and possible side effects of medications explained to patient and patient verbalizes understanding  Counseling / Coordination of Care    Total floor / unit time spent today 20 minutes  Greater than 50% of total time was spent with the patient and / or family counseling and / or coordination of care  A description of the counseling / coordination of care  Patient's Rights, confidentiality and exceptions to confidentiality, use of automated medical record, [facility] to [facility] record, and consent to treatment reviewed      Aicha Strong PA-C

## 2018-10-09 NOTE — PLAN OF CARE
Ineffective Coping     Cooperates with admission process Completed          Alteration in Thoughts and Perception     Treatment Goal: Gain control of psychotic behaviors/thinking, reduce/eliminate presenting symptoms and demonstrate improved reality functioning upon discharge Progressing     Verbalize thoughts and feelings Progressing     Refrain from acting on delusional thinking/internal stimuli Progressing     Agree to be compliant with medication regime, as prescribed and report medication side effects Progressing     Attend and participate in unit activities, including therapeutic, recreational, and educational groups Progressing     Recognize dysfunctional thoughts, communicate reality-based thoughts at the time of discharge Progressing     Complete daily ADLs, including personal hygiene independently, as able Progressing        Anxiety     Anxiety is at manageable level Progressing        Depression     Treatment Goal: Demonstrate behavioral control of depressive symptoms, verbalize feelings of improved mood/affect, and adopt new coping skills prior to discharge Progressing     Verbalize thoughts and feelings Progressing     Refrain from harming self Progressing     Refrain from isolation Progressing     Refrain from self-neglect Progressing     Attend and participate in unit activities, including therapeutic, recreational, and educational groups Progressing     Complete daily ADLs, including personal hygiene independently, as able Progressing        Ineffective Coping     Identifies ineffective coping skills Progressing     Identifies healthy coping skills Progressing     Demonstrates healthy coping skills Progressing     Participates in unit activities Progressing     Patient/Family participate in treatment and DC plans Progressing     Patient/Family verbalizes awareness of resources Progressing     Understands least restrictive measures Progressing     Free from restraint events Progressing        Risk for Self Injury/Neglect     Treatment Goal: Remain safe during length of stay, learn and adopt new coping skills, and be free of self-injurious ideation, impulses and acts at the time of discharge Progressing     Verbalize thoughts and feelings Progressing     Refrain from harming self Progressing     Attend and participate in unit activities, including therapeutic, recreational, and educational groups Progressing     Recognize maladaptive responses and adopt new coping mechanisms Progressing     Complete daily ADLs, including personal hygiene independently, as able Progressing        Risk for Violence/Aggression Toward Others     Treatment Goal: Refrain from acts of violence/aggression during length of stay, and demonstrate improved impulse control at the time of discharge Progressing     Verbalize thoughts and feelings Progressing     Refrain from harming others Progressing     Refrain from destructive acts on the environment or property Progressing     Control angry outbursts Progressing     Attend and participate in unit activities, including therapeutic, recreational, and educational groups Progressing     Identify appropriate positive anger management techniques Progressing

## 2018-10-10 LAB
BASOPHILS # BLD AUTO: 0 THOUSANDS/ΜL (ref 0–0.1)
BASOPHILS NFR BLD AUTO: 0 % (ref 0–1)
EOSINOPHIL # BLD AUTO: 0.1 THOUSAND/ΜL (ref 0–0.4)
EOSINOPHIL NFR BLD AUTO: 1 % (ref 0–6)
ERYTHROCYTE [DISTWIDTH] IN BLOOD BY AUTOMATED COUNT: 19.8 %
HCT VFR BLD AUTO: 36.5 % (ref 36–46)
HGB BLD-MCNC: 11.4 G/DL (ref 12–16)
LYMPHOCYTES # BLD AUTO: 2.5 THOUSANDS/ΜL (ref 0.5–4)
LYMPHOCYTES NFR BLD AUTO: 31 % (ref 20–50)
MCH RBC QN AUTO: 22.7 PG (ref 26–34)
MCHC RBC AUTO-ENTMCNC: 31.2 G/DL (ref 31–36)
MCV RBC AUTO: 73 FL (ref 80–100)
MICROCYTES BLD QL AUTO: PRESENT
MONOCYTES # BLD AUTO: 0.6 THOUSAND/ΜL (ref 0.2–0.9)
MONOCYTES NFR BLD AUTO: 7 % (ref 1–10)
NEUTROPHILS # BLD AUTO: 5.1 THOUSANDS/ΜL (ref 1.8–7.8)
NEUTS SEG NFR BLD AUTO: 61 % (ref 45–65)
PLATELET # BLD AUTO: 303 THOUSANDS/UL (ref 150–450)
PLATELET BLD QL SMEAR: ADEQUATE
PMV BLD AUTO: 9.5 FL (ref 8.9–12.7)
RBC # BLD AUTO: 5 MILLION/UL (ref 4–5.2)
RBC MORPH BLD: NORMAL
VALPROATE SERPL-MCNC: 65.7 UG/ML (ref 50–120)
WBC # BLD AUTO: 8.3 THOUSAND/UL (ref 4.5–11)

## 2018-10-10 PROCEDURE — 97150 GROUP THERAPEUTIC PROCEDURES: CPT

## 2018-10-10 PROCEDURE — 85025 COMPLETE CBC W/AUTO DIFF WBC: CPT | Performed by: PHYSICIAN ASSISTANT

## 2018-10-10 PROCEDURE — 80164 ASSAY DIPROPYLACETIC ACD TOT: CPT | Performed by: NURSE PRACTITIONER

## 2018-10-10 RX ADMIN — SENNOSIDES AND DOCUSATE SODIUM 1 TABLET: 8.6; 5 TABLET ORAL at 21:53

## 2018-10-10 RX ADMIN — GABAPENTIN 300 MG: 300 CAPSULE ORAL at 16:32

## 2018-10-10 RX ADMIN — GABAPENTIN 300 MG: 300 CAPSULE ORAL at 08:27

## 2018-10-10 RX ADMIN — QUETIAPINE 50 MG: 50 TABLET ORAL at 17:25

## 2018-10-10 RX ADMIN — DIVALPROEX SODIUM 500 MG: 500 TABLET, DELAYED RELEASE ORAL at 17:24

## 2018-10-10 RX ADMIN — DIVALPROEX SODIUM 500 MG: 500 TABLET, DELAYED RELEASE ORAL at 08:27

## 2018-10-10 RX ADMIN — ASPIRIN 325 MG ORAL TABLET 325 MG: 325 PILL ORAL at 08:27

## 2018-10-10 RX ADMIN — LEVOTHYROXINE SODIUM 137 MCG: 112 TABLET ORAL at 05:56

## 2018-10-10 RX ADMIN — NICOTINE 1 PATCH: 14 PATCH, EXTENDED RELEASE TRANSDERMAL at 08:28

## 2018-10-10 RX ADMIN — PAROXETINE 20 MG: 20 TABLET, FILM COATED ORAL at 08:27

## 2018-10-10 RX ADMIN — ATORVASTATIN CALCIUM 40 MG: 40 TABLET, FILM COATED ORAL at 17:24

## 2018-10-10 RX ADMIN — GABAPENTIN 300 MG: 300 CAPSULE ORAL at 21:53

## 2018-10-10 RX ADMIN — QUETIAPINE 50 MG: 50 TABLET ORAL at 08:27

## 2018-10-10 RX ADMIN — NICOTINE POLACRILEX 2 MG: 2 GUM, CHEWING ORAL at 18:32

## 2018-10-10 RX ADMIN — DOXEPIN HYDROCHLORIDE 25 MG: 25 CAPSULE ORAL at 21:53

## 2018-10-10 NOTE — PROGRESS NOTES
Patient appears to be sleeping in bed  Respirations even, unlabored   Will continue to monitor per protocol

## 2018-10-10 NOTE — PROGRESS NOTES
Alert,awake and visible on the unit  Pt denies any thoughts of self harm at this time,SP1 precautions maintained  Pt reported improvement in her depressive symptoms  Med compliant  No distress noted  Will continue to monitor closely

## 2018-10-10 NOTE — PLAN OF CARE
Problem: OCCUPATIONAL THERAPY ADULT  Goal: Performs self-care activities at highest level of function for planned discharge setting  See evaluation for individualized goals  Treatment Interventions: ADL retraining, Continued evaluation, Activityengagement (coping skills instruction, life management instruction)          See flowsheet documentation for full assessment, interventions and recommendations  Outcome: Progressing  Limitation: Decreased Safe judgement during ADL, Decreased cognition, Visual deficit, Mood limitation (decreased coping skills, periodic thought disorganization)  Prognosis: Good  Assessment: Mikki was present for at least half of this morning OT Socialization program  She was pleasant, organized in her interactions  She was aware of the , Emma Bowen, and shared that she had even saw him in concert  She initiated relevant comments, listened to the songs  She was supportive of group process  Progress has been slow but consistent towards her goals  Her efforts and participation was commended

## 2018-10-10 NOTE — PROGRESS NOTES
Psychiatry Progress Note    Subjective: Interval History     Patient continues to report slow and steady progress with her depression improvement  She feels that she has been tolerating her medications well without side effect  She is pending a Depakote level today    No new issues to report in the past before hours    Current medications:    Current Facility-Administered Medications:     aspirin tablet 325 mg, 325 mg, Oral, Daily, Skylar Culp MD, 325 mg at 10/09/18 0808    atorvastatin (LIPITOR) tablet 40 mg, 40 mg, Oral, QPM, Lora Faustin PA-C, 40 mg at 10/09/18 1700    bisacodyl (DULCOLAX) rectal suppository 10 mg, 10 mg, Rectal, Daily PRN, Shikha Nguyen PA-C, 10 mg at 10/02/18 2262    diphenhydrAMINE (BENADRYL) injection 50 mg, 50 mg, Intramuscular, Q4H PRN, Tanika Aguayo PA-C    divalproex sodium (DEPAKOTE) EC tablet 500 mg, 500 mg, Oral, BID, [place], CRNP, 500 mg at 10/09/18 1700    docusate sodium (COLACE) capsule 100 mg, 100 mg, Oral, BID PRN, Lizbeth Barrios PA-C, 100 mg at 10/01/18 0844    doxepin (SINEquan) capsule 25 mg, 25 mg, Oral, HS, Tanika Aguayo PA-C, 25 mg at 10/09/18 2136    enoxaparin (LOVENOX) subcutaneous injection 40 mg, 40 mg, Subcutaneous, Q24H [facility], Lora Faustin PA-C, 40 mg at 10/07/18 7244    gabapentin (NEURONTIN) capsule 300 mg, 300 mg, Oral, TID, Tanika Aguayo PA-C, 300 mg at 10/09/18 2136    lactulose 20 g/30 mL oral solution 20 g, 20 g, Oral, BID PRN, Lizbeth Barrios PA-C    levothyroxine tablet 137 mcg, 137 mcg, Oral, Early Morning, Roxana Chand PA-C, 137 mcg at 10/10/18 0556    LORazepam (ATIVAN) 2 mg/mL injection 0 5 mg, 0 5 mg, Intramuscular, Q4H PRN, Tanika Aguayo PA-C    LORazepam (ATIVAN) tablet 0 5 mg, 0 5 mg, Oral, Q4H PRN, Tanika Aguayo PA-C, 0 5 mg at 10/03/18 1823    melatonin tablet 6 mg, 6 mg, Oral, HS PRN, Tanika Aguayo PA-C    nicotine (NICODERM CQ) 14 mg/24hr TD 24 hr patch 1 patch, 1 patch, Transdermal, Daily, Giovanni Pratt PA-C, 1 patch at 10/09/18 0809    nicotine polacrilex (NICORETTE) gum 2 mg, 2 mg, Oral, Q4H PRN, Moshe Crews PA-C, 2 mg at 10/09/18 1704    OLANZapine (ZyPREXA) IM injection 5 mg, 5 mg, Intramuscular, Q4H PRN, Moshe Crews PA-C    OLANZapine (ZyPREXA) tablet 5 mg, 5 mg, Oral, Q4H PRN, Moshe Crews PA-C    PARoxetine (PAXIL) tablet 20 mg, 20 mg, Oral, Daily, Jo-Ann Aden MD, 20 mg at 10/09/18 0808    polyethylene glycol (MIRALAX) packet 17 g, 17 g, Oral, Daily PRN, NADEEN Jackson, 17 g at 10/01/18 0845    QUEtiapine (SEROquel) tablet 50 mg, 50 mg, Oral, BID, Luis Fernando Espinosa PA-C, 50 mg at 10/09/18 1700    senna-docusate sodium (SENOKOT S) 8 6-50 mg per tablet 1 tablet, 1 tablet, Oral, HS, NADEEN Jackson, 1 tablet at 10/09/18 2136    senna-docusate sodium (SENOKOT S) 8 6-50 mg per tablet 1 tablet, 1 tablet, Oral, BID PRN, Luis Fernando Espinosa PA-C, 1 tablet at 10/01/18 0845    traZODone (DESYREL) tablet 50 mg, 50 mg, Oral, HS PRN, Moshe Crews PA-C, 50 mg at 09/30/18 2136      Objective:     Vital Signs:  Vitals:    10/08/18 1530 10/09/18 0721 10/09/18 0812 10/09/18 1527   BP: 109/71 95/62  100/61   BP Location: Right arm Left arm  Right arm   Pulse: 84 74  74   Resp: 16 19 18   Temp: 98 4 °F (36 9 °C) 98 9 °F (37 2 °C)  (!) 96 9 °F (36 1 °C)   TempSrc: Temporal Temporal  Temporal   SpO2: 98% 98%  98%   Weight:   77 8 kg (171 lb 8 oz)    Height:             Appearance:  age appropriate and disheveled   Behavior:  evasive and guarded   Speech:  normal volume   Mood:  depressed and irritable   Affect:  constricted and inappropriate   Thought Process:  circumstantial and disorganized   Thought Content:  normal   Perceptual Disturbances: None   Risk Potential: none   Sensorium:  person, place, situation and time   Cognition:  grossly intact   Consciousness:  alert and awake    Attention: attention span appeared shorter than expected for age   Intellect: within normal limits   Insight:  poor   Judgment: poor      Motor Activity: no abnormal movements           Recent Labs:  Results Reviewed     None          I/O Past 24 hours:  I/O last 3 completed shifts: In: 780 [P O :780]  Out: -   No intake/output data recorded  Assessment / Plan:     Major depression with psychotic features ([facility] )    Recommended Treatment:      Medication changes:  1) Depakote level pending today    Non-pharmacological treatments  1) Continue with group therapy, milieu therapy and occupational therapy  Safety  1) Safety/communication plan established targeting dynamic risk factors above  2) Risks, benefits, and possible side effects of medications explained to patient and patient verbalizes understanding  Counseling / Coordination of Care    Total floor / unit time spent today 20 minutes  Greater than 50% of total time was spent with the patient and / or family counseling and / or coordination of care  A description of the counseling / coordination of care  Patient's Rights, confidentiality and exceptions to confidentiality, use of automated medical record, [facility] staff access to medical record, and consent to treatment reviewed      Vira Pena PA-C

## 2018-10-10 NOTE — OCCUPATIONAL THERAPY NOTE
Occupational Therapy Group Treatment Note      Reji Preciado    10/10/2018    Patient Active Problem List   Diagnosis    History of CVA (cerebrovascular accident)    Tobacco use    Moyamoya disease    Leukocytosis    Hypothyroidism    Psychosocial problem    Homeless    Dizziness    Miscarriage within last 12 months    Syncope    [facility]    Blurry vision, left eye    Major depression with psychotic features ([facility] )       Past Medical History:   Diagnosis Date    Disease of thyroid gland     History of CVA (cerebrovascular accident)     Hypertension     Moyamoya disease     Psychiatric disorder     Stroke ([facility] )     Tobacco use        Past Surgical History:   Procedure Laterality Date    BRAIN SURGERY      BRAIN SURGERY  04/10/2017    moyamoya        10/10/18 1130   Assessment   Assessment Mikki was present for at least half of this morning OT Socialization program  She was pleasant, organized in her interactions  She was aware of the , Kortney Rawls, and shared that she had even saw him in concert  She initiated relevant comments, listened to the songs  She was supportive of group process  Progress has been slow but consistent towards her goals  Her efforts and participation was commended  Plan   Treatment Interventions ADL retraining;Continued evaluation; Activityengagement  (coping skills instruction, life management instruction)   Goal Expiration Date 11/02/18   Treatment Day 8   OT Frequency 5x/wk   Dash De Paz OT

## 2018-10-10 NOTE — SOCIAL WORK
[facility] approved transport for Fri  They directed SW to call Fri with time and they will have  available  within 15 minutes of notification  SW will continue to follow up

## 2018-10-10 NOTE — PROGRESS NOTES
Pt maintained on SP1 precautions,pt denies any thoughts of self harm  Will continue to monitor closely

## 2018-10-11 RX ORDER — DIVALPROEX SODIUM 500 MG/1
500 TABLET, DELAYED RELEASE ORAL 2 TIMES DAILY
Qty: 60 TABLET | Refills: 0 | Status: SHIPPED | OUTPATIENT
Start: 2018-10-11

## 2018-10-11 RX ORDER — PAROXETINE HYDROCHLORIDE 20 MG/1
20 TABLET, FILM COATED ORAL DAILY
Qty: 30 TABLET | Refills: 0 | Status: SHIPPED | OUTPATIENT
Start: 2018-10-12

## 2018-10-11 RX ORDER — LANOLIN ALCOHOL/MO/W.PET/CERES
6 CREAM (GRAM) TOPICAL
Qty: 30 TABLET | Refills: 0 | Status: SHIPPED | OUTPATIENT
Start: 2018-10-11

## 2018-10-11 RX ORDER — LEVOTHYROXINE SODIUM 137 UG/1
137 TABLET ORAL
Qty: 30 TABLET | Refills: 0 | Status: SHIPPED | OUTPATIENT
Start: 2018-10-12

## 2018-10-11 RX ORDER — ASPIRIN 325 MG
325 TABLET ORAL DAILY
Qty: 30 TABLET | Refills: 0 | Status: SHIPPED | OUTPATIENT
Start: 2018-10-12

## 2018-10-11 RX ORDER — QUETIAPINE FUMARATE 50 MG/1
50 TABLET, FILM COATED ORAL 2 TIMES DAILY
Qty: 60 TABLET | Refills: 0 | Status: SHIPPED | OUTPATIENT
Start: 2018-10-11

## 2018-10-11 RX ORDER — GABAPENTIN 300 MG/1
300 CAPSULE ORAL 3 TIMES DAILY
Qty: 90 CAPSULE | Refills: 0 | Status: SHIPPED | OUTPATIENT
Start: 2018-10-11

## 2018-10-11 RX ORDER — DOXEPIN HYDROCHLORIDE 25 MG/1
25 CAPSULE ORAL
Qty: 30 CAPSULE | Refills: 0 | Status: SHIPPED | OUTPATIENT
Start: 2018-10-11

## 2018-10-11 RX ADMIN — QUETIAPINE 50 MG: 50 TABLET ORAL at 08:42

## 2018-10-11 RX ADMIN — DIVALPROEX SODIUM 500 MG: 500 TABLET, DELAYED RELEASE ORAL at 17:04

## 2018-10-11 RX ADMIN — GABAPENTIN 300 MG: 300 CAPSULE ORAL at 21:39

## 2018-10-11 RX ADMIN — DIVALPROEX SODIUM 500 MG: 500 TABLET, DELAYED RELEASE ORAL at 08:42

## 2018-10-11 RX ADMIN — ASPIRIN 325 MG ORAL TABLET 325 MG: 325 PILL ORAL at 08:42

## 2018-10-11 RX ADMIN — PAROXETINE 20 MG: 20 TABLET, FILM COATED ORAL at 08:42

## 2018-10-11 RX ADMIN — LEVOTHYROXINE SODIUM 137 MCG: 112 TABLET ORAL at 05:23

## 2018-10-11 RX ADMIN — NICOTINE POLACRILEX 2 MG: 2 GUM, CHEWING ORAL at 17:30

## 2018-10-11 RX ADMIN — GABAPENTIN 300 MG: 300 CAPSULE ORAL at 17:04

## 2018-10-11 RX ADMIN — QUETIAPINE 50 MG: 50 TABLET ORAL at 17:04

## 2018-10-11 RX ADMIN — ATORVASTATIN CALCIUM 40 MG: 40 TABLET, FILM COATED ORAL at 17:04

## 2018-10-11 RX ADMIN — GABAPENTIN 300 MG: 300 CAPSULE ORAL at 08:42

## 2018-10-11 RX ADMIN — DOXEPIN HYDROCHLORIDE 25 MG: 25 CAPSULE ORAL at 21:39

## 2018-10-11 RX ADMIN — SENNOSIDES AND DOCUSATE SODIUM 1 TABLET: 8.6; 5 TABLET ORAL at 21:39

## 2018-10-11 RX ADMIN — NICOTINE 1 PATCH: 14 PATCH, EXTENDED RELEASE TRANSDERMAL at 08:49

## 2018-10-11 NOTE — PROGRESS NOTES
Alert,awake and visible this morning  Pleasant and cooperative with staff  Med compliant  Pt denies any issues at this time  SP1 precautions maintained,pt denies any thoughts of self harm   Will continue to monitor closely

## 2018-10-11 NOTE — PLAN OF CARE
Alteration in Thoughts and Perception     Treatment Goal: Gain control of psychotic behaviors/thinking, reduce/eliminate presenting symptoms and demonstrate improved reality functioning upon discharge Adequate for Discharge     Verbalize thoughts and feelings Adequate for Discharge     Refrain from acting on delusional thinking/internal stimuli Adequate for Discharge     Agree to be compliant with medication regime, as prescribed and report medication side effects Adequate for Discharge     Attend and participate in unit activities, including therapeutic, recreational, and educational groups Adequate for Discharge     Recognize dysfunctional thoughts, communicate reality-based thoughts at the time of discharge Adequate for Discharge     Complete daily ADLs, including personal hygiene independently, as able Adequate for Discharge        Anxiety     Anxiety is at manageable level Adequate for Discharge        Depression     Treatment Goal: Demonstrate behavioral control of depressive symptoms, verbalize feelings of improved mood/affect, and adopt new coping skills prior to discharge Adequate for Discharge     Verbalize thoughts and feelings Adequate for Discharge     Refrain from harming self Adequate for Discharge     Refrain from isolation Adequate for Discharge     Refrain from self-neglect Adequate for Discharge     Attend and participate in unit activities, including therapeutic, recreational, and educational groups Adequate for Discharge     Complete daily ADLs, including personal hygiene independently, as able Adequate for Discharge        Ineffective Coping     Identifies ineffective coping skills Adequate for Discharge     Identifies healthy coping skills Adequate for Discharge     Demonstrates healthy coping skills Adequate for Discharge     Participates in unit activities Adequate for Discharge     Patient/Family participate in treatment and DC plans Adequate for Discharge     Patient/Family verbalizes awareness of resources Adequate for Discharge     Understands least restrictive measures Adequate for Discharge     Free from restraint events Adequate for Discharge        Nutrition/Hydration-ADULT     Nutrient/Hydration intake appropriate for improving, restoring or maintaining nutritional needs Adequate for Discharge        Risk for Self Injury/Neglect     Treatment Goal: Remain safe during length of stay, learn and adopt new coping skills, and be free of self-injurious ideation, impulses and acts at the time of discharge Adequate for Discharge     Verbalize thoughts and feelings Adequate for Discharge     Refrain from harming self Adequate for Discharge     Attend and participate in unit activities, including therapeutic, recreational, and educational groups Adequate for Discharge     Recognize maladaptive responses and adopt new coping mechanisms Adequate for Discharge     Complete daily ADLs, including personal hygiene independently, as able Adequate for Discharge        Risk for Violence/Aggression Toward Others     Treatment Goal: Refrain from acts of violence/aggression during length of stay, and demonstrate improved impulse control at the time of discharge Adequate for Discharge     Verbalize thoughts and feelings Adequate for Discharge     Refrain from harming others Adequate for Discharge     Refrain from destructive acts on the environment or property Adequate for Discharge     Control angry outbursts Adequate for Discharge     Attend and participate in unit activities, including therapeutic, recreational, and educational groups Adequate for Discharge     Identify appropriate positive anger management techniques Adequate for Discharge

## 2018-10-11 NOTE — PROGRESS NOTES
Patient appears to be sleeping in  her bed  She shows no signs of harmful behavior or distress  Will continue to monitor

## 2018-10-11 NOTE — SOCIAL WORK
BA left message for [facility] to set up psychiatric appt  BA will continue to follow up

## 2018-10-11 NOTE — DISCHARGE INSTR - OTHER ORDERS
Please register with [facility] [employer] [facility] by Nov 1st to assist you in the winter months  Also please continue to keep your phone on and be in contact with your BCM  Sandi Saint  will meet with you mon 10/15 at 4:30 in [facility] to assist in transition  BCM will also assist you in the process to receive ACT services  Your BCM mon will also bring you [employer] phone cards  At [place] pt is going to her boyfriend imelda [place]shaw [employer] as discussed in [facility] [address] with pt BCM corky and [address]  [place] address [address] [place]  Patient was determined by PA  as Nursing Home eligible  Patient was made aware of this

## 2018-10-11 NOTE — SOCIAL WORK
Meeting held on 10/10/18  Yue olguin [facility] and her supervisor Radha Torres came to meet with SW about DC planning from [facility]  Pt also attended meeting  They are in agreement with pt DC fri 10/12/18  DC plan will be pt will be going to her [facility] in the transition and continue to work with [facility] to establish permanent  housing through the housing authority  Pt  is on the waitlist  for the \[facility]\"" authority  PT BCM will be increasing pt services to [facility] since pt will be moving to her [facility] in [facility]  Pt agreed to keep [facility] active until she gets ACT services  Pt will meeting  with her BCM on mon 10/15/18 at 4:30  Pt has a cell phone and agrees to continue to keep in contact [facility]  Pt will see Dr Rollo Sever from [facility] for psychiatric services  DC isntructions should be faxed to Selina Manning at [facility]  Fax # [phone]  Pt  address is [address]  SW will continue to follow up

## 2018-10-11 NOTE — PROGRESS NOTES
Patient is visible on the unit  She denies any thoughts of suicide  Patient's behaviors has been appropriate  She denies any needs at this time  Will continue to monitor on sp1 for safety and support

## 2018-10-11 NOTE — PROGRESS NOTES
Psychiatry Progress Note    Subjective: Interval History     Patient had a meeting with her behavioral  yesterday  Patient continues to show and steady improvement  She has been tolerating her medications well without side effect  Depakote level was found to be therapeutic at 65 7 yesterday on October 10th  Patient continues to report her mood is improving    She feels her depression and anxiety are significantly improved    Current medications:      Current Facility-Administered Medications:     aspirin tablet 325 mg, 325 mg, Oral, Daily, Skylar Culp MD, 325 mg at 10/11/18 0842    atorvastatin (LIPITOR) tablet 40 mg, 40 mg, Oral, QPM, Lora Lyme LUIZA Faustin, 40 mg at 10/10/18 1724    bisacodyl (DULCOLAX) rectal suppository 10 mg, 10 mg, Rectal, Daily PRN, Shikha Nguyen PA-C, 10 mg at 10/02/18 1906    diphenhydrAMINE (BENADRYL) injection 50 mg, 50 mg, Intramuscular, Q4H PRN, Tanika Aguayo PA-C    divalproex sodium (DEPAKOTE) EC tablet 500 mg, 500 mg, Oral, BID, Rendell Congo, CRNP, 500 mg at 10/11/18 1957    docusate sodium (COLACE) capsule 100 mg, 100 mg, Oral, BID PRN, Lizbeth Barrios PA-C, 100 mg at 10/01/18 0844    doxepin (SINEquan) capsule 25 mg, 25 mg, Oral, HS, Tanika Aguayo PA-C, 25 mg at 10/10/18 2153    enoxaparin (LOVENOX) subcutaneous injection 40 mg, 40 mg, Subcutaneous, Q24H [address], Neoma Lyme LUIZA Faustin, 40 mg at 10/07/18 6420    gabapentin (NEURONTIN) capsule 300 mg, 300 mg, Oral, TID, Tanika Aguayo PA-C, 300 mg at 10/11/18 4841    lactulose 20 g/30 mL oral solution 20 g, 20 g, Oral, BID PRN, Lizbeth Barrios PA-C    levothyroxine tablet 137 mcg, 137 mcg, Oral, Early Morning, Roxana Chand PA-C, 137 mcg at 10/11/18 0523    LORazepam (ATIVAN) 2 mg/mL injection 0 5 mg, 0 5 mg, Intramuscular, Q4H PRN, Tanika Aguayo PA-C    LORazepam (ATIVAN) tablet 0 5 mg, 0 5 mg, Oral, Q4H PRN, Tanika Aguayo PA-C, 0 5 mg at 10/03/18 182   melatonin tablet 6 mg, 6 mg, Oral, HS PRN, Luvenia Mustache, PA-C    nicotine (NICODERM CQ) 14 mg/24hr TD 24 hr patch 1 patch, 1 patch, Transdermal, Daily, Carter Gerard PA-C, 1 patch at 10/11/18 0849    nicotine polacrilex (NICORETTE) gum 2 mg, 2 mg, Oral, Q4H PRN, Luvenia Mustache, PA-C, 2 mg at 10/10/18 1832    OLANZapine (ZyPREXA) IM injection 5 mg, 5 mg, Intramuscular, Q4H PRN, Luvenia Mustache, PA-C    OLANZapine (ZyPREXA) tablet 5 mg, 5 mg, Oral, Q4H PRN, Luvenia Mustache, PA-C    PARoxetine (PAXIL) tablet 20 mg, 20 mg, Oral, Daily, Kris De La Cruz MD, 20 mg at 10/11/18 1785    polyethylene glycol (MIRALAX) packet 17 g, 17 g, Oral, Daily PRN, Camacho Meline, CRNP, 17 g at 10/01/18 0845    QUEtiapine (SEROquel) tablet 50 mg, 50 mg, Oral, BID, Krissy Beady, PA-C, 50 mg at 10/11/18 5515    senna-docusate sodium (SENOKOT S) 8 6-50 mg per tablet 1 tablet, 1 tablet, Oral, HS, Camacho Meline, CRNP, 1 tablet at 10/10/18 2153    senna-docusate sodium (SENOKOT S) 8 6-50 mg per tablet 1 tablet, 1 tablet, Oral, BID PRN, Krissy Beady, PA-C, 1 tablet at 10/01/18 0845    traZODone (DESYREL) tablet 50 mg, 50 mg, Oral, HS PRN, Luvenia Mustache, PA-C, 50 mg at 09/30/18 2136      Objective:     Vital Signs:  Vitals:    10/10/18 0745 10/10/18 1500 10/10/18 1910 10/11/18 0720   BP: 110/78 (!) 85/51 (!) 101/49 98/59   BP Location: Left arm Right arm  Right arm   Pulse: 93 78  82   Resp: 20 19  16   Temp: (!) 96 4 °F (35 8 °C) 99 1 °F (37 3 °C)  (!) 96 3 °F (35 7 °C)   TempSrc: Temporal Temporal  Temporal   SpO2: 97% 97%  97%   Weight:       Height:             Appearance:  age appropriate and disheveled   Behavior:  evasive and guarded   Speech:  normal volume   Mood:  depressed and irritable   Affect:  constricted and inappropriate   Thought Process:  circumstantial and disorganized   Thought Content:  normal   Perceptual Disturbances: None   Risk Potential: none   Sensorium:  person, place, situation and time   Cognition:  grossly intact   Consciousness:  alert and awake    Attention: attention span appeared shorter than expected for age   Intellect: within normal limits   Insight:  poor   Judgment: poor      Motor Activity: no abnormal movements           Recent Labs:  Results Reviewed     None          I/O Past 24 hours:  I/O last 3 completed shifts: In: 1200 [P O :1200]  Out: 2 [Stool:2]  I/O this shift:  In: 360 [P O :360]  Out: -         Assessment / Plan:     Major depression with psychotic features ([facility] )    Recommended Treatment:      Medication changes:  1) continue current medications at current doses  Depakote level was found to be therapeutic yesterday    Non-pharmacological treatments  1) Continue with group therapy, milieu therapy and occupational therapy  Safety  1) Safety/communication plan established targeting dynamic risk factors above  2) Risks, benefits, and possible side effects of medications explained to patient and patient verbalizes understanding  Counseling / Coordination of Care    Total floor / unit time spent today 20 minutes  Greater than 50% of total time was spent with the patient and / or family counseling and / or coordination of care  A description of the counseling / coordination of care  Patient's Rights, confidentiality and exceptions to confidentiality, use of automated medical record, [facility] staff access to medical record, and consent to treatment reviewed      Krissy Ko PA-C

## 2018-10-11 NOTE — PROGRESS NOTES
Pt maintained on SP1 precautions, pt denies any thoughts of self harm at this time  No distress noted  No complaints at this time  Will continue to monitor closely

## 2018-10-12 VITALS
BODY MASS INDEX: 30.39 KG/M2 | WEIGHT: 171.5 LBS | RESPIRATION RATE: 18 BRPM | HEART RATE: 75 BPM | DIASTOLIC BLOOD PRESSURE: 58 MMHG | SYSTOLIC BLOOD PRESSURE: 119 MMHG | OXYGEN SATURATION: 99 % | HEIGHT: 63 IN | TEMPERATURE: 98.2 F

## 2018-10-12 PROCEDURE — 97150 GROUP THERAPEUTIC PROCEDURES: CPT

## 2018-10-12 RX ADMIN — NICOTINE 1 PATCH: 14 PATCH, EXTENDED RELEASE TRANSDERMAL at 09:10

## 2018-10-12 RX ADMIN — NICOTINE POLACRILEX 2 MG: 2 GUM, CHEWING ORAL at 13:54

## 2018-10-12 RX ADMIN — ASPIRIN 325 MG ORAL TABLET 325 MG: 325 PILL ORAL at 08:48

## 2018-10-12 RX ADMIN — LEVOTHYROXINE SODIUM 137 MCG: 112 TABLET ORAL at 06:08

## 2018-10-12 RX ADMIN — GABAPENTIN 300 MG: 300 CAPSULE ORAL at 08:48

## 2018-10-12 RX ADMIN — PAROXETINE 20 MG: 20 TABLET, FILM COATED ORAL at 08:48

## 2018-10-12 RX ADMIN — QUETIAPINE 50 MG: 50 TABLET ORAL at 08:48

## 2018-10-12 RX ADMIN — DIVALPROEX SODIUM 500 MG: 500 TABLET, DELAYED RELEASE ORAL at 08:48

## 2018-10-12 NOTE — PLAN OF CARE
Problem: OCCUPATIONAL THERAPY ADULT  Goal: Performs self-care activities at highest level of function for planned discharge setting  See evaluation for individualized goals  Treatment Interventions: ADL retraining, Continued evaluation, Activityengagement (coping skills instruction, life management instruction)          See flowsheet documentation for full assessment, interventions and recommendations  Outcome: Adequate for Discharge  Limitation: Decreased Safe judgement during ADL, Decreased cognition, Visual deficit, Mood limitation (decreased coping skills, periodic thought disorganization)  Prognosis: Good  Assessment: Thomas Bermudez was present for most of this OT Life Management program  She was alert, attentive to group discussion on cognitive task on rebuses  She was attentive and participatory in discussion task, "Tell It Like It Is"  Her affect was friendly  Her comments were positive and organized  Progress has been consistent towards her goals  Discontinue further OT assignment due to upcoming discharge from [facility]  OT - OK to Discharge:  Yes

## 2018-10-12 NOTE — SOCIAL WORK
Voicemail left for [facility]  in [place]  to set up psychiatric appt with Dr Dayanara Treviño  Phone #[phone]  SW will continue to follow up

## 2018-10-12 NOTE — SOCIAL WORK
Pt was picked up by [place] scheduled through [address] transport   at approximately 3:30 PM today 10/12 for transport to her [facility] [place]  SW made sure pt safely boarded [employer] vehicle and confirmed  was picking up correct pt and had correct destination address  Pt had no issues and DC was successful  SW will continue to follow up

## 2018-10-12 NOTE — OCCUPATIONAL THERAPY NOTE
Occupational Therapy Group Treatment Note      Dulce Causey    10/12/2018    Patient Active Problem List   Diagnosis    History of CVA (cerebrovascular accident)    Tobacco use    Moyamoya disease    Leukocytosis    Hypothyroidism    Psychosocial problem    Homeless    Dizziness    Miscarriage within last 12 months    Syncope    Acute [facility]A (cerebro[facility]) [facility]    Blurry vision, left eye    Major depression with psychotic features ([facility] )       Past Medical History:   Diagnosis Date    Disease of thyroid gland     History of CVA (cerebrovascular accident)     Hypertension     Moyamoya disease     Psychiatric disorder     Stroke ([facility]     Tobacco use        Past Surgical History:   Procedure Laterality Date    BRAIN SURGERY      BRAIN SURGERY  04/10/2017    moyamoya        10/12/18 1010   Assessment   Assessment Carolann Boone was present for most of this OT Life Management program  She was alert, attentive to group discussion on cognitive task on rebuses  She was attentive and participatory in discussion task, "Tell It Like It Is"  Her affect was friendly  Her comments were positive and organized  Progress has been consistent towards her goals  Discontinue further OT assignment due to upcoming discharge from [facility]  Plan   Treatment Interventions ADL retraining;Continued evaluation; Activityengagement  (coping skills instruction, life management instruction)   Goal Expiration Date 11/02/18   Treatment Day 10   OT Frequency 5x/wk   Recommendation   OT - OK to Discharge Yes   Glenna Mueller OT

## 2018-10-12 NOTE — SOCIAL WORK
[facility] department fax #[phone]  SW will  continue to follow up as necessary

## 2018-10-12 NOTE — NURSING NOTE
Patient has been discharge off the unit at this time  Discharge instructions and rx reviewed and provided to patient Belongings reviewed, accompanied to lobby by Jose FAJARDO

## 2018-10-12 NOTE — NURSING NOTE
Patient is currently sleeping in the bed in her room  Respirations even, unlabored  Will continue to monitor

## 2018-10-12 NOTE — PLAN OF CARE

## 2018-10-12 NOTE — SOCIAL WORK
SW received letter from [place] regarding LCD for SNF  They deemed pt eligible for nursing home  Since  pt was cleared today  and deemed safe for DC to BF house Víctor Martínez spoke with pt on her thoughts  Pt stated she does not want to stay in hospital any longer or to wait until she is placed in SNF  She wants to return to her BF house  Pt has not been deemed incompetent and is not a danger to herself or others  BA called Kacie at [facility] optioning  to gain additional info on lCD by state  Kacie stated that LCD will be valid in community for 1-2 weeks and BCJAYNE fried can assist pt in community to get  placed in SNF  Kacie also stated that LCD is good in any county for 1-2 weeks  Kacie also stated that if pt is competent and deemed appropriate  for SNF hospital is not legally obligated to place pt in SNF  Ishmaeljames Bills is aware that pt will be going to her BF house in [facility]  Rachael Pabon in Risk management was notified per recommendation of josiah nichole  and in agreement with DC plan since Dr Adonay Flanagan feels pt is safe for DC and put in DC order  She also stated  since pt is competent she is able to make her own decisions on her DC placement  Dr Adonay Flanagan was made aware prior to DC date that pt will be going to BF house rather than tent  SW will will place lCD documents in chart for pt and make pt [facility] Marilyn aware of new developments  A copy of LCD will be given to [facility] and pt  SW will continue to follow up as necessary

## 2018-10-12 NOTE — SOCIAL WORK
Patient is being discharged, no SI/HI, no psychosis or A/V  Patient is in agreement with discharge  No questions verbalized  Patient provided with after care appointment, discharge instructions and prescriptions  IMM, core measures, transition of care, DC instructions, and treatment plan completed  Pt is being DC to her [facility] Conor COSME will continue to follow up as necessary

## 2018-10-12 NOTE — NURSING NOTE
Patient has been visible in unit, pleasant and cooperative on approach  No concerns expressed, at this time, has been compliant with medications and meals  Labs, orders and vital signs have been reviewed  To be discharged today  On routine Q 15 minute checks, will continue to monitor

## 2018-10-12 NOTE — SOCIAL WORK
BA notified Ryan Sotomayor pt BCM through [facility] via phone  of pt DC and that LCD is good in community for 1-2 weeks and that LCD stated pt is SNF appropriate  SW notified Ryan Sotomayor that pt did not want to stay in hospital any longer and is in agreement  Ryan Sotomayor was in agreement  SW will continue to follow up
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
## 2018-12-07 ENCOUNTER — APPOINTMENT (EMERGENCY)
Dept: RADIOLOGY | Facility: HOSPITAL | Age: 38
End: 2018-12-07
Payer: COMMERCIAL

## 2018-12-07 ENCOUNTER — HOSPITAL ENCOUNTER (EMERGENCY)
Facility: HOSPITAL | Age: 38
Discharge: HOME | End: 2018-12-07
Attending: EMERGENCY MEDICINE
Payer: COMMERCIAL

## 2018-12-07 VITALS
TEMPERATURE: 98.9 F | DIASTOLIC BLOOD PRESSURE: 64 MMHG | HEIGHT: 61 IN | SYSTOLIC BLOOD PRESSURE: 119 MMHG | WEIGHT: 180 LBS | OXYGEN SATURATION: 100 % | BODY MASS INDEX: 33.99 KG/M2 | RESPIRATION RATE: 16 BRPM | HEART RATE: 83 BPM

## 2018-12-07 DIAGNOSIS — R42 DIZZINESS: Primary | ICD-10-CM

## 2018-12-07 LAB
ALBUMIN SERPL BCP-MCNC: 3.4 G/DL (ref 3.5–5)
ALP SERPL-CCNC: 88 U/L (ref 46–116)
ALT SERPL W P-5'-P-CCNC: 23 U/L (ref 12–78)
ANION GAP SERPL CALCULATED.3IONS-SCNC: 10 MMOL/L (ref 4–13)
APTT PPP: 27 SECONDS (ref 26–38)
AST SERPL W P-5'-P-CCNC: 10 U/L (ref 5–45)
BASE EX.OXY STD BLDV CALC-SCNC: 88.4 % (ref 60–80)
BASE EXCESS BLDV CALC-SCNC: 0.7 MMOL/L
BASOPHILS # BLD AUTO: 0.04 THOUSANDS/ΜL (ref 0–0.1)
BASOPHILS NFR BLD AUTO: 0 % (ref 0–1)
BILIRUB SERPL-MCNC: 0.2 MG/DL (ref 0.2–1)
BILIRUB UR QL STRIP: NEGATIVE
BUN SERPL-MCNC: 8 MG/DL (ref 5–25)
CALCIUM SERPL-MCNC: 8.7 MG/DL (ref 8.3–10.1)
CHLORIDE SERPL-SCNC: 103 MMOL/L (ref 100–108)
CLARITY UR: CLEAR
CO2 SERPL-SCNC: 26 MMOL/L (ref 21–32)
COLOR UR: YELLOW
CREAT SERPL-MCNC: 0.64 MG/DL (ref 0.6–1.3)
EOSINOPHIL # BLD AUTO: 0.18 THOUSAND/ΜL (ref 0–0.61)
EOSINOPHIL NFR BLD AUTO: 2 % (ref 0–6)
ERYTHROCYTE [DISTWIDTH] IN BLOOD BY AUTOMATED COUNT: 18.6 % (ref 11.6–15.1)
GFR SERPL CREATININE-BSD FRML MDRD: 112 ML/MIN/1.73SQ M
GLUCOSE SERPL-MCNC: 109 MG/DL (ref 65–140)
GLUCOSE UR STRIP-MCNC: NEGATIVE MG/DL
HCO3 BLDV-SCNC: 25 MMOL/L (ref 24–30)
HCT VFR BLD AUTO: 35.7 % (ref 34.8–46.1)
HGB BLD-MCNC: 11 G/DL (ref 11.5–15.4)
HGB UR QL STRIP.AUTO: NEGATIVE
IMM GRANULOCYTES # BLD AUTO: 0.03 THOUSAND/UL (ref 0–0.2)
IMM GRANULOCYTES NFR BLD AUTO: 0 % (ref 0–2)
INR PPP: 0.94 (ref 0.86–1.17)
KETONES UR STRIP-MCNC: NEGATIVE MG/DL
LACTATE SERPL-SCNC: 1.7 MMOL/L (ref 0.5–2)
LEUKOCYTE ESTERASE UR QL STRIP: NEGATIVE
LIPASE SERPL-CCNC: 164 U/L (ref 73–393)
LYMPHOCYTES # BLD AUTO: 2.83 THOUSANDS/ΜL (ref 0.6–4.47)
LYMPHOCYTES NFR BLD AUTO: 28 % (ref 14–44)
MAGNESIUM SERPL-MCNC: 1.9 MG/DL (ref 1.6–2.6)
MCH RBC QN AUTO: 23.7 PG (ref 26.8–34.3)
MCHC RBC AUTO-ENTMCNC: 30.8 G/DL (ref 31.4–37.4)
MCV RBC AUTO: 77 FL (ref 82–98)
MONOCYTES # BLD AUTO: 0.76 THOUSAND/ΜL (ref 0.17–1.22)
MONOCYTES NFR BLD AUTO: 8 % (ref 4–12)
NEUTROPHILS # BLD AUTO: 6.15 THOUSANDS/ΜL (ref 1.85–7.62)
NEUTS SEG NFR BLD AUTO: 62 % (ref 43–75)
NITRITE UR QL STRIP: NEGATIVE
NRBC BLD AUTO-RTO: 0 /100 WBCS
NT-PROBNP SERPL-MCNC: 55 PG/ML
O2 CT BLDV-SCNC: 15.1 ML/DL
PCO2 BLDV: 39.1 MM HG (ref 42–50)
PH BLDV: 7.42 [PH] (ref 7.3–7.4)
PH UR STRIP.AUTO: 7.5 [PH] (ref 4.5–8)
PLATELET # BLD AUTO: 342 THOUSANDS/UL (ref 149–390)
PMV BLD AUTO: 9.7 FL (ref 8.9–12.7)
PO2 BLDV: 67.5 MM HG (ref 35–45)
POTASSIUM SERPL-SCNC: 3.8 MMOL/L (ref 3.5–5.3)
PROCALCITONIN SERPL-MCNC: <0.05 NG/ML
PROT SERPL-MCNC: 6.9 G/DL (ref 6.4–8.2)
PROT UR STRIP-MCNC: NEGATIVE MG/DL
PROTHROMBIN TIME: 12.5 SECONDS (ref 11.8–14.2)
RBC # BLD AUTO: 4.65 MILLION/UL (ref 3.81–5.12)
SODIUM SERPL-SCNC: 139 MMOL/L (ref 136–145)
SP GR UR STRIP.AUTO: 1.01 (ref 1–1.03)
TROPONIN I SERPL-MCNC: <0.02 NG/ML
UROBILINOGEN UR QL STRIP.AUTO: 0.2 E.U./DL
WBC # BLD AUTO: 9.99 THOUSAND/UL (ref 4.31–10.16)

## 2018-12-07 PROCEDURE — 83690 ASSAY OF LIPASE: CPT | Performed by: EMERGENCY MEDICINE

## 2018-12-07 PROCEDURE — 81003 URINALYSIS AUTO W/O SCOPE: CPT | Performed by: EMERGENCY MEDICINE

## 2018-12-07 PROCEDURE — 84484 ASSAY OF TROPONIN QUANT: CPT | Performed by: EMERGENCY MEDICINE

## 2018-12-07 PROCEDURE — 85730 THROMBOPLASTIN TIME PARTIAL: CPT | Performed by: EMERGENCY MEDICINE

## 2018-12-07 PROCEDURE — 80053 COMPREHEN METABOLIC PANEL: CPT | Performed by: EMERGENCY MEDICINE

## 2018-12-07 PROCEDURE — 71046 X-RAY EXAM CHEST 2 VIEWS: CPT

## 2018-12-07 PROCEDURE — 83605 ASSAY OF LACTIC ACID: CPT | Performed by: EMERGENCY MEDICINE

## 2018-12-07 PROCEDURE — 96360 HYDRATION IV INFUSION INIT: CPT

## 2018-12-07 PROCEDURE — 87040 BLOOD CULTURE FOR BACTERIA: CPT | Performed by: EMERGENCY MEDICINE

## 2018-12-07 PROCEDURE — 82805 BLOOD GASES W/O2 SATURATION: CPT | Performed by: EMERGENCY MEDICINE

## 2018-12-07 PROCEDURE — 85610 PROTHROMBIN TIME: CPT | Performed by: EMERGENCY MEDICINE

## 2018-12-07 PROCEDURE — 93005 ELECTROCARDIOGRAM TRACING: CPT

## 2018-12-07 PROCEDURE — 36415 COLL VENOUS BLD VENIPUNCTURE: CPT | Performed by: EMERGENCY MEDICINE

## 2018-12-07 PROCEDURE — 85025 COMPLETE CBC W/AUTO DIFF WBC: CPT | Performed by: EMERGENCY MEDICINE

## 2018-12-07 PROCEDURE — 84145 PROCALCITONIN (PCT): CPT | Performed by: EMERGENCY MEDICINE

## 2018-12-07 PROCEDURE — 99284 EMERGENCY DEPT VISIT MOD MDM: CPT

## 2018-12-07 PROCEDURE — 83880 ASSAY OF NATRIURETIC PEPTIDE: CPT | Performed by: EMERGENCY MEDICINE

## 2018-12-07 PROCEDURE — 83735 ASSAY OF MAGNESIUM: CPT | Performed by: EMERGENCY MEDICINE

## 2018-12-07 RX ORDER — 0.9 % SODIUM CHLORIDE 0.9 %
3 VIAL (ML) INJECTION AS NEEDED
Status: DISCONTINUED | OUTPATIENT
Start: 2018-12-07 | End: 2018-12-07 | Stop reason: HOSPADM

## 2018-12-07 RX ADMIN — SODIUM CHLORIDE 1000 ML: 0.9 INJECTION, SOLUTION INTRAVENOUS at 16:09

## 2018-12-07 NOTE — DISCHARGE INSTRUCTIONS
Dizziness, Ambulatory Care   GENERAL INFORMATION:   Dizziness  is a term used to describe a feeling of being off balance or unsteady  Common causes of dizziness are an inner ear fluid imbalance or a lack of oxygen in your blood  Your dizziness may be acute (lasts 3 days or less) or chronic (lasts longer than 3 days)  You may have dizzy spells that last from seconds to a few hours  Common symptoms related to dizziness include the following:   · A feeling that your surroundings are moving even though you are standing still    · Ringing in your ears or hearing loss     · Feeling faint or lightheaded     · Weakness or unsteadiness     · Double vision or eye movements you cannot control    · Nausea or vomiting     · Confusion  Seek immediate care for the following symptoms:   · You have a headache that does not go away with medicine  · You have shaking chills and a fever  · You vomit over and over with no relief  · Your vomit or bowel movements are red or black  · You have pain in your chest, back, or abdomen  · You have numbness, especially in your face, arms, or legs  · You have trouble moving your arms or legs  Treatment for dizziness  depends on the cause of your dizziness  You may need medicines to decrease your dizziness or nausea  You may need to be admitted to the hospital for treatment  Manage your symptoms:  Get up slowly from sitting or lying down  Do not drive or operate machinery when you are dizzy  Follow up with your healthcare provider as directed:  Write down your questions so you remember to ask them during your visits  CARE AGREEMENT:   You have the right to help plan your care  Learn about your health condition and how it may be treated  Discuss treatment options with your caregivers to decide what care you want to receive  You always have the right to refuse treatment  The above information is an  only   It is not intended as medical advice for individual conditions or treatments  Talk to your doctor, nurse or pharmacist before following any medical regimen to see if it is safe and effective for you  © 2014 [address] is for End User's use only and may not be sold, redistributed or otherwise used for commercial purposes  All illustrations and images included in CareNotes® are the copyrighted property of A D A M , Inc  or Scar Barraza

## 2018-12-08 LAB
ATRIAL RATE: 81 BPM
P AXIS: 63 DEGREES
PR INTERVAL: 142 MS
QRS AXIS: 88 DEGREES
QRSD INTERVAL: 72 MS
QT INTERVAL: 402 MS
QTC INTERVAL: 466 MS
T WAVE AXIS: 57 DEGREES
VENTRICULAR RATE: 81 BPM

## 2018-12-08 PROCEDURE — 93010 ELECTROCARDIOGRAM REPORT: CPT | Performed by: INTERNAL MEDICINE

## 2018-12-08 NOTE — ED PROVIDER NOTES
History  Chief Complaint   Patient presents with    Dizziness     Patient presents from homeless shelter where she states she has been feeling dizzy, and has had diarrhea and chills x2 days  78-year-old female patient with extensive medical history presents emergency department for evaluation dizziness she describes lightheadedness  Patient states feeling generally unwell for the last several days but has no focal symptoms  The patient is lying in bed, in no apparent distress, speaking in full sentences, not working to breathe, states she has a history of the same and was just concerned so she came in for evaluation  The patient does state some diarrhea as well  He states he is currently staying in a homeless shelter and was concerned that she may have picked something up  Patient is sleeping comfortably after exam, when I went in to discuss the results with her she stated that she is amenable to discharge home and had no questions  History provided by:  Patient   used: No    Dizziness   Quality:  Lightheadedness  Severity:  Mild  Onset quality:  Gradual  Timing:  Constant  Progression:  Worsening  Chronicity:  New  Context: not when bending over, not with eye movement, not with inactivity, not with loss of consciousness and not when urinating    Relieved by:  Nothing  Worsened by:  Nothing  Ineffective treatments:  None tried  Associated symptoms: no diarrhea, no headaches, no palpitations and no tinnitus    Risk factors: no heart disease, no hx of vertigo and no new medications        Prior to Admission Medications   Prescriptions Last Dose Informant Patient Reported? Taking?    PARoxetine (PAXIL) 20 mg tablet   No No   Sig: Take 1 tablet (20 mg total) by mouth daily   QUEtiapine (SEROquel) 50 mg tablet   No No   Sig: Take 1 tablet (50 mg total) by mouth 2 (two) times a day   aspirin 325 mg tablet   No No   Sig: Take 1 tablet (325 mg total) by mouth daily   atorvastatin (LIPITOR) 40 mg tablet   No No   Sig: Take 1 tablet (40 mg total) by mouth every evening   divalproex sodium (DEPAKOTE) 500 mg EC tablet   No No   Sig: Take 1 tablet (500 mg total) by mouth 2 (two) times a day   doxepin (SINEquan) 25 mg capsule   No No   Sig: Take 1 capsule (25 mg total) by mouth daily at bedtime   gabapentin (NEURONTIN) 300 mg capsule   No No   Sig: Take 1 capsule (300 mg total) by mouth 3 (three) times a day   levothyroxine 137 mcg tablet   No No   Sig: Take 1 tablet (137 mcg total) by mouth daily in the early morning   melatonin 3 mg   No No   Sig: Take 2 tablets (6 mg total) by mouth daily at bedtime as needed (insomnia)   potassium chloride (K-DUR,KLOR-CON) 10 mEq tablet   No No   Sig: Take 1 tablet (10 mEq total) by mouth 2 (two) times a day      Facility-Administered Medications: None       Past Medical History:   Diagnosis Date    Disease of thyroid gland     History of CVA (cerebrovascular accident)     Hypertension     Moyamoya disease     Psychiatric disorder     [facility] )     Tobacco use        Past Surgical History:   Procedure Laterality Date    BRAIN SURGERY      BRAIN SURGERY  04/10/2017    moyamoya       Family History   Problem Relation Age of Onset    Depression Mother     Heart disease Father     Hypertension Father     Diabetes Father     Breast cancer Maternal Grandmother      I have reviewed and agree with the history as documented  Social History   Substance Use Topics    Smoking status: Current Some Day Smoker     Packs/day: 0 50     Types: Cigarettes    Smokeless tobacco: Never Used    Alcohol use No        Review of Systems   HENT: Negative for tinnitus  Cardiovascular: Negative for palpitations  Gastrointestinal: Negative for diarrhea  Neurological: Positive for dizziness  Negative for headaches  All other systems reviewed and are negative  Physical Exam  Physical Exam   Constitutional: She is oriented to person, place, and time   She appears well-developed and well-nourished  HENT:   Head: Normocephalic and atraumatic  Right Ear: External ear normal    Left Ear: External ear normal    Eyes: Conjunctivae and EOM are normal    Neck: No JVD present  No tracheal deviation present  No thyromegaly present  Cardiovascular: Normal rate  Pulmonary/Chest: Effort normal and breath sounds normal  No stridor  Abdominal: Soft  She exhibits no distension and no mass  There is no tenderness  There is no guarding  No hernia  Musculoskeletal: Normal range of motion  She exhibits no edema, tenderness or deformity  Lymphadenopathy:     She has no cervical adenopathy  Neurological: She is alert and oriented to person, place, and time  Skin: Skin is warm  No rash noted  No erythema  No pallor  Psychiatric: She has a normal mood and affect  Her behavior is normal    Nursing note and vitals reviewed        Vital Signs  ED Triage Vitals   Temperature Pulse Respirations Blood Pressure SpO2   12/07/18 1321 12/07/18 1321 12/07/18 1321 12/07/18 1321 12/07/18 1321   98 9 °F (37 2 °C) 79 18 140/78 99 %      Temp Source Heart Rate Source Patient Position - Orthostatic VS BP Location FiO2 (%)   12/07/18 1321 12/07/18 1321 12/07/18 1321 12/07/18 1321 --   Oral Monitor Lying Right arm       Pain Score       12/07/18 1324       5           Vitals:    12/07/18 1321 12/07/18 1609 12/07/18 1646   BP: 140/78 126/70 119/64   Pulse: 79 87 83   Patient Position - Orthostatic VS: Lying         Visual Acuity      ED Medications  Medications   sodium chloride 0 9 % bolus 1,000 mL (0 mL Intravenous Stopped 12/7/18 1646)       Diagnostic Studies  Results Reviewed     Procedure Component Value Units Date/Time    Procalcitonin [[id number]]  (Normal) Collected:  12/07/18 1430    Lab Status:  Final result Specimen:  Blood from Arm, Left Updated:  12/07/18 1928     Procalcitonin <0 05 ng/ml     Urinalysis with culture and sensitivity reflex [[id number]] Collected:  12/07/18 1606 Lab Status:  Final result Specimen:  Urine from Urine, Clean Catch Updated:  12/07/18 1617     Color, UA Yellow     Clarity, UA Clear     Specific Gravity, UA 1 010     pH, UA 7 5     Leukocytes, UA Negative     Nitrite, UA Negative     Protein, UA Negative mg/dl      Glucose, UA Negative mg/dl      Ketones, UA Negative mg/dl      Urobilinogen, UA 0 2 E U /dl      Bilirubin, UA Negative     Blood, UA Negative    B-type natriuretic peptide [[id number]]  (Normal) Collected:  12/07/18 1433    Lab Status:  Final result Specimen:  Blood from Arm, Left Updated:  12/07/18 1508     NT-proBNP 55 pg/mL     Lipase [[id number]]  (Normal) Collected:  12/07/18 1433    Lab Status:  Final result Specimen:  Blood from Arm, Left Updated:  12/07/18 1508     Lipase 164 u/L     Magnesium [[id number]]  (Normal) Collected:  12/07/18 1433    Lab Status:  Final result Specimen:  Blood from Arm, Left Updated:  12/07/18 1508     Magnesium 1 9 mg/dL     Lactate Blood [[id number]]  (Normal) Collected:  12/07/18 1429    Lab Status:  Final result Specimen:  Blood from Arm, Left Updated:  12/07/18 1507     LACTIC ACID 1 7 mmol/L     Narrative:         Result may be elevated if tourniquet was used during collection      Troponin I [[id number]]  (Normal) Collected:  12/07/18 1430    Lab Status:  Final result Specimen:  Blood from Arm, Left Updated:  12/07/18 1507     Troponin I <0 02 ng/mL     Comprehensive metabolic panel [[id number]]  (Abnormal) Collected:  12/07/18 1433    Lab Status:  Final result Specimen:  Blood from Arm, Left Updated:  12/07/18 1502     Sodium 139 mmol/L      Potassium 3 8 mmol/L      Chloride 103 mmol/L      CO2 26 mmol/L      ANION GAP 10 mmol/L      BUN 8 mg/dL      Creatinine 0 64 mg/dL      Glucose 109 mg/dL      Calcium 8 7 mg/dL      AST 10 U/L      ALT 23 U/L      Alkaline Phosphatase 88 U/L      Total Protein 6 9 g/dL      Albumin 3 4 (L) g/dL      Total Bilirubin 0 20 mg/dL      eGFR 112 ml/min/1 73sq m     Narrative: National Kidney Disease Education Program recommendations are as follows:  GFR calculation is accurate only with a steady state creatinine  Chronic Kidney disease less than 60 ml/min/1 73 sq  meters  Kidney failure less than 15 ml/min/1 73 sq  meters  Protime-INR [[id number]]  (Normal) Collected:  12/07/18 1430    Lab Status:  Final result Specimen:  Blood from Arm, Left Updated:  12/07/18 1456     Protime 12 5 seconds      INR 0 94    APTT [[id number]]  (Normal) Collected:  12/07/18 1430    Lab Status:  Final result Specimen:  Blood from Arm, Left Updated:  12/07/18 1456     PTT 27 seconds     Blood gas, venous [[id number]]  (Abnormal) Collected:  12/07/18 1429    Lab Status:  Final result Specimen:  Blood from Arm, Left Updated:  12/07/18 1451     pH, Redd 7 424 (H)     pCO2, Redd 39 1 (L) mm Hg      pO2, Redd 67 5 (H) mm Hg      HCO3, Redd 25 0 mmol/L      Base Excess, Redd 0 7 mmol/L      O2 Content, Redd 15 1 ml/dL      O2 HGB, VENOUS 88 4 (H) %     Blood culture [[id number]] Collected:  12/07/18 1430    Lab Status:   In process Specimen:  Blood from Arm, Left Updated:  12/07/18 1445    CBC and differential [[id number]]  (Abnormal) Collected:  12/07/18 1429    Lab Status:  Final result Specimen:  Blood from Arm, Left Updated:  12/07/18 1442     WBC 9 99 Thousand/uL      RBC 4 65 Million/uL      Hemoglobin 11 0 (L) g/dL      Hematocrit 35 7 %      MCV 77 (L) fL      MCH 23 7 (L) pg      MCHC 30 8 (L) g/dL      RDW 18 6 (H) %      MPV 9 7 fL      Platelets 676 Thousands/uL      nRBC 0 /100 WBCs      Neutrophils Relative 62 %      Immat GRANS % 0 %      Lymphocytes Relative 28 %      Monocytes Relative 8 %      Eosinophils Relative 2 %      Basophils Relative 0 %      Neutrophils Absolute 6 15 Thousands/µL      Immature Grans Absolute 0 03 Thousand/uL      Lymphocytes Absolute 2 83 Thousands/µL      Monocytes Absolute 0 76 Thousand/µL      Eosinophils Absolute 0 18 Thousand/µL      Basophils Absolute 0 04 Thousands/µL Blood culture [[id number]] Collected:  12/07/18 1430    Lab Status: In process Specimen:  Blood from Arm, Left Updated:  12/07/18 1439                 XR chest 2 views   Final Result by Jose Antonio Castañeda MD (12/07 1441)      No acute cardiopulmonary disease  Workstation performed: [id number]                    Procedures  Procedures       Phone Contacts  ED Phone Contact    ED Course                               MDM  Number of Diagnoses or Management Options  Dizziness: new and requires workup     Amount and/or Complexity of Data Reviewed  Clinical lab tests: ordered and reviewed  Tests in the radiology section of CPT®: reviewed and ordered  Decide to obtain previous medical records or to obtain history from someone other than the patient: yes  Review and summarize past medical records: yes    Patient Progress  Patient progress: stable    CritCare Time    Disposition  Final diagnoses:   Dizziness     Time reflects when diagnosis was documented in both MDM as applicable and the Disposition within this note     Time User Action Codes Description Comment    12/7/2018  4:40 PM Bernarda Renteria Add [R42] Dizziness       ED Disposition     ED Disposition Condition Comment    Discharge  Torrey William discharge to home/self care      Condition at discharge: Good        Follow-up Information     Follow up With Specialties Details Why [address] [place] [address] Petra Ramos MD Family Medicine   [address] [place] [address] 16  [phone]            Discharge Medication List as of 12/7/2018  4:40 PM      CONTINUE these medications which have NOT CHANGED    Details   aspirin 325 mg tablet Take 1 tablet (325 mg total) by mouth daily, Starting Fri 10/12/2018, Print      atorvastatin (LIPITOR) 40 mg tablet Take 1 tablet (40 mg total) by mouth every evening, Starting Mon 8/27/2018, Normal      divalproex sodium (DEPAKOTE) 500 mg EC tablet Take 1 tablet (500 mg total) by mouth 2 (two) times a day, Starting Thu 10/11/2018, Print      doxepin (SINEquan) 25 mg capsule Take 1 capsule (25 mg total) by mouth daily at bedtime, Starting Thu 10/11/2018, Print      gabapentin (NEURONTIN) 300 mg capsule Take 1 capsule (300 mg total) by mouth 3 (three) times a day, Starting Thu 10/11/2018, Print      levothyroxine 137 mcg tablet Take 1 tablet (137 mcg total) by mouth daily in the early morning, Starting Fri 10/12/2018, Print      melatonin 3 mg Take 2 tablets (6 mg total) by mouth daily at bedtime as needed (insomnia), Starting Thu 10/11/2018, Print      PARoxetine (PAXIL) 20 mg tablet Take 1 tablet (20 mg total) by mouth daily, Starting Fri 10/12/2018, Print      potassium chloride (K-DUR,KLOR-CON) 10 mEq tablet Take 1 tablet (10 mEq total) by mouth 2 (two) times a day, Starting Tue 7/17/2018, Normal      QUEtiapine (SEROquel) 50 mg tablet Take 1 tablet (50 mg total) by mouth 2 (two) times a day, Starting Thu 10/11/2018, Print           No discharge procedures on file      ED Provider  Electronically Signed by           Ana Luisa Maxwell DO  12/08/18 4797

## 2018-12-12 LAB
BACTERIA BLD CULT: NORMAL
BACTERIA BLD CULT: NORMAL

## 2019-08-14 NOTE — CONSULTS
Consultation - Cardiology    Junie Swain 34 y o  female MRN: [id number]  Unit/Bed#: -01 Encounter: [id number]    Physician Requesting Consult: Radha Russell MD    Reason for Consult / Principal Problem: syncope    HPI: Junie Swain is a 34y o  year old female with a past medical history significant for Cristino Living disease, CVA, tobacco abuse  Patient presented after syncopal episode  Patient states that in the last 24 hr she has had 3 syncopal events  She reports minimal prodrome  During 1 episode she injured the bridge of her nose and her knee  She relates at during at least 1 episode she was going from seated to standing position  She denies confusion following the events  She otherwise denies chest pain, shortness of breath, palpitations  She states that in the past she has been lightheaded when her blood pressure is low  She denies other personal history of cardiac disease including CAD/MI, arrhythmia, structural heart disease/valvular disease, but does relate that her father had MI in his 52's   She does reports daily use of aspirin    Historical Information   Past Medical History:   Diagnosis Date    History of CVA (cerebrovascular accident)     Moyamoya disease     Tobacco use      Past Surgical History:   Procedure Laterality Date    BRAIN SURGERY      BRAIN SURGERY  04/10/2017    moyamoya     History   Alcohol Use No     History   Drug Use No     History   Smoking Status    Current Some Day Smoker    Packs/day: 0 50    Types: Cigarettes   Smokeless Tobacco    Never Used       FAMILY HISTORY:  Family History   Problem Relation Age of Onset    Depression Mother     Heart disease Father     Hypertension Father     Diabetes Father     Breast cancer Maternal Grandmother        MEDS & ALLERGIES:  all current active meds have been reviewed and current meds:   Current Facility-Administered Medications   Medication Dose Route Frequency    acetaminophen (TYLENOL) tablet 650 mg  650 mg Oral Q4H PRN    aspirin chewable tablet 81 mg  81 mg Oral Daily    atorvastatin (LIPITOR) tablet 40 mg  40 mg Oral QPM    doxepin (SINEquan) capsule 25 mg  25 mg Oral HS    gabapentin (NEURONTIN) capsule 300 mg  300 mg Oral TID    levothyroxine tablet 137 mcg  137 mcg Oral Daily    nicotine (NICODERM CQ) 14 mg/24hr TD 24 hr patch 1 patch  1 patch Transdermal Daily    PARoxetine (PAXIL) tablet 20 mg  20 mg Oral Daily    potassium chloride (K-DUR,KLOR-CON) CR tablet 10 mEq  10 mEq Oral BID        Allergies   Allergen Reactions    Penicillin G Hives         REVIEW OF SYSTEMS:  Constitutional: Denies fever or chills  Eyes: Denies visual disturbance change in visual acuity   HENT: Denies nasal congestion or sore throat   Respiratory: Denies cough, expectoration or shortness of breath   Cardiovascular: Denies chest pain, palpitations or lower extremity swelling   GI: Denies abdominal pain, nausea, vomiting, bloody stools or diarrhea   : Denies dysuria, frequency, hematuria  Musculoskeletal: +knee pain   Neurologic: +syncope Denies headache, focal weakness or sensory changes   Psychiatric: Denies depression, anxiety or panic       PHYSICAL EXAM:  Vitals:   Vitals:    07/16/18 1000   BP: 133/67   Pulse: 74   Resp: 18   Temp: 98 °F (36 7 °C)   SpO2: 96%     There is no height or weight on file to calculate BMI  Systolic (14TZH), AWR:214 , Min:111 , JML:887     Diastolic (54RGY), JUAREZ:55, Min:59, Max:88    No intake or output data in the 24 hours ending 07/16/18 1107  Weight (last 2 days)     None        Invasive Devices     Peripheral Intravenous Line            Peripheral IV 07/16/18 Right Antecubital less than 1 day                General: Patient is not in acute distress  Laying comfortably in the bed  Head: Normocephalic  Atraumatic  HEENT: Both pupils normal sized, round and reactive to light  Nonicteric  Neck: JVP not elevated   Trachea central    Respiratory: Bilateral bronchovascular breath sounds with no crackles or rhonchi  Cardiovascular: RRR  S1 and S2 normal  No murmur, rub or gallop  GI: Abdomen soft, nontender  No guarding or rigidity  Neurologic: Patient is awake, alert, responding to commands  Well-oriented to time, place and person   Moving all extremities  Extremities: No clubbing, cyanosis or edema  Integument: No skin rashes or ulceration      LABORATORY RESULTS:    Results from last 7 days  Lab Units 07/16/18  0340   TROPONIN I ng/mL <0 02       CBC with diff:   Results from last 7 days  Lab Units 07/16/18  0340   WBC Thousand/uL 11 73*   HEMOGLOBIN g/dL 10 5*   HEMATOCRIT % 33 6*   MCV fL 83   PLATELETS Thousands/uL 327   MCH pg 25 8*   MCHC g/dL 31 3*   RDW % 14 5   MPV fL 10 3   NRBC AUTO /100 WBCs 0       CMP:  Results from last 7 days  Lab Units 07/16/18  0340   SODIUM mmol/L 138   POTASSIUM mmol/L 3 9   CHLORIDE mmol/L 101   CO2 mmol/L 29   ANION GAP mmol/L 8   BUN mg/dL 8   CREATININE mg/dL 0 71   GLUCOSE RANDOM mg/dL 108   CALCIUM mg/dL 9 2   EGFR ml/min/1 73sq m 107       BMP:  Results from last 7 days  Lab Units 07/16/18  0340   SODIUM mmol/L 138   POTASSIUM mmol/L 3 9   CHLORIDE mmol/L 101   CO2 mmol/L 29   BUN mg/dL 8   CREATININE mg/dL 0 71   GLUCOSE RANDOM mg/dL 108   CALCIUM mg/dL 9 2                              Lipid Profile:   Lab Results   Component Value Date    CHOL 253 (H) 10/08/2017     Lab Results   Component Value Date    HDL 26 (L) 10/08/2017     Lab Results   Component Value Date    LDLCALC 186 (H) 10/08/2017     Lab Results   Component Value Date    TRIG 206 (H) 10/08/2017       Cardiac testing:   Results for orders placed during the hospital encounter of 10/07/17   Echo complete with contrast if indicated    Narrative [address] [place] [address] [place], [place] [address] [phone]    Transthoracic Echocardiogram  2D, M-mode, Doppler, and Color Doppler    Study date:  08-Oct-2017    Patient: Jefferson Burgess  MR number: [id number]  Account number: [de-identified]  : 1980  Age: 42 years  Gender: Female  Status: Inpatient  Location: Bedside  Height: 61 in  Weight: 185 lb  BP: 133/ 78 mmHg    Indications: TIA, Evaluate for suspected cardiac source of embolism  Assess left ventricular function  Diagnoses: G45 9 - Transient cerebral ischemic attack, unspecified    Sonographer:   [facility]  Interpreting Physician:  Td Montague MD  Referring Physician:  Lianne Wilburn PA-C  Group:  [facility]    SUMMARY    LEFT VENTRICLE:  Systolic function was normal  Ejection fraction was estimated to be 60 %  There were no regional wall motion abnormalities  Left ventricular diastolic function parameters were normal     RIGHT VENTRICLE:  The size was normal   Systolic function was normal     MITRAL VALVE:  There was trace regurgitation  AORTIC VALVE:  There was trace regurgitation  TRICUSPID VALVE:  There was trace regurgitation  Pulmonary artery systolic pressure was within the normal range  HISTORY: Consistent with transient ischemic attack or stroke  PRIOR HISTORY: left sided numbness, Risk factors: a history of current cigarette use (within the last month)  PROCEDURE: The procedure was performed at the bedside  This was a routine study  The transthoracic approach was used  The study included complete 2D imaging, M-mode, complete spectral Doppler, and color Doppler  The heart rate was 69 bpm,  at the start of the study  Images were obtained from the parasternal, apical, subcostal, and suprasternal notch acoustic windows  Image quality was adequate  LEFT VENTRICLE: Size was normal  Systolic function was normal  Ejection fraction was estimated to be 60 %  There were no regional wall motion abnormalities  Wall thickness was normal  No evidence of apical thrombus   DOPPLER: Left  ventricular diastolic function parameters were normal     RIGHT VENTRICLE: The size was normal  Systolic function was normal  Wall thickness was normal     LEFT ATRIUM: Size was normal     RIGHT ATRIUM: Size was normal     MITRAL VALVE: Valve structure was normal  There was normal leaflet separation  DOPPLER: The transmitral velocity was within the normal range  There was no evidence for stenosis  There was trace regurgitation  AORTIC VALVE: The valve was trileaflet  Leaflets exhibited normal thickness and normal cuspal separation  DOPPLER: Transaortic velocity was within the normal range  There was no evidence for stenosis  There was trace regurgitation  TRICUSPID VALVE: The valve structure was normal  There was normal leaflet separation  DOPPLER: The transtricuspid velocity was within the normal range  There was no evidence for stenosis  There was trace regurgitation  Pulmonary artery  systolic pressure was within the normal range  PULMONIC VALVE: Leaflets exhibited normal thickness, no calcification, and normal cuspal separation  DOPPLER: The transpulmonic velocity was within the normal range  There was no significant regurgitation  PERICARDIUM: There was no pericardial effusion  The pericardium was normal in appearance  AORTA: The root exhibited normal size  SYSTEMIC VEINS: IVC: The inferior vena cava was normal in size   Respirophasic changes were normal     SYSTEM MEASUREMENT TABLES    2D  %FS: 30 7 %  Ao Diam: 2 6 cm  EDV(Teich): 58 3 ml  EF(Teich): 59 2 %  ESV(Teich): 23 8 ml  IVSd: 1 cm  LA Area: 13 3 cm2  LA Diam: 3 1 cm  LVEDV MOD A4C: 98 3 ml  LVEF MOD A4C: 71 4 %  LVESV MOD A4C: 28 1 ml  LVIDd: 3 7 cm  LVIDs: 2 6 cm  LVLd A4C: 8 9 cm  LVLs A4C: 6 7 cm  LVPWd: 0 6 cm  RA Area: 11 1 cm2  RVIDd: 2 7 cm  SV MOD A4C: 70 2 ml  SV(Teich): 34 5 ml    CW  AR Dec Blaine: 2 3 m/s2  AR Dec Time: 1913 6 ms  AR PHT: 555 ms  AR Vmax: 4 4 m/s  AR maxP 9 mmHg  TR Vmax: 2 4 m/s  TR maxP 5 mmHg    MM  TAPSE: 2 1 cm    PW  E': 0 1 m/s  E/E': 6 9  MV A Chai: 0 7 m/s  MV Dec Blaine: 4 m/s2  MV DecT: 209 4 ms  MV E Chai: 0 8 m/s  MV E/A Ratio: 1 2  MV PHT: 60 7 ms  MVA By PHT: 3 6 cm2    [employer] Accredited Echocardiography Laboratory    Prepared and electronically signed by    Spring Fitch MD  Signed 08-Oct-2017 16:21:40         Imaging: I have personally reviewed pertinent reports  EKG reviewed personally: pending, previous EKG from 06/2018 NSR with right atrial enlargement, rightward axis     Telemetry reviewed personally: no events       Assessment and Plan:  Syncope  -reports x 3 episodes  -at least one episode was related to positional changes   -she does admit to having difficulty staying hydrated due to being homeless-- will obtain orthostatics  -previous echo in 2017 was unremarkable, however will repeat to evaluate for any acute change in EF   -no events on telemetry, will monitor   -await neuro consult-- pt has history of moyamyoa disease     Code Status: Level 1 - Full Code      Thank you for allowing us to participate in this patient's care  Counseling / Coordination of Care  Total floor / unit time spent today 35 minutes  Greater than 50% of total time was spent with the patient and / or family counseling and / or coordination of care  A description of the counseling / coordination of care: Review of history, current assessment, development of a plan         Dia Fleming PA-C  7/16/2018,11:07 AM 0 = independent

## 2021-01-03 NOTE — ED NOTES
Belongings returned to pt however, she has not left the unit  She continues to sit on the bed and seems like she does not want to leave        Landen Stoll  09/19/18 2007 Patient requests all Lab and Radiology Results on their Discharge Instructions

## 2022-08-30 ENCOUNTER — HOSPITAL ENCOUNTER (OUTPATIENT)
Facility: HOSPITAL | Age: 42
Setting detail: OBSERVATION
Discharge: HOME HEALTH | End: 2022-09-01
Attending: EMERGENCY MEDICINE | Admitting: INTERNAL MEDICINE
Payer: COMMERCIAL

## 2022-08-30 ENCOUNTER — APPOINTMENT (EMERGENCY)
Dept: CT IMAGING | Facility: HOSPITAL | Age: 42
End: 2022-08-30
Payer: COMMERCIAL

## 2022-08-30 DIAGNOSIS — R42 DIZZINESS: ICD-10-CM

## 2022-08-30 DIAGNOSIS — I63.9 ACUTE CVA (CEREBROVASCULAR ACCIDENT) (HCC): ICD-10-CM

## 2022-08-30 DIAGNOSIS — Z86.73 HISTORY OF CVA (CEREBROVASCULAR ACCIDENT): Primary | ICD-10-CM

## 2022-08-30 PROCEDURE — 99285 EMERGENCY DEPT VISIT HI MDM: CPT

## 2022-08-30 PROCEDURE — 99291 CRITICAL CARE FIRST HOUR: CPT | Performed by: EMERGENCY MEDICINE

## 2022-08-30 PROCEDURE — 93005 ELECTROCARDIOGRAM TRACING: CPT

## 2022-08-30 PROCEDURE — 70498 CT ANGIOGRAPHY NECK: CPT

## 2022-08-30 PROCEDURE — 70496 CT ANGIOGRAPHY HEAD: CPT

## 2022-08-31 ENCOUNTER — APPOINTMENT (OUTPATIENT)
Dept: RADIOLOGY | Facility: HOSPITAL | Age: 42
End: 2022-08-31
Payer: COMMERCIAL

## 2022-08-31 ENCOUNTER — APPOINTMENT (OUTPATIENT)
Dept: MRI IMAGING | Facility: HOSPITAL | Age: 42
End: 2022-08-31
Payer: COMMERCIAL

## 2022-08-31 PROBLEM — Z72.89 ALCOHOL USE: Status: ACTIVE | Noted: 2022-08-31

## 2022-08-31 PROBLEM — R93.0 ABNORMAL CT OF THE HEAD: Status: ACTIVE | Noted: 2022-08-31

## 2022-08-31 PROBLEM — E87.6 HYPOKALEMIA: Status: ACTIVE | Noted: 2022-08-31

## 2022-08-31 PROBLEM — Z78.9 ALCOHOL USE: Status: ACTIVE | Noted: 2022-08-31

## 2022-08-31 PROBLEM — E11.9 DIABETES (HCC): Status: ACTIVE | Noted: 2022-08-31

## 2022-08-31 LAB
2HR DELTA HS TROPONIN: -6 NG/L
4HR DELTA HS TROPONIN: -6 NG/L
ALBUMIN SERPL BCP-MCNC: 3.2 G/DL (ref 3.5–5)
ALP SERPL-CCNC: 115 U/L (ref 46–116)
ALT SERPL W P-5'-P-CCNC: 102 U/L (ref 12–78)
ANION GAP SERPL CALCULATED.3IONS-SCNC: 12 MMOL/L (ref 4–13)
ANION GAP SERPL CALCULATED.3IONS-SCNC: 15 MMOL/L (ref 4–13)
APTT PPP: 27 SECONDS (ref 23–37)
AST SERPL W P-5'-P-CCNC: 59 U/L (ref 5–45)
ATRIAL RATE: 103 BPM
BILIRUB SERPL-MCNC: 0.3 MG/DL (ref 0.2–1)
BUN SERPL-MCNC: 16 MG/DL (ref 5–25)
BUN SERPL-MCNC: 17 MG/DL (ref 5–25)
CALCIUM ALBUM COR SERPL-MCNC: 9.6 MG/DL (ref 8.3–10.1)
CALCIUM SERPL-MCNC: 8.3 MG/DL (ref 8.3–10.1)
CALCIUM SERPL-MCNC: 9 MG/DL (ref 8.3–10.1)
CARDIAC TROPONIN I PNL SERPL HS: 36 NG/L
CARDIAC TROPONIN I PNL SERPL HS: 36 NG/L
CARDIAC TROPONIN I PNL SERPL HS: 42 NG/L
CHLORIDE SERPL-SCNC: 102 MMOL/L (ref 96–108)
CHLORIDE SERPL-SCNC: 95 MMOL/L (ref 96–108)
CHOLEST SERPL-MCNC: 269 MG/DL
CO2 SERPL-SCNC: 23 MMOL/L (ref 21–32)
CO2 SERPL-SCNC: 26 MMOL/L (ref 21–32)
CREAT SERPL-MCNC: 0.92 MG/DL (ref 0.6–1.3)
CREAT SERPL-MCNC: 1.01 MG/DL (ref 0.6–1.3)
ERYTHROCYTE [DISTWIDTH] IN BLOOD BY AUTOMATED COUNT: 16.4 % (ref 11.6–15.1)
ERYTHROCYTE [DISTWIDTH] IN BLOOD BY AUTOMATED COUNT: 16.4 % (ref 11.6–15.1)
EST. AVERAGE GLUCOSE BLD GHB EST-MCNC: 169 MG/DL
FLUAV RNA RESP QL NAA+PROBE: NEGATIVE
FLUBV RNA RESP QL NAA+PROBE: NEGATIVE
GFR SERPL CREATININE-BSD FRML MDRD: 67 ML/MIN/1.73SQ M
GFR SERPL CREATININE-BSD FRML MDRD: 75 ML/MIN/1.73SQ M
GLUCOSE SERPL-MCNC: 150 MG/DL (ref 65–140)
GLUCOSE SERPL-MCNC: 162 MG/DL (ref 65–140)
GLUCOSE SERPL-MCNC: 165 MG/DL (ref 65–140)
GLUCOSE SERPL-MCNC: 176 MG/DL (ref 65–140)
GLUCOSE SERPL-MCNC: 182 MG/DL (ref 65–140)
GLUCOSE SERPL-MCNC: 202 MG/DL (ref 65–140)
GLUCOSE SERPL-MCNC: 206 MG/DL (ref 65–140)
GLUCOSE SERPL-MCNC: 245 MG/DL (ref 65–140)
HBA1C MFR BLD: 7.5 %
HCG SERPL QL: NEGATIVE
HCT VFR BLD AUTO: 36.1 % (ref 34.8–46.1)
HCT VFR BLD AUTO: 37.1 % (ref 34.8–46.1)
HDLC SERPL-MCNC: 27 MG/DL
HGB BLD-MCNC: 11.1 G/DL (ref 11.5–15.4)
HGB BLD-MCNC: 11.4 G/DL (ref 11.5–15.4)
INR PPP: 1.01 (ref 0.84–1.19)
LDLC SERPL CALC-MCNC: 178 MG/DL (ref 0–100)
MCH RBC QN AUTO: 23.4 PG (ref 26.8–34.3)
MCH RBC QN AUTO: 23.6 PG (ref 26.8–34.3)
MCHC RBC AUTO-ENTMCNC: 30.7 G/DL (ref 31.4–37.4)
MCHC RBC AUTO-ENTMCNC: 30.7 G/DL (ref 31.4–37.4)
MCV RBC AUTO: 76 FL (ref 82–98)
MCV RBC AUTO: 77 FL (ref 82–98)
P AXIS: 68 DEGREES
PLATELET # BLD AUTO: 349 THOUSANDS/UL (ref 149–390)
PLATELET # BLD AUTO: 386 THOUSANDS/UL (ref 149–390)
PMV BLD AUTO: 10.2 FL (ref 8.9–12.7)
PMV BLD AUTO: 9.4 FL (ref 8.9–12.7)
POTASSIUM SERPL-SCNC: 3.1 MMOL/L (ref 3.5–5.3)
POTASSIUM SERPL-SCNC: 3.3 MMOL/L (ref 3.5–5.3)
PR INTERVAL: 150 MS
PROT SERPL-MCNC: 6.9 G/DL (ref 6.4–8.4)
PROTHROMBIN TIME: 13.1 SECONDS (ref 11.6–14.5)
QRS AXIS: 86 DEGREES
QRSD INTERVAL: 60 MS
QT INTERVAL: 358 MS
QTC INTERVAL: 468 MS
RBC # BLD AUTO: 4.74 MILLION/UL (ref 3.81–5.12)
RBC # BLD AUTO: 4.84 MILLION/UL (ref 3.81–5.12)
RSV RNA RESP QL NAA+PROBE: NEGATIVE
SARS-COV-2 RNA RESP QL NAA+PROBE: NEGATIVE
SODIUM SERPL-SCNC: 133 MMOL/L (ref 135–147)
SODIUM SERPL-SCNC: 140 MMOL/L (ref 135–147)
T WAVE AXIS: 91 DEGREES
TRIGL SERPL-MCNC: 319 MG/DL
VENTRICULAR RATE: 103 BPM
WBC # BLD AUTO: 15.01 THOUSAND/UL (ref 4.31–10.16)
WBC # BLD AUTO: 15.1 THOUSAND/UL (ref 4.31–10.16)

## 2022-08-31 PROCEDURE — 99406 BEHAV CHNG SMOKING 3-10 MIN: CPT | Performed by: INTERNAL MEDICINE

## 2022-08-31 PROCEDURE — 87040 BLOOD CULTURE FOR BACTERIA: CPT | Performed by: INTERNAL MEDICINE

## 2022-08-31 PROCEDURE — 84703 CHORIONIC GONADOTROPIN ASSAY: CPT | Performed by: EMERGENCY MEDICINE

## 2022-08-31 PROCEDURE — 84484 ASSAY OF TROPONIN QUANT: CPT | Performed by: INTERNAL MEDICINE

## 2022-08-31 PROCEDURE — 96360 HYDRATION IV INFUSION INIT: CPT

## 2022-08-31 PROCEDURE — 36415 COLL VENOUS BLD VENIPUNCTURE: CPT | Performed by: EMERGENCY MEDICINE

## 2022-08-31 PROCEDURE — 97163 PT EVAL HIGH COMPLEX 45 MIN: CPT

## 2022-08-31 PROCEDURE — 85610 PROTHROMBIN TIME: CPT | Performed by: EMERGENCY MEDICINE

## 2022-08-31 PROCEDURE — 80048 BASIC METABOLIC PNL TOTAL CA: CPT | Performed by: EMERGENCY MEDICINE

## 2022-08-31 PROCEDURE — 80053 COMPREHEN METABOLIC PANEL: CPT | Performed by: INTERNAL MEDICINE

## 2022-08-31 PROCEDURE — 82948 REAGENT STRIP/BLOOD GLUCOSE: CPT

## 2022-08-31 PROCEDURE — 99204 OFFICE O/P NEW MOD 45 MIN: CPT | Performed by: PSYCHIATRY & NEUROLOGY

## 2022-08-31 PROCEDURE — 99220 PR INITIAL OBSERVATION CARE/DAY 70 MINUTES: CPT | Performed by: INTERNAL MEDICINE

## 2022-08-31 PROCEDURE — 80061 LIPID PANEL: CPT | Performed by: INTERNAL MEDICINE

## 2022-08-31 PROCEDURE — 93010 ELECTROCARDIOGRAM REPORT: CPT | Performed by: INTERNAL MEDICINE

## 2022-08-31 PROCEDURE — 84484 ASSAY OF TROPONIN QUANT: CPT | Performed by: EMERGENCY MEDICINE

## 2022-08-31 PROCEDURE — 0241U HB NFCT DS VIR RESP RNA 4 TRGT: CPT | Performed by: INTERNAL MEDICINE

## 2022-08-31 PROCEDURE — 83036 HEMOGLOBIN GLYCOSYLATED A1C: CPT | Performed by: INTERNAL MEDICINE

## 2022-08-31 PROCEDURE — 85027 COMPLETE CBC AUTOMATED: CPT | Performed by: INTERNAL MEDICINE

## 2022-08-31 PROCEDURE — G1004 CDSM NDSC: HCPCS

## 2022-08-31 PROCEDURE — 71045 X-RAY EXAM CHEST 1 VIEW: CPT

## 2022-08-31 PROCEDURE — 71046 X-RAY EXAM CHEST 2 VIEWS: CPT

## 2022-08-31 PROCEDURE — 97166 OT EVAL MOD COMPLEX 45 MIN: CPT

## 2022-08-31 PROCEDURE — 85027 COMPLETE CBC AUTOMATED: CPT | Performed by: EMERGENCY MEDICINE

## 2022-08-31 PROCEDURE — 70551 MRI BRAIN STEM W/O DYE: CPT

## 2022-08-31 PROCEDURE — 85730 THROMBOPLASTIN TIME PARTIAL: CPT | Performed by: EMERGENCY MEDICINE

## 2022-08-31 RX ORDER — ENOXAPARIN SODIUM 100 MG/ML
40 INJECTION SUBCUTANEOUS DAILY
Status: DISCONTINUED | OUTPATIENT
Start: 2022-08-31 | End: 2022-09-01 | Stop reason: HOSPADM

## 2022-08-31 RX ORDER — CILOSTAZOL 100 MG/1
100 TABLET ORAL
Status: DISCONTINUED | OUTPATIENT
Start: 2022-08-31 | End: 2022-09-01 | Stop reason: HOSPADM

## 2022-08-31 RX ORDER — ACETAMINOPHEN 325 MG/1
650 TABLET ORAL EVERY 6 HOURS PRN
Status: DISCONTINUED | OUTPATIENT
Start: 2022-08-31 | End: 2022-09-01 | Stop reason: HOSPADM

## 2022-08-31 RX ORDER — NICOTINE 21 MG/24HR
1 PATCH, TRANSDERMAL 24 HOURS TRANSDERMAL DAILY
Status: DISCONTINUED | OUTPATIENT
Start: 2022-09-01 | End: 2022-09-01 | Stop reason: HOSPADM

## 2022-08-31 RX ORDER — LANOLIN ALCOHOL/MO/W.PET/CERES
100 CREAM (GRAM) TOPICAL DAILY
Status: DISCONTINUED | OUTPATIENT
Start: 2022-08-31 | End: 2022-09-01 | Stop reason: HOSPADM

## 2022-08-31 RX ORDER — ASPIRIN 325 MG
325 TABLET ORAL DAILY
Status: DISCONTINUED | OUTPATIENT
Start: 2022-08-31 | End: 2022-08-31

## 2022-08-31 RX ORDER — ATORVASTATIN CALCIUM 40 MG/1
40 TABLET, FILM COATED ORAL EVERY EVENING
Status: DISCONTINUED | OUTPATIENT
Start: 2022-08-31 | End: 2022-08-31

## 2022-08-31 RX ORDER — FOLIC ACID 1 MG/1
1 TABLET ORAL DAILY
Status: DISCONTINUED | OUTPATIENT
Start: 2022-08-31 | End: 2022-09-01 | Stop reason: HOSPADM

## 2022-08-31 RX ORDER — ATORVASTATIN CALCIUM 40 MG/1
80 TABLET, FILM COATED ORAL EVERY EVENING
Status: DISCONTINUED | OUTPATIENT
Start: 2022-08-31 | End: 2022-09-01 | Stop reason: HOSPADM

## 2022-08-31 RX ORDER — INSULIN LISPRO 100 [IU]/ML
1-6 INJECTION, SOLUTION INTRAVENOUS; SUBCUTANEOUS
Status: DISCONTINUED | OUTPATIENT
Start: 2022-08-31 | End: 2022-09-01 | Stop reason: HOSPADM

## 2022-08-31 RX ORDER — DICYCLOMINE HYDROCHLORIDE 10 MG/1
10 CAPSULE ORAL ONCE
Status: COMPLETED | OUTPATIENT
Start: 2022-08-31 | End: 2022-08-31

## 2022-08-31 RX ORDER — MECLIZINE HYDROCHLORIDE 25 MG/1
25 TABLET ORAL EVERY 8 HOURS SCHEDULED
Status: DISCONTINUED | OUTPATIENT
Start: 2022-08-31 | End: 2022-09-01 | Stop reason: HOSPADM

## 2022-08-31 RX ORDER — LEVOTHYROXINE SODIUM 0.15 MG/1
150 TABLET ORAL
Status: DISCONTINUED | OUTPATIENT
Start: 2022-08-31 | End: 2022-09-01 | Stop reason: HOSPADM

## 2022-08-31 RX ORDER — INSULIN LISPRO 100 [IU]/ML
1-5 INJECTION, SOLUTION INTRAVENOUS; SUBCUTANEOUS
Status: DISCONTINUED | OUTPATIENT
Start: 2022-08-31 | End: 2022-09-01 | Stop reason: HOSPADM

## 2022-08-31 RX ORDER — MECLIZINE HYDROCHLORIDE 25 MG/1
50 TABLET ORAL ONCE
Status: COMPLETED | OUTPATIENT
Start: 2022-08-31 | End: 2022-08-31

## 2022-08-31 RX ORDER — NICOTINE 21 MG/24HR
21 PATCH, TRANSDERMAL 24 HOURS TRANSDERMAL ONCE
Status: COMPLETED | OUTPATIENT
Start: 2022-08-31 | End: 2022-09-01

## 2022-08-31 RX ORDER — POTASSIUM CHLORIDE 20 MEQ/1
40 TABLET, EXTENDED RELEASE ORAL ONCE
Status: COMPLETED | OUTPATIENT
Start: 2022-08-31 | End: 2022-08-31

## 2022-08-31 RX ORDER — SODIUM CHLORIDE 9 MG/ML
125 INJECTION, SOLUTION INTRAVENOUS CONTINUOUS
Status: DISCONTINUED | OUTPATIENT
Start: 2022-08-31 | End: 2022-09-01

## 2022-08-31 RX ORDER — ASPIRIN 81 MG/1
324 TABLET, CHEWABLE ORAL ONCE
Status: COMPLETED | OUTPATIENT
Start: 2022-08-31 | End: 2022-08-31

## 2022-08-31 RX ORDER — LOPERAMIDE HYDROCHLORIDE 2 MG/1
2 CAPSULE ORAL ONCE
Status: COMPLETED | OUTPATIENT
Start: 2022-08-31 | End: 2022-08-31

## 2022-08-31 RX ORDER — ASPIRIN 81 MG/1
81 TABLET ORAL DAILY
Status: DISCONTINUED | OUTPATIENT
Start: 2022-08-31 | End: 2022-09-01 | Stop reason: HOSPADM

## 2022-08-31 RX ADMIN — MECLIZINE HYDROCHLORIDE 25 MG: 25 TABLET ORAL at 15:22

## 2022-08-31 RX ADMIN — NICOTINE 21 MG: 21 PATCH, EXTENDED RELEASE TRANSDERMAL at 01:27

## 2022-08-31 RX ADMIN — Medication 1 TABLET: at 08:26

## 2022-08-31 RX ADMIN — INSULIN LISPRO 2 UNITS: 100 INJECTION, SOLUTION INTRAVENOUS; SUBCUTANEOUS at 21:19

## 2022-08-31 RX ADMIN — MECLIZINE HYDROCHLORIDE 25 MG: 25 TABLET ORAL at 21:13

## 2022-08-31 RX ADMIN — FOLIC ACID 1 MG: 1 TABLET ORAL at 08:26

## 2022-08-31 RX ADMIN — INSULIN LISPRO 1 UNITS: 100 INJECTION, SOLUTION INTRAVENOUS; SUBCUTANEOUS at 17:30

## 2022-08-31 RX ADMIN — LOPERAMIDE HYDROCHLORIDE 2 MG: 2 CAPSULE ORAL at 05:47

## 2022-08-31 RX ADMIN — INSULIN LISPRO 2 UNITS: 100 INJECTION, SOLUTION INTRAVENOUS; SUBCUTANEOUS at 07:28

## 2022-08-31 RX ADMIN — INSULIN LISPRO 1 UNITS: 100 INJECTION, SOLUTION INTRAVENOUS; SUBCUTANEOUS at 02:14

## 2022-08-31 RX ADMIN — INSULIN LISPRO 1 UNITS: 100 INJECTION, SOLUTION INTRAVENOUS; SUBCUTANEOUS at 11:40

## 2022-08-31 RX ADMIN — THIAMINE HCL TAB 100 MG 100 MG: 100 TAB at 08:26

## 2022-08-31 RX ADMIN — DICYCLOMINE HYDROCHLORIDE 10 MG: 10 CAPSULE ORAL at 05:47

## 2022-08-31 RX ADMIN — ASPIRIN 324 MG: 81 TABLET, CHEWABLE ORAL at 01:26

## 2022-08-31 RX ADMIN — ENOXAPARIN SODIUM 40 MG: 40 INJECTION SUBCUTANEOUS at 08:27

## 2022-08-31 RX ADMIN — SODIUM CHLORIDE 125 ML/HR: 0.9 INJECTION, SOLUTION INTRAVENOUS at 15:22

## 2022-08-31 RX ADMIN — SODIUM CHLORIDE 1000 ML: 0.9 INJECTION, SOLUTION INTRAVENOUS at 00:45

## 2022-08-31 RX ADMIN — ATORVASTATIN CALCIUM 80 MG: 40 TABLET, FILM COATED ORAL at 17:28

## 2022-08-31 RX ADMIN — MECLIZINE HYDROCHLORIDE 50 MG: 25 TABLET ORAL at 00:44

## 2022-08-31 RX ADMIN — POTASSIUM CHLORIDE 40 MEQ: 1500 TABLET, EXTENDED RELEASE ORAL at 02:14

## 2022-08-31 RX ADMIN — ASPIRIN 81 MG: 81 TABLET, COATED ORAL at 08:26

## 2022-08-31 RX ADMIN — LEVOTHYROXINE SODIUM 150 MCG: 150 TABLET ORAL at 06:12

## 2022-08-31 RX ADMIN — CILOSTAZOL 100 MG: 100 TABLET ORAL at 17:28

## 2022-08-31 RX ADMIN — SODIUM CHLORIDE 125 ML/HR: 0.9 INJECTION, SOLUTION INTRAVENOUS at 02:20

## 2022-08-31 RX ADMIN — CILOSTAZOL 100 MG: 100 TABLET ORAL at 07:28

## 2022-08-31 RX ADMIN — IOHEXOL 85 ML: 350 INJECTION, SOLUTION INTRAVENOUS at 00:11

## 2022-08-31 NOTE — ASSESSMENT & PLAN NOTE
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 which demonstrated significant interval enlargement from prior CTA  Patient was not an IV thrombolytics candidate due to symptoms resolving/clearly non disabling  No IR target was identified on CTA head and neck  Patient was admitted on the stroke pathway and it was recommended that Aggrenox be held and patient was loaded with aspirin 325 mg x 1 and and cilostazol 100 mg then continued on aspirin 81 mg daily as well as cilostazol 100 mg BID  Patient was also noted to have leukocytosis on admission  MRI brain without contrast was completed and demonstrates no acute intracranial pathology  Stable chronic bilateral posterior cerebral artery distribution infarcts right greater than left noted  Patient continues to complain of dizzy sensation as well as worsened vision on the left side  Etiology of symptoms unclear, but concern for possible recrudescence of prior stroke symptoms in the setting of possible infection given leukocytosis  Plan:   - Recommend evaluate for underlying infection  Consider check UA, Covid-19/influenza A and B/RSV, blood cultures, CXR    - Given that symptoms are felt not to be secondary to vascular etiology, would recommend she continue on home dose of Aggrenox and can d/c Cilostazol and ASA     - Recommend notify neurology team with any changes in exam    - Recommend outpatient follow-up with her neurologist

## 2022-08-31 NOTE — PLAN OF CARE
Problem: OCCUPATIONAL THERAPY ADULT  Goal: Performs self-care activities at highest level of function for planned discharge setting  See evaluation for individualized goals  Description: Treatment Interventions: Visual perceptual retraining, ADL retraining, Functional transfer training, Patient/family training, Compensatory technique education, Activityengagement, Energy conservation          See flowsheet documentation for full assessment, interventions and recommendations  Note: Limitation: Decreased ADL status, Decreased cognition, Decreased self-care trans  Prognosis: Good  Assessment: Patient is a 38 y o  female seen for OT evaluation s/p admit to [address] [place] [address] on 8/30/2022 w/Dizziness  Commorbidities + PMHx affecting patient's functional performance c ADL tasks at time of assessment include: tobacco use, leukocytosis, hypothyroidism, hypokalemia, alcohol use, diabetes, hx of CVA  OT orders placed for evaluation and treatment to assess pt's ADLs, cognitive status + performance during functional tasks in order to formulate appropriate d/c recommendations  Therapist performed at least two patient identifiers during session including name and wristband  Personal factors affecting patient at time of initial evaluation include: limited caregiver support, inaccesible home environment, steps to enter, difficulty performing ADLs and difficulty performing IADLs, new need for AD and inability to navigate community distances  Pt currently presents c [facility] ability to collaboratively engage in d/c planning   Pt's clinical presentation is currently evolving given new onset deficits that effect pt's occupational performance and ability to safely return to PLOF including decrease activity tolerance, decrease standing balance, decrease sitting balance, decrease performance during ADL tasks, decrease performance during functional transfers, limited family support and high fall risk, combined with medical complications of hypertension , abnormal renal lab values, abnormal CBC, abnormal blood sugars, abnormal potassium values and need for input for mobility technique/safety  Patient to benefit from continued Occupational Therapy treatment while in the hospital to address aforementioned deficits and maximize level of functional independence with ADLs and functional mobility  From OT standpoint, recommendation at time of d/c would be Short Term Rehab       OT Discharge Recommendation: Post acute rehabilitation services

## 2022-08-31 NOTE — H&P
[address] [facility]  H&P- Roanna Ly 1980, 38 y o  female MRN: [id number]  Unit/Bed#: ED 29 Encounter: [id number]  Primary Care Provider: Hortensia Rangel MD   Date and time admitted to hospital: 8/30/2022 11:32 PM    * Dizziness  Assessment & Plan  Patient presenting to the ED for dizziness and worsening peripheral vision deficit  Patient has a known history of moyamoya and multiple prior strokes  Previously was on Plavix, then switch to aspirin, then eventually Aggrenox  Denies any associated facial droop, slurred speech, weakness, numbness/tingling  Stroke alert called in the ED  · CTA head/neck: Redemonstrated chronic extensive changes of underlying moyamoya disease within the cervical and intracranial vasculature and associated secondary collateralization overall similar in appearance to prior study dated 8/24/2018  · CT head:  No acute intracranial abnormality  · ECG: Sinus tachycardia, right atrial enlargement, non-specific ST changes  · Follow stroke pathway  · Starting on daily asa, high dose statin, cilostazole 100mg BID  · Allow for permissive HTN up to SBP <180  · Obtain MRI  · PT/OT eval  · Monitor on telemetry  · Consult to Neurology, recommendations are appreciated    Abnormal CT of the head  Assessment & Plan  · CTA head/neck:  2 basilar tip aneurysms as above, largest projecting posteriorly and measuring approximately 5 mm    These aneurysms were not well visualized on the prior study and demonstrate significant interval enlargement  · Patient will need outpatient follow-up with Neurosurgery    Diabetes [facility])  Assessment & Plan  Lab Results   Component Value Date    HGBA1C 8 3 (H) 02/22/2022       Recent Labs     08/31/22  0020   POCGLU 182*       Blood Sugar Average: Last 72 hrs:  (P) 182     · Holding home metformin during hospitalization  · Start on SSI with accu checks  · Hypoglycemia protocol  · Diabetic diet    Alcohol use  Assessment & Plan  · Upon questioning patient reports drinking a few times a week  Reports that she drinks about 3 alcoholic beverages every couple days  Reports that her last drink was 3 days ago  · Will monitor for signs and symptoms of alcohol withdrawal  · Start on thiamine, folic acid, multivitamin  · Encouraged alcohol cessation    Hypokalemia  Assessment & Plan  · Potassium 3 3 on admission  · Supplementation with 40 mEq oral  · Repeat with morning labs    Hypothyroidism  Assessment & Plan  · Continue levothyroxine    Leukocytosis  Assessment & Plan  · WBC 15 01 on admission  · Afebrile and without acute signs of infection  · Monitor off of antibiotics    Tobacco use  Assessment & Plan  · Current everyday smoker  · Supplementation with nicotine patch  · Encouraged cessation    VTE Prophylaxis: Enoxaparin (Lovenox)  / sequential compression device   Code Status: Level 1 code  Discussion with family:  Update in the morning    Anticipated Length of Stay:  Patient will be admitted on an Observation basis with an anticipated length of stay of  Less than 2 midnights  Justification for Hospital Stay:  Dizziness    Total Time for Visit, including Counseling / Coordination of Care: 60 minutes  Greater than 50% of this total time spent on direct patient counseling and coordination of care  Chief Complaint:   Dizziness    History of Present Illness:    Margarette Grant is a 38 y o  female who has a past medical history significant for hypothyroidism, moyamoya, diabetes, history of CVA, tobacco abuse, alcohol use  Patient presenting to the ED for dizziness and worsening peripheral vision deficit  Patient has a known history of moyamoya and multiple prior strokes  Previously was on Plavix, then switch to aspirin, then eventually Aggrenox  Denies any associated facial droop, slurred speech, weakness, numbness/tingling  Stroke alert called in the ED   patient requiring medical admission for stroke pathway Neurology consultation    All patient questions answered to the best my ability  Review of Systems:    Review of Systems   Constitutional: Negative for chills and fever  HENT: Negative for ear pain and sore throat  Eyes: Positive for visual disturbance  Negative for pain  Respiratory: Negative for cough and shortness of breath  Cardiovascular: Negative for chest pain and palpitations  Gastrointestinal: Negative for abdominal pain and vomiting  Genitourinary: Negative for dysuria and hematuria  Musculoskeletal: Negative for arthralgias and back pain  Skin: Negative for color change and rash  Neurological: Positive for dizziness  Negative for seizures and syncope  All other systems reviewed and are negative  Past Medical and Surgical History:     Past Medical History:   Diagnosis Date    Disease of thyroid gland     History of CVA (cerebrovascular accident)     Hypertension     Moyamoya disease     Psychiatric disorder     Stroke ([facility] [address] )     Tobacco use        Past Surgical History:   Procedure Laterality Date    BRAIN SURGERY      BRAIN SURGERY  04/10/2017    moyamoya       Meds/Allergies:    Prior to Admission medications    Medication Sig Start Date End Date Taking?  Authorizing Provider   aspirin 325 mg tablet Take 1 tablet (325 mg total) by mouth daily 10/12/18   Julio C Chen PA-C   atorvastatin (LIPITOR) 40 mg tablet Take 1 tablet (40 mg total) by mouth every evening 8/27/18   Grupo Jain MD   levothyroxine 137 mcg tablet Take 1 tablet (137 mcg total) by mouth daily in the early morning 10/12/18   Julio C Chen PA-C   divalproex sodium (DEPAKOTE) 500 mg EC tablet Take 1 tablet (500 mg total) by mouth 2 (two) times a day 10/11/18 8/31/22  Julio C Chen PA-C   doxepin (SINEquan) 25 mg capsule Take 1 capsule (25 mg total) by mouth daily at bedtime 10/11/18 8/31/22  Julio C Chen PA-C   gabapentin (NEURONTIN) 300 mg capsule Take 1 capsule (300 mg total) by mouth 3 (three) times a day 10/11/18 8/31/22  Karla Warner PA-C   melatonin 3 mg Take 2 tablets (6 mg total) by mouth daily at bedtime as needed (insomnia) 10/11/18 8/31/22  Karla Warner PA-C   PARoxetine (PAXIL) 20 mg tablet Take 1 tablet (20 mg total) by mouth daily 10/12/18 8/31/22  Karla Warner PA-C   potassium chloride (K-DUR,KLOR-CON) 10 mEq tablet Take 1 tablet (10 mEq total) by mouth 2 (two) times a day 7/17/18 8/31/22  Jarek Garduno MD   QUEtiapine (SEROquel) 50 mg tablet Take 1 tablet (50 mg total) by mouth 2 (two) times a day 10/11/18 8/31/22  Karla Warner PA-C     I have reviewed home medications using allscripts  Allergies: Allergies   Allergen Reactions    Penicillin G Hives       Social History:     Marital Status: Single   Occupation: NA  Patient Pre-hospital Living Situation: Home  Patient Pre-hospital Level of Mobility: Walks  Patient Pre-hospital Diet Restrictions: Diabetic  Substance Use History:   Social History     Substance and Sexual Activity   Alcohol Use No     Social History     Tobacco Use   Smoking Status Current Some Day Smoker    Packs/day: 0 50    Types: Cigarettes   Smokeless Tobacco Never Used     Social History     Substance and Sexual Activity   Drug Use No       Family History:    Family History   Problem Relation Age of Onset    Depression Mother     Heart disease Father     Hypertension Father     Diabetes Father     Breast cancer Maternal Grandmother        Physical Exam:     Vitals:   Blood Pressure: 133/73 (08/31/22 0030)  Pulse: 92 (08/31/22 0030)  Temperature: 97 8 °F (36 6 °C) (08/31/22 0035)  Temp Source: Oral (08/31/22 0035)  Respirations: 19 (08/31/22 0030)  Weight - Scale: 84 5 kg (186 lb 4 6 oz) (08/31/22 0017)  SpO2: 97 % (08/31/22 0030)    Physical Exam  Vitals and nursing note reviewed  Constitutional:       General: She is not in acute distress  Appearance: She is well-developed  HENT:      Head: Normocephalic and atraumatic        Mouth/Throat: Pharynx: Oropharynx is clear  Eyes:      Conjunctiva/sclera: Conjunctivae normal    Cardiovascular:      Rate and Rhythm: Normal rate and regular rhythm  Pulses: Normal pulses  Heart sounds: No murmur heard  Pulmonary:      Effort: Pulmonary effort is normal  No respiratory distress  Breath sounds: Normal breath sounds  Abdominal:      General: Bowel sounds are normal       Palpations: Abdomen is soft  Tenderness: There is no abdominal tenderness  Musculoskeletal:         General: Normal range of motion  Cervical back: Neck supple  Right lower leg: No edema  Left lower leg: No edema  Skin:     General: Skin is warm and dry  Neurological:      Mental Status: She is alert and oriented to person, place, and time  Cranial Nerves: Cranial nerves are intact  Sensory: Sensation is intact  Motor: Motor function is intact  Coordination: Coordination is intact  Gait: Gait is intact  Comments: Left upper and lower visual cut          Additional Data:     Lab Results: I have personally reviewed pertinent reports  Results from last 7 days   Lab Units 08/31/22  0035   WBC Thousand/uL 15 01*   HEMOGLOBIN g/dL 11 1*   HEMATOCRIT % 36 1   PLATELETS Thousands/uL 349     Results from last 7 days   Lab Units 08/31/22  0035   SODIUM mmol/L 133*   POTASSIUM mmol/L 3 3*   CHLORIDE mmol/L 95*   CO2 mmol/L 23   BUN mg/dL 16   CREATININE mg/dL 1 01   ANION GAP mmol/L 15*   CALCIUM mg/dL 8 3   GLUCOSE RANDOM mg/dL 162*     Results from last 7 days   Lab Units 08/31/22  0035   INR  1 01     Results from last 7 days   Lab Units 08/31/22  0020   POC GLUCOSE mg/dl 182*               Imaging: I have personally reviewed pertinent reports        CTA stroke alert (head/neck)   Final Result by Perez Howe MD (08/31 0127)      Redemonstrated chronic extensive changes of underlying moyamoya disease within the cervical and intracranial vasculature and associated secondary collateralization overall similar in appearance to prior study dated 8/24/2018       2 basilar tip aneurysms as above, largest projecting posteriorly and measuring approximately 5 mm  These aneurysms were not well visualized on the prior study and demonstrate significant interval enlargement  Neuro interventional follow-up advised  Findings were directly discussed with Hoda Campbell at 12:45 AM on 8/31/2022  Workstation performed: [id number]         CT stroke alert brain   Final Result by Nixon Moseley MD (08/31 0036)      No acute intracranial abnormality  MRI is more sensitive for detection of early ischemia and may be considered for further evaluation  Findings were directly discussed with Vicky Sierra at 12:29 AM       Workstation performed: [id number]         X-ray chest 1 view portable    (Results Pending)   MRI Inpatient Order    (Results Pending)       EKG, Pathology, and Other Studies Reviewed on Admission:   · EKG: Reviewed    Allscripts / Epic Records Reviewed: Yes     ** Please Note: This note has been constructed using a voice recognition system   **

## 2022-08-31 NOTE — ASSESSMENT & PLAN NOTE
· WBC 15 01 on admission  · Afebrile and without acute signs of infection  · Monitor off of antibiotics

## 2022-08-31 NOTE — PHYSICAL THERAPY NOTE
Physical Therapy Evaluation     Patient's Name: Melissa Villagran    Admitting Diagnosis  Dizziness [R42]    Problem List  Patient Active Problem List   Diagnosis    History of CVA (cerebrovascular accident)    Tobacco use    Moyamoya disease    Leukocytosis    Hypothyroidism    Psychosocial problem    Homeless    Dizziness    Miscarriage within last 12 months    Syncope    Acute CVA (cerebrovascular accident) ([facility] [address] )    Blurry vision, left eye    Major depression with psychotic features ([facility] [address] )    Hypokalemia    Alcohol use    [facility]    Abnormal CT of the head       Past Medical History  Past Medical History:   Diagnosis Date    Disease of thyroid gland     History of CVA (cerebrovascular accident)     Hypertension     Moyamoya disease     Psychiatric disorder     Stroke ([facility] [address] )     Tobacco use        Past Surgical History  Past Surgical History:   Procedure Laterality Date    BRAIN SURGERY      BRAIN SURGERY  04/10/2017    moyamoya        08/31/22 0910   PT Last Visit   PT Visit Date 08/31/22   Note Type   Note type Evaluation   Pain Assessment   Pain Assessment Tool 0-10   Pain Score 2   Pain Location/Orientation Location: Head   Pain Onset/Description Descriptor: Aching   Restrictions/Precautions   Weight Bearing Precautions Per Order No   Braces or Orthoses Other (Comment)  (None reported)   Other Precautions Multiple lines;Telemetry; Fall Risk   Home Living   Type of [address] [place] [address] One level;Stairs to enter with rails; Performs ADLs on one level; Able to live on main level with bedroom/bathroom  (13 stairs)   Bathroom Shower/Tub Tub/shower unit   Bathroom Toilet Standard   Bathroom Equipment Other (Comment)  (none per patient)   [address] Other (Comment)  (none per patient)   Additional Comments Ambulates without device at baseline   Prior Function   Level of [place] Independent with ADLs and functional mobility   Lives With Other (Comment); Family  (Roommate)   Receives Help From Other (Comment)   ADL Assistance Independent   IADLs Independent   Falls in the last 6 months 0   Vocational On disability   Comments (-) drives   General   Family/Caregiver Present No   Cognition   Overall Cognitive Status WFL   Attention Within functional limits   Orientation Level Oriented X4   Memory Within functional limits   Following Commands Follows all commands and directions without difficulty   Comments Pt agreeable to PT   RLE Assessment   RLE Assessment X   Strength RLE   RLE Overall Strength 4/5   LLE Assessment   LLE Assessment X   Strength LLE   LLE Overall Strength 4-/5   Light Touch   RLE Light Touch Grossly intact   LLE Light Touch Grossly intact   Bed Mobility   Supine to Sit 5  Supervision   Additional items Assist x 1;HOB elevated; Increased time required   Sit to Supine 5  Supervision   Additional items Assist x 1;HOB elevated; Increased time required   Additional Comments Pt received at start of session supine in bed with HOB elevated  Following session, pt returned to bed and remained with needs met, call bell within reach   Transfers   Sit to Stand   (CGA)   Additional items Assist x 1; Armrests; Increased time required;Verbal cues   Stand to Sit   (CGA)   Additional items Assist x 1; Armrests; Increased time required;Verbal cues   Additional Comments without device   Ambulation/Elevation   Gait pattern Decreased foot clearance; Inconsistent agueda; Short stride; Foward flexed;Narrow SYLVIA   Gait Assistance   (CGA)   Additional items Assist x 1;Verbal cues   Assistive Device None   Distance 15'   Ambulation/Elevation Additional Comments Limited by reports of increased dizzziness throughout mobility   Balance   Static Sitting Good   Dynamic Sitting Fair +   Static Standing Fair   Dynamic Standing Fair -   Ambulatory Fair -   Endurance Deficit   Endurance Deficit Yes   Endurance Deficit Description decreased activity tolerance limited by increased dizziness   Activity Tolerance   Activity Tolerance Patient limited by fatigue   Medical Staff Made Aware ANT Olivas   Nurse Made Aware  [address]   Assessment   Prognosis Good   Problem List Decreased strength;Decreased endurance;Decreased mobility; Impaired balance; Impaired vision   Assessment Pt is 38 y o  female seen for PT evaluation s/p admit to [facility] on 8/30/2022 w/ Dizziness  PT consulted to assess pt's functional mobility and d/c needs  Order placed for PT eval and tx, w/ up w/ A order  Comorbidities affecting pt's physical performance at time of assessment include: dizziness, diabetes, hypokalemia, alcohol use  PTA, pt was independent w/ all functional mobility w/ no assistive device, ambulates community distances and elevations, ambulates household distances, has 13 MICHAEL, lives w/ roommate in one level apartment and on disability  Personal factors affecting pt at time of IE include: inaccessible home environment, stairs to enter home, inability to ambulate household distances, inability to navigate community distances, unable to perform dynamic tasks in community, inability to perform IADLs and inability to perform ADLs  Please find objective findings from PT assessment regarding body systems outlined above with impairments and limitations including weakness, impaired balance, decreased endurance, gait deviations, decreased activity tolerance, decreased functional mobility tolerance, fall risk and impaired vision  The following objective measures performed on IE also reveal limitations: Barthel Index: 50/100, Modified [place]: 4 (moderate/severe disability) and AM-PAC 6-Clicks: 14/70   Pt's clinical presentation is currently unstable/unpredictable seen in pt's presentation of continued need for medical management and monitoring, decreased strength and balance resulting in an increased risk for falls, impaired vision, reports of increased dizziness with all mobility, impaired endurance and activity tolerance  Pt to benefit from continued PT tx to address deficits as defined above and maximize level of functional independent mobility and consistency  From PT/mobility standpoint, recommendation at time of d/c would be post acute rehabilitation services pending progress in order to facilitate return to PLOF  Barriers to Discharge Inaccessible home environment; Other (Comment)  (decline in functional status)   Goals   STG Expiration Date 09/10/22   Short Term Goal #1 In 7-10 days: Increase bilateral LE strength 1/2 grade to facilitate independent mobility, Perform all transfers modified independent to improve independence, Ambulate > 100 ft  with least restrictive assistive device modified independent w/o LOB and w/ normalized gait pattern 100% of the time, Tolerate 4 hr OOB to faciliate upright tolerance, Tolerate standing 5 minutes to facilitate functional task performance, PT provider will perform functional balance assessment to determine fall risk and PT to see and establish goals for stair negotiation when appropriate   Plan   Treatment/Interventions LE strengthening/ROM; Functional transfer training;ADL retraining; Therapeutic exercise; Endurance training;Patient/family training;Bed mobility;Gait training; Compensatory technique education;Spoke to nursing;OT;Elevations   PT Frequency 3-5x/wk   Recommendation   PT Discharge Recommendation Post acute rehabilitation services   AM-PAC Basic Mobility Inpatient   Turning in Bed Without Bedrails 3   Lying on Back to Sitting on Edge of Flat Bed 3   Moving Bed to Chair 3   Standing Up From Chair 3   Walk in Room 3   Climb 3-5 Stairs 1   Basic Mobility Inpatient Raw Score 16   Basic Mobility Standardized Score 38 32   Highest Level Of Mobility   JH-HLM Goal 5: Stand one or more mins   JH-HLM Achieved 6: Walk 10 steps or more   Modified Matthews Scale   Modified Matthews Scale 4   Barthel Index   Feeding 10   Bathing 0   Grooming Score 0   Dressing Score 5   Bladder Score 10 Bowels Score 10   Toilet Use Score 5   Transfers (Bed/Chair) Score 10   Mobility (Level Surface) Score 0   Stairs Score 0   Barthel Index Score 50       Ashely Gutierrez, PT

## 2022-08-31 NOTE — ASSESSMENT & PLAN NOTE
· CTA head/neck:  2 basilar tip aneurysms as above, largest projecting posteriorly and measuring approximately 5 mm    These aneurysms were not well visualized on the prior study and demonstrate significant interval enlargement  · Patient will need outpatient follow-up with Neurosurgery

## 2022-08-31 NOTE — CONSULTS
Consultation - Neurology   Torrey Thomas 38 y o  female MRN: [id number]  Unit/Bed#: ED 29 Encounter: [id number]      Assessment/Plan     * Dizziness  Assessment & Plan  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 which demonstrated significant interval enlargement from prior CTA  Patient was not an IV thrombolytics candidate due to symptoms resolving/clearly non disabling  No IR target was identified on CTA head and neck  Patient was admitted on the stroke pathway and it was recommended that Aggrenox be held and patient was loaded with aspirin 325 mg x 1 and and cilostazol 100 mg then continued on aspirin 81 mg daily as well as cilostazol 100 mg BID  Patient was also noted to have leukocytosis on admission  MRI brain without contrast was completed and demonstrates no acute intracranial pathology  Stable chronic bilateral posterior cerebral artery distribution infarcts right greater than left noted  Patient continues to complain of dizzy sensation as well as worsened vision on the left side  Etiology of symptoms unclear, but concern for possible recrudescence of prior stroke symptoms in the setting of possible infection given leukocytosis  Plan:   - Recommend evaluate for underlying infection   Consider check UA, Covid-19/influenza A and B/RSV, blood cultures, CXR    - Given that symptoms are felt not to be secondary to vascular etiology, would recommend she continue on home dose of Aggrenox and can d/c Cilostazol and ASA  - Recommend notify neurology team with any changes in exam    - Recommend outpatient follow-up with her neurologist      Leukocytosis  Assessment & Plan  - Recommend evaluate for underlying infection    - Consider check UA, Covid-19/influenza A and B/RSV, CXR    - Defer work-up to medicine team     Moyamoya disease  Assessment & Plan  - Patient previously followed with neurosurgery team at [facility] and neurology team at [facility] but it is unclear why she has not been following with them  - Encouraged patient to re-establish care with her outpatient neurologist        History of CVA (cerebrovascular accident)  Assessment & Plan  - History of B/L PCA infarcts in setting of Briscoe Briscoe disease  Diabetes [facility])  Assessment & Plan  Lab Results   Component Value Date    HGBA1C 7 5 (H) 08/31/2022       Recent Labs     08/31/22  0020 08/31/22  0212 08/31/22  0705 08/31/22  1116   POCGLU 182* 150* 206* 176*       Blood Sugar Average: Last 72 hrs:  (P) 178 5     - Goal euglycemia    - Management as per medicine team     Hypothyroidism  Assessment & Plan  - Recommend continue with levothyroxine as per medicine team     Tobacco use  Assessment & Plan  - Encouraged patient to quit smoking  Alcohol use  Assessment & Plan  - Encourage cessation    - Thiamine and folic acid as per medicine team    - Monitor for signs/symtoms of withdrawal      Hypokalemia  Assessment & Plan  - Replete as per medicine team         Gisela Jose will need follow up in in 6 weeks with neurovascular attending  She will not require outpatient neurological testing  Patient follows with [facility] Neurology      History of Present Illness     Reason for Consult / Principal Problem: History of CVA/Dizziness  Hx and PE limited by: Patient poor historian  HPI: Gisela Jose is a 38 y o   female with HTN, Briscoe Briscoe disease, history of stroke, tobacco abuse who presents with complaints of dizziness and worsened vision  To review, patient was seen by [facility] inpatient neurology team in August 2018  At that time, she was admitted with complaints of left eye blurred vision  At that time, she was noted to be noncompliant with medications and had not taken ASA in several days  She was found to have acute to subacute left PCA infarct and foci in the left PCA/MCA watershed distribution during that admission and strokes were felt to likely be secondary to moyamoya disease with hypoperfusion as well as medication noncompliance  She was recommended to continue on aspirin 325 mg daily and Lipitor 40 mg daily she was instructed not to drive until formally evaluated by Neuro-Ophthalmology and to continue following with her outpatient neurologist at [facility] group  Per review of [facility] records, patient was diagnosed with moyamoya syndrome in 2012 and she was noted to have sporadic episodes of transient, self resolving neurologic symptoms consisting of left arm numbness, left facial numbness, occasional word-finding difficulty, and chronic headache  Per notes, patient noted that her symptoms primarily occurred when she was noncompliant with her medications  Her last office visit was in February 2019 and it was noted that she had been seen by neurosurgical team at [address] [place] [address] and medication was changed from aspirin to Aggrenox and was also instructed to follow-up with them after patient had resolved some social issues  At her neurology visit in February 2019, patient was encouraged to quit smoking and also encouraged to follow-up with neurosurgical team   She was instructed to remain compliant with medications including Aggrenox and statin  Patient was seen in March 2022 by PCP at [facility] for Medicare wellness visit    She was noted to have been admitted to the hospital and diagnosed with diabetes mellitus type 2 prior to this visit  She was also noted to have depression and also ETOH use  Patient presented to [id number] [place] [address] on August 30, 2022 with complaints of dizziness and lightheadedness as well as worsening of her L sided visual deficit  Stroke alert was activated and Neurology respond immediately via telephone  She underwent CTH which demonstrated no acute intracranial abnormality and CTA head and neck which demonstrated chronic extensive changes of underlying moyamoya disease within the cervical and intracranial vasculature and associated secondary collateralization overall similar in appearance to prior study dated August 24, 2018  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 and demonstrated significant interval enlargement  Patient was not an IV thrombolytics candidate due to symptoms resolved/clearly non disabling  No IR target was identified on CTA head and neck  She was recommended to be admitted on the stroke pathway and Aggrenox was recommend to be held and patient was to be loaded with aspirin 325 mg x 1 and then continued on ASA 81 mg QD and also Cilostazole 100 mg x 1 and then continued on 100 mg BID  Permissive hypertension with maximum systolic blood pressure  goal <180 was recommended  Patient notes that dizziness started suddenly yesterday and was associated with worsened vision on her left side  She notes that she does have baseline dizziness as well as decreased peripheral vision on the left related to a prior stroke she had several years ago  She notes that she will intermittently have similar symptoms at times  She notes that this sensation of dizziness lasted for a few hours yesterday that her worsened left-sided vision has continued to be present  She notes that the worsened vision has lasted longer this episode than it has previously  She notes that this typically will last for a few hours, but this has been present since yesterday   She notes that for the past several days, she has had cough and also had episodes of diarrhea this morning  She also notes that she has been feeling fatigued  She notes she had her first COVID 19 vaccine about two weeks ago  She notes that she has been compliant with her medications and has not missed any doses  She notes that she lives with a roommate, but she is primarily home alone  She notes she manages her own medications  She notes she has not seen her neurologist in several years but is not sure why  Patient notes that she has a history of moyamoya and has underwent previous bypass for this at [facility]  She notes that she continues to smoke and also drinks alcohol  She notes she has three drinks per day and at times this will be beer and other times she drinks rum  Consult to Neurology  Consult performed by: Holley Adamson PA-C  Consult ordered by: Neftali Hart PA-C          Review of Systems   Constitutional: Positive for fatigue  Negative for appetite change, chills and fever  HENT: Negative for trouble swallowing  Eyes: Positive for visual disturbance  Respiratory: Positive for cough  Negative for shortness of breath  Cardiovascular: Negative for chest pain and palpitations  Gastrointestinal: Positive for diarrhea  Negative for abdominal pain, nausea and vomiting  Genitourinary: Negative for difficulty urinating and dysuria  Musculoskeletal: Negative for back pain, gait problem and neck pain  Skin: Negative for rash  Neurological: Positive for dizziness and light-headedness  Negative for speech difficulty, weakness, numbness and headaches  Psychiatric/Behavioral: Negative for confusion         Historical Information   Past Medical History:   Diagnosis Date    Disease of thyroid gland     History of CVA (cerebrovascular accident)     Hypertension     Moyamoya disease     Psychiatric disorder     Stroke ([facility] [address] )     Tobacco use      Past Surgical History:   Procedure Laterality Date    BRAIN SURGERY      BRAIN SURGERY  04/10/2017    trya     Social History   Social History     Substance and Sexual Activity   Alcohol Use No     Social History     Substance and Sexual Activity   Drug Use No     E-Cigarette/Vaping     E-Cigarette/Vaping Substances     Social History     Tobacco Use   Smoking Status Current Some Day Smoker    Packs/day: 0 50    Types: Cigarettes   Smokeless Tobacco Never Used     Family History:   Family History   Problem Relation Age of Onset    Depression Mother     Heart disease Father     Hypertension Father     Diabetes Father     Breast cancer Maternal Grandmother        Review of previous medical records was completed  Please see HPI      Meds/Allergies   Scheduled Meds:  Current Facility-Administered Medications   Medication Dose Route Frequency Provider Last Rate    acetaminophen  650 mg Oral Q6H PRN Aashish Marin PA-C      aspirin  81 mg Oral Daily Altagracia Robles PA-C      atorvastatin  40 mg Oral QPM Altagracia Robles PA-C      cilostazol  100 mg Oral BID AC Altagracia Robles PA-C      enoxaparin  40 mg Subcutaneous Daily Altagracia Robles PA-C      folic acid  1 mg Oral Daily Altagracia Robles PA-C      insulin lispro  1-5 Units Subcutaneous HS Altagracia Robles PA-C      insulin lispro  1-6 Units Subcutaneous TID AC Altagracia Robles PA-C      levothyroxine  150 mcg Oral Early Morning Aashish Marin PA-C      multivitamin-minerals  1 tablet Oral Daily Altagracia Robles PA-C      [START ON 9/1/2022] nicotine  1 patch Transdermal Daily Altagracia Robles PA-C      nicotine  21 mg Transdermal Once [employer], DO      sodium chloride  125 mL/hr Intravenous Continuous Altagracia Robles PA-C 125 mL/hr (08/31/22 0220)    thiamine  100 mg Oral Daily Altagracia Robles PA-C       Continuous Infusions:sodium chloride, 125 mL/hr, Last Rate: 125 mL/hr (08/31/22 0220)      PRN Meds:   acetaminophen      Allergies   Allergen Reactions    Penicillin G Hives       Objective Vitals:Blood pressure 155/90, pulse 92, temperature 97 8 °F (36 6 °C), temperature source Oral, resp  rate 19, weight 84 5 kg (186 lb 4 6 oz), SpO2 95 %, not currently breastfeeding  ,Body mass index is 35 2 kg/m²  Intake/Output Summary (Last 24 hours) at 8/31/2022 0815  Last data filed at 8/31/2022 0156  Gross per 24 hour   Intake 1000 ml   Output --   Net 1000 ml       Invasive Devices: Invasive Devices  Report    Peripheral Intravenous Line  Duration           Peripheral IV 08/31/22 Right Antecubital <1 day                Physical Exam  Constitutional:       General: She is not in acute distress  Appearance: Normal appearance  She is not ill-appearing, toxic-appearing or diaphoretic  HENT:      Head: Normocephalic and atraumatic  Mouth/Throat:      Mouth: Mucous membranes are moist       Pharynx: Oropharynx is clear  No oropharyngeal exudate or posterior oropharyngeal erythema  Eyes:      General: No scleral icterus  Right eye: No discharge  Left eye: No discharge  Extraocular Movements: Extraocular movements intact  Conjunctiva/sclera: Conjunctivae normal       Pupils: Pupils are equal, round, and reactive to light  Cardiovascular:      Rate and Rhythm: Normal rate and regular rhythm  Pulmonary:      Effort: Pulmonary effort is normal  No respiratory distress  Breath sounds: Normal breath sounds  Abdominal:      General: There is no distension  Palpations: Abdomen is soft  Tenderness: There is no abdominal tenderness  Musculoskeletal:         General: Normal range of motion  Cervical back: Normal range of motion and neck supple  Right lower leg: No edema  Left lower leg: No edema  Skin:     General: Skin is warm and dry  Findings: No erythema or rash  Neurological:      Mental Status: She is alert and oriented to person, place, and time        Coordination: Finger-Nose-Finger Test and Heel to Rosan Sharpe Test normal       Deep Tendon Reflexes:      Reflex Scores:       Bicep reflexes are 2+ on the right side and 2+ on the left side  Brachioradialis reflexes are 2+ on the right side and 2+ on the left side  Patellar reflexes are 1+ on the right side and 1+ on the left side  Achilles reflexes are 1+ on the right side and 1+ on the left side  Psychiatric:         Mood and Affect: Mood normal          Speech: Speech normal          Behavior: Behavior normal        Neurologic Exam     Mental Status   Oriented to person, place, and time  Oriented to person  Oriented to place  Oriented to time  Attention: normal  Concentration: normal    Speech: speech is normal   Level of consciousness: alert  Knowledge: good  Able to name object  Able to repeat  Normal comprehension  Patient was able to answer questions, but at times was slowed to respond  Cranial Nerves     CN II   Right visual field deficit: Diminished peripheral vision throughout, but no clear field cut  Left visual field deficit: upper temporal and lower temporal quadrant(s)    CN III, IV, VI   Pupils are equal, round, and reactive to light  Right pupil: Size: 5 mm  Shape: regular  Reactivity: brisk  Consensual response: intact  Left pupil: Size: 5 mm  Shape: regular  Reactivity: brisk  Consensual response: intact     Nystagmus: none   Diplopia: none  Ophthalmoparesis: none  Upgaze: normal  Downgaze: normal  Conjugate gaze: present    CN V   Right facial sensation deficit: none  Left facial sensation deficit: none    CN VII   Right facial weakness: none  Left facial weakness: none    CN VIII   Hearing: intact    CN IX, X   Palate: symmetric    CN XI   Right sternocleidomastoid strength: normal  Left sternocleidomastoid strength: normal    CN XII   Tongue: not atrophic  Fasciculations: absent  Tongue deviation: none    Motor Exam   Muscle bulk: normal  Overall muscle tone: normal  Right arm tone: normal  Left arm tone: normal  Right arm pronator drift: absent  Left arm pronator drift: absent  Right leg tone: normal  Left leg tone: normalMotor strength 5/5 B/L UE and LE     Sensory Exam   Right arm light touch: normal  Left arm light touch: normal  Right leg light touch: normal  Left leg light touch: normal  Right arm vibration: normal  Left arm vibration: normal  Right leg vibration: normal  Left leg vibration: normal    Gait, Coordination, and Reflexes     Gait  Gait: (Deferred for safety )    Coordination   Finger to nose coordination: normal  Heel to shin coordination: normal    Reflexes   Right brachioradialis: 2+  Left brachioradialis: 2+  Right biceps: 2+  Left biceps: 2+  Right patellar: 1+  Left patellar: 1+  Right achilles: 1+  Left achilles: 1+  Right plantar: normal  Left plantar: normal      Lab Results:   Recent Results (from the past 24 hour(s))   ECG 12 lead    Collection Time: 08/30/22 11:43 PM   Result Value Ref Range    Ventricular Rate 103 BPM    Atrial Rate 103 BPM    NJ Interval 150 ms    QRSD Interval 60 ms    QT Interval 358 ms    QTC Interval 468 ms    P Axis 68 degrees    QRS Axis 86 degrees    T Wave Axis 91 degrees   Fingerstick Glucose (POCT)    Collection Time: 08/31/22 12:20 AM   Result Value Ref Range    POC Glucose 182 (H) 65 - 140 mg/dl   Basic metabolic panel    Collection Time: 08/31/22 12:35 AM   Result Value Ref Range    Sodium 133 (L) 135 - 147 mmol/L    Potassium 3 3 (L) 3 5 - 5 3 mmol/L    Chloride 95 (L) 96 - 108 mmol/L    CO2 23 21 - 32 mmol/L    ANION GAP 15 (H) 4 - 13 mmol/L    BUN 16 5 - 25 mg/dL    Creatinine 1 01 0 60 - 1 30 mg/dL    Glucose 162 (H) 65 - 140 mg/dL    Calcium 8 3 8 3 - 10 1 mg/dL    eGFR 67 ml/min/1 73sq m   CBC and Platelet    Collection Time: 08/31/22 12:35 AM   Result Value Ref Range    WBC 15 01 (H) 4 31 - 10 16 Thousand/uL    RBC 4 74 3 81 - 5 12 Million/uL    Hemoglobin 11 1 (L) 11 5 - 15 4 g/dL    Hematocrit 36 1 34 8 - 46 1 %    MCV 76 (L) 82 - 98 fL    MCH 23 4 (L) 26 8 - 34 3 pg    MCHC 30 7 (L) 31 4 - 37 4 g/dL    RDW 16 4 (H) 11 6 - 15 1 %    Platelets 666 141 - 013 Thousands/uL    MPV 9 4 8 9 - 12 7 fL   Protime-INR    Collection Time: 08/31/22 12:35 AM   Result Value Ref Range    Protime 13 1 11 6 - 14 5 seconds    INR 1 01 0 84 - 1 19   APTT    Collection Time: 08/31/22 12:35 AM   Result Value Ref Range    PTT 27 23 - 37 seconds   HS Troponin 0hr (reflex protocol)    Collection Time: 08/31/22 12:35 AM   Result Value Ref Range    hs TnI 0hr 42 "Refer to ACS Flowchart"- see link ng/L   hCG, qualitative pregnancy    Collection Time: 08/31/22 12:35 AM   Result Value Ref Range    Preg, Serum Negative Negative   Fingerstick Glucose (POCT)    Collection Time: 08/31/22  2:12 AM   Result Value Ref Range    POC Glucose 150 (H) 65 - 140 mg/dl   HS Troponin I 2hr    Collection Time: 08/31/22  3:23 AM   Result Value Ref Range    hs TnI 2hr 36 "Refer to ACS Flowchart"- see link ng/L    Delta 2hr hsTnI -6 <20 ng/L   HS Troponin I 4hr    Collection Time: 08/31/22  5:43 AM   Result Value Ref Range    hs TnI 4hr 36 "Refer to ACS Flowchart"- see link ng/L    Delta 4hr hsTnI -6 <20 ng/L   Lipid Panel with Direct LDL reflex    Collection Time: 08/31/22  5:43 AM   Result Value Ref Range    Cholesterol 269 (H) See Comment mg/dL    Triglycerides 319 (H) See Comment mg/dL    HDL, Direct 27 (L) >=50 mg/dL    LDL Calculated 178 (H) 0 - 100 mg/dL   Comprehensive metabolic panel    Collection Time: 08/31/22  5:43 AM   Result Value Ref Range    Sodium 140 135 - 147 mmol/L    Potassium 3 1 (L) 3 5 - 5 3 mmol/L    Chloride 102 96 - 108 mmol/L    CO2 26 21 - 32 mmol/L    ANION GAP 12 4 - 13 mmol/L    BUN 17 5 - 25 mg/dL    Creatinine 0 92 0 60 - 1 30 mg/dL    Glucose 202 (H) 65 - 140 mg/dL    Calcium 9 0 8 3 - 10 1 mg/dL    Corrected Calcium 9 6 8 3 - 10 1 mg/dL    AST 59 (H) 5 - 45 U/L     (H) 12 - 78 U/L    Alkaline Phosphatase 115 46 - 116 U/L    Total Protein 6 9 6 4 - 8 4 g/dL    Albumin 3 2 (L) 3 5 - 5 0 g/dL    Total Bilirubin 0 30 0 20 - 1 00 mg/dL    eGFR 75 ml/min/1 73sq m   CBC (With Platelets)    Collection Time: 08/31/22  5:43 AM   Result Value Ref Range    WBC 15 10 (H) 4 31 - 10 16 Thousand/uL    RBC 4 84 3 81 - 5 12 Million/uL    Hemoglobin 11 4 (L) 11 5 - 15 4 g/dL    Hematocrit 37 1 34 8 - 46 1 %    MCV 77 (L) 82 - 98 fL    MCH 23 6 (L) 26 8 - 34 3 pg    MCHC 30 7 (L) 31 4 - 37 4 g/dL    RDW 16 4 (H) 11 6 - 15 1 %    Platelets 434 454 - 750 Thousands/uL    MPV 10 2 8 9 - 12 7 fL   Fingerstick Glucose (POCT)    Collection Time: 08/31/22  7:05 AM   Result Value Ref Range    POC Glucose 206 (H) 65 - 140 mg/dl       Imaging Studies: I have personally reviewed pertinent reports  and I have personally reviewed pertinent films in PACS  CTH- No acute intracranial abnormality  MRI is more sensitive for detection of early ischemia and may be considered for further evaluation  CTA head and neck with and without contrast- Re demonstrated chronic extensive changes of underlying calderon calderon disease within the cervical and intracranial vasculature and associated secondary collateralization overall similar in appearance to prior study dated August 24, 2018  Two basilar tip aneurysms, largest projecting posteriorly and measuring approximately 5 mm    These aneurysms were not well visualized on the prior study in demonstrate significant interval enlargement neurointerventional follow-up was advised    VTE Prophylaxis: Enoxaparin (Lovenox)

## 2022-08-31 NOTE — ASSESSMENT & PLAN NOTE
Lab Results   Component Value Date    HGBA1C 7 5 (H) 08/31/2022       Recent Labs     08/31/22  0020 08/31/22  0212 08/31/22  0705 08/31/22  1116   POCGLU 182* 150* 206* 176*       Blood Sugar Average: Last 72 hrs:  (P) 178 5     - Goal euglycemia    - Management as per medicine team

## 2022-08-31 NOTE — ASSESSMENT & PLAN NOTE
- Replete as per medicine team  Procedure(s):  CYSTOSCOPY/BILATERAL URETEROSCOPY/HOLMIUM LASER LITHOTRIPSY/DOUBLE J STENT REPLACEMENT. general    Anesthesia Post Evaluation      Multimodal analgesia: multimodal analgesia used between 6 hours prior to anesthesia start to PACU discharge  Patient location during evaluation: PACU  Patient participation: complete - patient participated  Level of consciousness: sleepy but conscious  Pain score: 0  Pain management: adequate  Airway patency: patent  Anesthetic complications: no  Cardiovascular status: acceptable  Respiratory status: acceptable and room air  Hydration status: acceptable  Post anesthesia nausea and vomiting:  none  Final Post Anesthesia Temperature Assessment:  Normothermia (36.0-37.5 degrees C)      INITIAL Post-op Vital signs:   Vitals Value Taken Time   /70 6/1/2020  1:38 PM   Temp     Pulse 64 6/1/2020  1:40 PM   Resp 8 6/1/2020  1:40 PM   SpO2 98 % 6/1/2020  1:40 PM   Vitals shown include unvalidated device data.

## 2022-08-31 NOTE — ASSESSMENT & PLAN NOTE
- Encourage cessation    - Thiamine and folic acid as per medicine team    - Monitor for signs/symtoms of withdrawal

## 2022-08-31 NOTE — PLAN OF CARE
Problem: MOBILITY - ADULT  Goal: Maintain or return to baseline ADL function  Description: INTERVENTIONS:  -  Assess patient's ability to carry out ADLs; assess patient's baseline for ADL function and identify physical deficits which impact ability to perform ADLs (bathing, care of mouth/teeth, toileting, grooming, dressing, etc )  - Assess/evaluate cause of self-care deficits   - Assess range of motion  - Assess patient's mobility; develop plan if impaired  - Assess patient's need for assistive devices and provide as appropriate  - Encourage maximum independence but intervene and supervise when necessary  - Involve family in performance of ADLs  - Assess for home care needs following discharge   - Consider OT consult to assist with ADL evaluation and planning for discharge  - Provide patient education as appropriate  Outcome: Progressing  Goal: Maintains/Returns to pre admission functional level  Description: INTERVENTIONS:  - Perform BMAT or MOVE assessment daily    - Set and communicate daily mobility goal to care team and patient/family/caregiver  - Collaborate with rehabilitation services on mobility goals if consulted  - Perform Range of Motion  times a day  - Reposition patient every  hours    - Dangle patient  times a day  - Stand patient times a day  - Ambulate patient  times a day  - Out of bed to chair  times a day   - Out of bed for meals  times a day  - Out of bed for toileting  - Record patient progress and toleration of activity level   Outcome: Progressing     Problem: PAIN - ADULT  Goal: Verbalizes/displays adequate comfort level or baseline comfort level  Description: Interventions:  - Encourage patient to monitor pain and request assistance  - Assess pain using appropriate pain scale  - Administer analgesics based on type and severity of pain and evaluate response  - Implement non-pharmacological measures as appropriate and evaluate response  - Consider cultural and social influences on pain and pain management  - Notify physician/advanced practitioner if interventions unsuccessful or patient reports new pain  Outcome: Progressing     Problem: INFECTION - ADULT  Goal: Absence or prevention of progression during hospitalization  Description: INTERVENTIONS:  - Assess and monitor for signs and symptoms of infection  - Monitor lab/diagnostic results  - Monitor all insertion sites, i e  indwelling lines, tubes, and drains  - Monitor endotracheal if appropriate and nasal secretions for changes in amount and color  - [place] appropriate cooling/warming therapies per order  - Administer medications as ordered  - Instruct and encourage patient and family to use good hand hygiene technique  - Identify and instruct in appropriate isolation precautions for identified infection/condition  Outcome: Progressing  Goal: Absence of fever/infection during neutropenic period  Description: INTERVENTIONS:  - Monitor WBC    Outcome: Progressing     Problem: SAFETY ADULT  Goal: Maintain or return to baseline ADL function  Description: INTERVENTIONS:  -  Assess patient's ability to carry out ADLs; assess patient's baseline for ADL function and identify physical deficits which impact ability to perform ADLs (bathing, care of mouth/teeth, toileting, grooming, dressing, etc )  - Assess/evaluate cause of self-care deficits   - Assess range of motion  - Assess patient's mobility; develop plan if impaired  - Assess patient's need for assistive devices and provide as appropriate  - Encourage maximum independence but intervene and supervise when necessary  - Involve family in performance of ADLs  - Assess for home care needs following discharge   - Consider OT consult to assist with ADL evaluation and planning for discharge  - Provide patient education as appropriate  Outcome: Progressing  Goal: Maintains/Returns to pre admission functional level  Description: INTERVENTIONS:  - Perform BMAT or MOVE assessment daily    - Set and communicate daily mobility goal to care team and patient/family/caregiver  - Collaborate with rehabilitation services on mobility goals if consulted  - Perform Range of Motion  times a day  - Reposition patient every  hours    - Dangle patient  times a day  - Stand patient  times a day  - Ambulate patient  times a day  - Out of bed to chair  times a day   - Out of bed for meals  times a day  - Out of bed for toileting  - Record patient progress and toleration of activity level   Outcome: Progressing  Goal: Patient will remain free of falls  Description: INTERVENTIONS:  - Educate patient/family on patient safety including physical limitations  - Instruct patient to call for assistance with activity   - Consult OT/PT to assist with strengthening/mobility   - Keep Call bell within reach  - Keep bed low and locked with side rails adjusted as appropriate  - Keep care items and personal belongings within reach  - Initiate and maintain comfort rounds  - Make Fall Risk Sign visible to staff  - Offer Toileting every  Hours, in advance of need  - Initiate/Maintain alarm  - Obtain necessary fall risk management equipment  - Apply yellow socks and bracelet for high fall risk patients  - Consider moving patient to room near nurses station  Outcome: Progressing     Problem: DISCHARGE PLANNING  Goal: Discharge to home or other facility with appropriate resources  Description: INTERVENTIONS:  - Identify barriers to discharge w/patient and caregiver  - Arrange for needed discharge resources and transportation as appropriate  - Identify discharge learning needs (meds, wound care, etc )  - Arrange for interpretive services to assist at discharge as needed  - Refer to Case Management Department for coordinating discharge planning if the patient needs post-hospital services based on physician/advanced practitioner order or complex needs related to functional status, cognitive ability, or social support system  Outcome: Progressing Problem: Knowledge Deficit  Goal: Patient/family/caregiver demonstrates understanding of disease process, treatment plan, medications, and discharge instructions  Description: Complete learning assessment and assess knowledge base    Interventions:  - Provide teaching at level of understanding  - Provide teaching via preferred learning methods  Outcome: Progressing

## 2022-08-31 NOTE — ASSESSMENT & PLAN NOTE
- Recommend evaluate for underlying infection    - Consider check UA, Covid-19/influenza A and B/RSV, CXR    - Defer work-up to medicine team

## 2022-08-31 NOTE — ASSESSMENT & PLAN NOTE
· Upon questioning patient reports drinking a few times a week  Reports that she drinks about 3 alcoholic beverages every couple days    Reports that her last drink was 3 days ago  · Will monitor for signs and symptoms of alcohol withdrawal  · Start on thiamine, folic acid, multivitamin  · Encouraged alcohol cessation

## 2022-08-31 NOTE — ASSESSMENT & PLAN NOTE
- Patient previously followed with neurosurgery team at [facility] and neurology team at [facility] but it is unclear why she has not been following with them     - Encouraged patient to re-establish care with her outpatient neurologist

## 2022-08-31 NOTE — PLAN OF CARE
Problem: PHYSICAL THERAPY ADULT  Goal: Performs mobility at highest level of function for planned discharge setting  See evaluation for individualized goals  Description: Treatment/Interventions: LE strengthening/ROM, Functional transfer training, ADL retraining, Therapeutic exercise, Endurance training, Patient/family training, Bed mobility, Gait training, Compensatory technique education, Spoke to nursing, OT, Elevations          See flowsheet documentation for full assessment, interventions and recommendations  Outcome: Progressing  Note: Prognosis: Good  Problem List: Decreased strength, Decreased endurance, Decreased mobility, Impaired balance, Impaired vision  Assessment: Pt is 38 y o  female seen for PT evaluation s/p admit to [facility] on 8/30/2022 w/ Dizziness  PT consulted to assess pt's functional mobility and d/c needs  Order placed for PT eval and tx, w/ up w/ A order  Comorbidities affecting pt's physical performance at time of assessment include: dizziness, diabetes, hypokalemia, alcohol use  PTA, pt was independent w/ all functional mobility w/ no assistive device, ambulates community distances and elevations, ambulates household distances, has 13 MICHAEL, lives w/ roommate in one level apartment and on disability  Personal factors affecting pt at time of IE include: inaccessible home environment, stairs to enter home, inability to ambulate household distances, inability to navigate community distances, unable to perform dynamic tasks in community, inability to perform IADLs and inability to perform ADLs  Please find objective findings from PT assessment regarding body systems outlined above with impairments and limitations including weakness, impaired balance, decreased endurance, gait deviations, decreased activity tolerance, decreased functional mobility tolerance, fall risk and impaired vision   The following objective measures performed on IE also reveal limitations: Barthel Index: 50/100, Modified [place]: 4 (moderate/severe disability) and AM-PAC 6-Clicks: 39/86  Pt's clinical presentation is currently unstable/unpredictable seen in pt's presentation of continued need for medical management and monitoring, decreased strength and balance resulting in an increased risk for falls, impaired vision, reports of increased dizziness with all mobility, impaired endurance and activity tolerance  Pt to benefit from continued PT tx to address deficits as defined above and maximize level of functional independent mobility and consistency  From PT/mobility standpoint, recommendation at time of d/c would be post acute rehabilitation services pending progress in order to facilitate return to PLOF  Barriers to Discharge: Inaccessible home environment, Other (Comment) (decline in functional status)     PT Discharge Recommendation: Post acute rehabilitation services    See flowsheet documentation for full assessment

## 2022-08-31 NOTE — ASSESSMENT & PLAN NOTE
Patient presenting to the ED for dizziness and worsening peripheral vision deficit  Patient has a known history of moyamoya and multiple prior strokes  Previously was on Plavix, then switch to aspirin, then eventually Aggrenox  Denies any associated facial droop, slurred speech, weakness, numbness/tingling  Stroke alert called in the ED    · CTA head/neck: Redemonstrated chronic extensive changes of underlying moyamoya disease within the cervical and intracranial vasculature and associated secondary collateralization overall similar in appearance to prior study dated 8/24/2018  · CT head:  No acute intracranial abnormality  · ECG: Sinus tachycardia, right atrial enlargement, non-specific ST changes  · Follow stroke pathway  · Starting on daily asa, high dose statin, cilostazole 100mg BID  · Allow for permissive HTN up to SBP <180  · Obtain MRI  · PT/OT eval  · Monitor on telemetry  · Consult to Neurology, recommendations are appreciated

## 2022-08-31 NOTE — ASSESSMENT & PLAN NOTE
Lab Results   Component Value Date    HGBA1C 8 3 (H) 02/22/2022       Recent Labs     08/31/22  0020   POCGLU 182*       Blood Sugar Average: Last 72 hrs:  (P) 182     · Holding home metformin during hospitalization  · Start on SSI with accu checks  · Hypoglycemia protocol  · Diabetic diet

## 2022-08-31 NOTE — OCCUPATIONAL THERAPY NOTE
Occupational Therapy Evaluation     Patient Name: Jaclyn Holstein  LWFTO'R Date: 8/31/2022  Problem List  Principal Problem:    Dizziness  Active Problems:    Tobacco use    Leukocytosis    Hypothyroidism    Hypokalemia    Alcohol use    Diabetes ([facility] [address] )    Abnormal CT of the head    Past Medical History  Past Medical History:   Diagnosis Date    Disease of thyroid gland     History of CVA (cerebrovascular accident)     Hypertension     Moyamoya disease     Psychiatric disorder     Stroke ([facility] [address] )     Tobacco use      Past Surgical History  Past Surgical History:   Procedure Laterality Date    BRAIN SURGERY      BRAIN SURGERY  04/10/2017    moyamoya         08/31/22 0800   OT Last Visit   OT Visit Date 08/31/22   Note Type   Note type Evaluation   Additional Comments Pt received supine in bed on this date reporting min headache/ 6/10 dizziness + is agreeable to OT session @ this time  Nsg staff verbally cleared pt for OT evaluation  @ exit, pt remains in bed c all lines intance, all needs met, call bell in hand + nsg aware of location/disposition  Restrictions/Precautions   Weight Bearing Precautions Per Order No   Braces or Orthoses Other (Comment)  (None reported)   Other Precautions Multiple lines;Telemetry; Fall Risk   Pain Assessment   Pain Assessment Tool 0-10   Pain Score 2   Pain Location/Orientation Location: Head   Pain Onset/Description Descriptor: Aching   Home Living   Type of Home Apartment   Home Layout One level;Stairs to enter with rails; Performs ADLs on one level; Able to live on main level with bedroom/bathroom  (13 stairs)   Bathroom Shower/Tub Tub/shower unit   Bathroom Toilet Standard   Bathroom Equipment Other (Comment)  (None reported )   [address] Other (Comment)  (None reported  No AD utilized @ baseline )   Prior Function   Level of [place] Independent with ADLs and functional mobility   Lives With Other (Comment); Family  (Roommate) Receives Help From Other (Comment)   ADL Assistance Independent   IADLs Independent   Falls in the last 6 months 0   Vocational On disability   Comments (-), reports public transportation  Walks to grocery store next door  Lifestyle   Autonomy Pt lives in apartment c friend + @ baseline, performs ADLs without A, IADLs without A + fxl mobility without A/AD  Reciprocal Relationships Roommate   Psychosocial   Psychosocial (WDL) WDL   ADL   Eating Assistance 5  Supervision/Setup   Eating Deficit Setup   Grooming Assistance 5  Supervision/Setup   Grooming Deficit Setup   UB Bathing Assistance 5  Supervision/Setup   UB Bathing Deficit Setup   LB Bathing Assistance   (CGA)   LB Bathing Deficit Setup;Steadying;Supervision/safety   UB Dressing Assistance 5  Supervision/Setup   UB Dressing Deficit Setup   LB Dressing Assistance   (CGA)   LB Dressing Deficit Setup;Steadying   Toileting Assistance  4  Minimal Assistance   Toileting Deficit Setup;Steadying;Supervison/safety   Bed Mobility   Supine to Sit 5  Supervision   Sit to Supine 5  Supervision   Transfers   Sit to Stand   (CGA)   Additional items Assist x 1  (Steadying)   Stand to Sit   (CGA)   Additional items Assist x 1  (Steadying)   Functional Mobility   Functional Mobility   (CGA)   Additional Comments Pt performed short distance ADL related fxl mobility in room c CGA A/no AD simulating toileting distances  No overt LOB demonstrated + pt denies pain/ SOB but reports dizziness ranging 6-8/10 in severity  G safety awareness noted while navigating t/o room  Balance   Static Sitting Good   Dynamic Sitting Fair +   Static Standing Fair   Dynamic Standing Fair -   Ambulatory Fair -   Activity Tolerance   Medical Staff Made Aware PT/OT co-eval on this date d/t medical complexity, ambulatory dysfunction c high fall risk, various impeding lines + requirement for skilled interdisciplinary analysis of appropriate d/c recommendations   PT/OT POC/goals I'ly determined per respective discipline  Jo-Ann Baltazar   [facility] staff made aware of current fxn, recommendations for d/c planning, fall risk + pt's location upon exit  (Mini)   RUE Assessment   RUE Assessment WNL   LUE Assessment   LUE Assessment WNL   Hand Function   Gross Motor Coordination Functional   Fine Motor Coordination Impaired   Sensation   Light Touch No apparent deficits   Vision-Basic Assessment   Current Vision Wears glasses only for reading   Visual History Other (Comment)  (Hx of CVA resulting in peripheral deficits)   Patient Visual Report Other (Comment)  (Reports worsened visual deficits including field cut/ peripheral difficulties )   Vision - Complex Assessment   Ocular Range of Motion Restricted on the left; Restricted looking down   Head Position WDL   Tracking L eye does not track laterally   Visual Fields Difficulty detecting stimulus with OD LUQ; Difficulty detecting stimulus with OD LLQ; Difficulty detecting stimulus with OS LLQ; Difficulty detecting stimulus with OS LUQ   Visual Screen Results Inferior homonymous visual field deficit; Left homonymous peripheral visual field deficit   Perception   Inattention/Neglect Appears intact   Cognition   Overall Cognitive Status WFL   Arousal/Participation Alert;Arousable; Cooperative   Attention Within functional limits   Orientation Level Oriented X4   Memory Within functional limits   Following Commands Follows all commands and directions without difficulty   Comments Reports awareness of increased expressive aphasia since hospitalization  Nsg aware  Assessment   Limitation Decreased ADL status; Decreased cognition;Decreased self-care trans   Prognosis Good   Assessment Patient is a 38 y o  female seen for OT evaluation s/p admit to [id number] [facility] on 8/30/2022 w/Dizziness   Commorbidities + PMHx affecting patient's functional performance c ADL tasks at time of assessment include: tobacco use, leukocytosis, hypothyroidism, hypokalemia, alcohol use, diabetes, hx of CVA  OT orders placed for evaluation and treatment to assess pt's ADLs, cognitive status + performance during functional tasks in order to formulate appropriate d/c recommendations  Therapist performed at least two patient identifiers during session including name and wristband  Personal factors affecting patient at time of initial evaluation include: limited caregiver support, inaccesible home environment, steps to enter, difficulty performing ADLs and difficulty performing IADLs, new need for AD and inability to navigate community distances  Pt currently presents c WFL ability to collaboratively engage in d/c planning  Pt's clinical presentation is currently evolving given new onset deficits that effect pt's occupational performance and ability to safely return to PLOF including decrease activity tolerance, decrease standing balance, decrease sitting balance, decrease performance during ADL tasks, decrease performance during functional transfers, limited family support and high fall risk, combined with medical complications of hypertension , abnormal renal lab values, abnormal CBC, abnormal blood sugars, abnormal potassium values and need for input for mobility technique/safety  Patient to benefit from continued Occupational Therapy treatment while in the hospital to address aforementioned deficits and maximize level of functional independence with ADLs and functional mobility  From OT standpoint, recommendation at time of d/c would be Short Term Rehab  Plan   Treatment Interventions Visual perceptual retraining;ADL retraining;Functional transfer training;Patient/family training; Compensatory technique education; Activityengagement; Energy conservation   Goal Expiration Date 09/10/22   OT Treatment Day 0   OT Frequency 3-5x/wk   Recommendation   OT Discharge Recommendation Post acute rehabilitation services   AM-PAC Daily Activity Inpatient   Lower Body Dressing 3   Bathing 3   Toileting 3   Upper Body Dressing 3   Grooming 3   Eating 3   Daily Activity Raw Score 18   Daily Activity Standardized Score (Calc for Raw Score >=11) 38 66   AM-[facility] Applied Cognition Inpatient   Following a Speech/Presentation 4   Understanding Ordinary Conversation 4   Taking Medications 3   Remembering Where Things Are Placed or Put Away 3   Remembering List of 4-5 Errands 3   Taking Care of Complicated Tasks 3   Applied Cognition Raw Score 20   Applied Cognition Standardized Score 41 76   The patient's raw score on the AM-PAC Daily Activity inpatient short form is 18, standardized score is 38 66, less than 39 4  Patients at this level are likely to benefit from DC to post-acute rehabilitation services  Please refer to the recommendation of the Occupational Therapist for safe DC planning  GOALS:    *ADL transfers with (I) for inc'd independence with ADLs/purposeful tasks    *UB ADL with (I) for inc'd independence with self cares    *LB ADL with (I) using AE prn for inc'd independence with self cares    *Toileting with (I) for clothing management and hygiene for return to PLOF with personal care    *Increase stand tolerance x 15  m for inc'd tolerance with standing purposeful tasks    *Participate in 10m UE therex to increase overall stamina/activity tolerance for purposeful tasks    *Bed mobility- (I) for inc'd independence to manage own comfort and initiate EOB & OOB purposeful tasks    *Patient will verbalize 3 safety awareness/ principles to prevent falls in the home setting  *Patient will verbalize and demonstrate use of energy conservation/deep breathing techniques and work simplification skills during functional activities with no verbal cues  *Patient will increase OOB/sitting tolerance to 2-4 hours per day to increase participation in self-care and leisure tasks with no s/s of exertion       *Patient will engage in ongoing cognitive assessment to assist with safe discharge planning/recommendations  *Pt will verbalize and demonstrate understanding of post-op movement precautions 388% (if applicable) in tx sessions for increased safety and functional mobility        *Pt will demonstrate use of long handled AE (if appropriate) during 100% of tx sessions for increased ADL safety and independence following D/C     Steffi Murray, OTR/L

## 2022-08-31 NOTE — ED PROVIDER NOTES
History  Chief Complaint   Patient presents with    Dizziness     Pt arrives via EMS c/o dizziness and shakiness starting approx  1 hour ago  Pt has a hx of 'calderon calderon' disorder  Denies chest pain, N/V  Hx of stroke     Patient is a 57-year-old female past medical history of moyamoya, CVA, hypertension, hypothyroid, depression presenting with dizziness  Patient states she began feeling lightheaded dizzy 1 hour ago while sitting down rolling a cigarette  She states it has been constant not related to movement since that time  Notes aching in the same timeframe  States that she has visual field loss from prior CVA  Denies chest pain, fevers, nausea vomiting, shortness breath, rashes, dysuria, numbness or tingling or weakness  Is compliant with her Aggrenox  Does state that she believes that her left-sided visual field cuts are worsened over the past 2 hours  Prior to Admission Medications   Prescriptions Last Dose Informant Patient Reported? Taking?   aspirin 325 mg tablet   No No   Sig: Take 1 tablet (325 mg total) by mouth daily   atorvastatin (LIPITOR) 40 mg tablet   No No   Sig: Take 1 tablet (40 mg total) by mouth every evening   levothyroxine 137 mcg tablet   No No   Sig: Take 1 tablet (137 mcg total) by mouth daily in the early morning      Facility-Administered Medications: None       Past Medical History:   Diagnosis Date    Disease of thyroid gland     History of CVA (cerebrovascular accident)     Hypertension     Moyamoya disease     Psychiatric disorder     Stroke ([facility] [address] )     Tobacco use        Past Surgical History:   Procedure Laterality Date    BRAIN SURGERY      BRAIN SURGERY  04/10/2017    moyamoya       Family History   Problem Relation Age of Onset    Depression Mother     Heart disease Father     Hypertension Father     Diabetes Father     Breast cancer Maternal Grandmother      I have reviewed and agree with the history as documented      E-Cigarette/Vaping E-Cigarette/Vaping Substances     Social History     Tobacco Use    Smoking status: Current Some Day Smoker     Packs/day: 0 50     Types: Cigarettes    Smokeless tobacco: Never Used   Substance Use Topics    Alcohol use: No    Drug use: No       Review of Systems   All other systems reviewed and are negative  Physical Exam  Physical Exam  Vitals reviewed  Constitutional:       General: She is not in acute distress  Appearance: Normal appearance  She is not ill-appearing  HENT:      Mouth/Throat:      Mouth: Mucous membranes are moist    Eyes:      Extraocular Movements: Extraocular movements intact  Conjunctiva/sclera: Conjunctivae normal       Pupils: Pupils are equal, round, and reactive to light  Cardiovascular:      Rate and Rhythm: Normal rate and regular rhythm  Heart sounds: Normal heart sounds  Pulmonary:      Effort: Pulmonary effort is normal       Breath sounds: Normal breath sounds  Abdominal:      General: Abdomen is flat  Palpations: Abdomen is soft  Tenderness: There is no abdominal tenderness  Musculoskeletal:         General: No swelling  Normal range of motion  Cervical back: Neck supple  Skin:     General: Skin is warm and dry  Neurological:      General: No focal deficit present  Mental Status: She is alert  Cranial Nerves: No cranial nerve deficit  Sensory: No sensory deficit  Motor: No weakness        Coordination: Coordination normal       Comments: Left-sided upper and lower visual field cut   Psychiatric:         Mood and Affect: Mood normal          Vital Signs  ED Triage Vitals   Temperature Pulse Respirations Blood Pressure SpO2   08/31/22 0035 08/30/22 2334 08/30/22 2334 08/30/22 2334 08/30/22 2334   97 8 °F (36 6 °C) (!) 109 19 135/90 99 %      Temp Source Heart Rate Source Patient Position - Orthostatic VS BP Location FiO2 (%)   08/31/22 0035 08/30/22 2334 08/30/22 2334 08/30/22 2334 --   Oral Monitor Lying Right arm       Pain Score       08/31/22 0015       5           Vitals:    08/31/22 0100 08/31/22 0115 08/31/22 0215 08/31/22 0300   BP: 137/71 135/68 (!) 180/81 124/56   Pulse: 89 86 93 96   Patient Position - Orthostatic VS: Lying Lying Lying Lying         Visual Acuity  Visual Acuity    Flowsheet Row Most Recent Value   L Pupil Size (mm) 4   R Pupil Size (mm) 4          ED Medications  Medications   nicotine (NICODERM CQ) 21 mg/24 hr TD 24 hr patch 21 mg (21 mg Transdermal Medication Applied 8/31/22 0127)   cilostazol (PLETAL) tablet 100 mg (has no administration in time range)   thiamine tablet 100 mg (has no administration in time range)   folic acid (FOLVITE) tablet 1 mg (has no administration in time range)   multivitamin-minerals (CENTRUM) tablet 1 tablet (has no administration in time range)   atorvastatin (LIPITOR) tablet 40 mg (has no administration in time range)   levothyroxine tablet 150 mcg (has no administration in time range)   acetaminophen (TYLENOL) tablet 650 mg (has no administration in time range)   nicotine (NICODERM CQ) 21 mg/24 hr TD 24 hr patch 1 patch (has no administration in time range)   enoxaparin (LOVENOX) subcutaneous injection 40 mg (has no administration in time range)   insulin lispro (HumaLOG) 100 units/mL subcutaneous injection 1-6 Units (has no administration in time range)   insulin lispro (HumaLOG) 100 units/mL subcutaneous injection 1-5 Units (1 Units Subcutaneous Given 8/31/22 0214)   aspirin (ECOTRIN LOW STRENGTH) EC tablet 81 mg (has no administration in time range)   sodium chloride 0 9 % infusion (125 mL/hr Intravenous New Bag 8/31/22 0220)   sodium chloride 0 9 % bolus 1,000 mL (0 mL Intravenous Stopped 8/31/22 0156)   iohexol (OMNIPAQUE) 350 MG/ML injection (SINGLE-DOSE) 85 mL (85 mL Intravenous Given 8/31/22 0011)   meclizine (ANTIVERT) tablet 50 mg (50 mg Oral Given 8/31/22 0044)   aspirin chewable tablet 324 mg (324 mg Oral Given 8/31/22 0126)   potassium chloride (K-DUR,KLOR-CON) CR tablet 40 mEq (40 mEq Oral Given 8/31/22 0214)       Diagnostic Studies  Results Reviewed     Procedure Component Value Units Date/Time    Lipid Panel with Direct LDL reflex [[id number]]     Lab Status: No result Specimen: Blood     Hemoglobin A1c w/EAG Estimation [[id number]]     Lab Status: No result Specimen: Blood     Comprehensive metabolic panel [[id number]]     Lab Status: No result Specimen: Blood     CBC (With Platelets) [[id number]]     Lab Status: No result Specimen: Blood     HS Troponin I 2hr [[id number]]  (Normal) Collected: 08/31/22 0323    Lab Status: Final result Specimen: Blood from Arm, Right Updated: 08/31/22 0351     hs TnI 2hr 36 ng/L      Delta 2hr hsTnI -6 ng/L     HS Troponin I 4hr [[id number]]     Lab Status: No result Specimen: Blood     Fingerstick Glucose (POCT) [[id number]]  (Abnormal) Collected: 08/31/22 0212    Lab Status: Final result Updated: 08/31/22 0217     POC Glucose 150 mg/dl     HS Troponin 0hr (reflex protocol) [[id number]]  (Normal) Collected: 08/31/22 0035    Lab Status: Final result Specimen: Blood from Arm, Right Updated: 08/31/22 0105     hs TnI 0hr 42 ng/L     hCG, qualitative pregnancy [[id number]]  (Normal) Collected: 08/31/22 0035    Lab Status: Final result Specimen: Blood from Arm, Right Updated: 08/31/22 0102     Preg, Serum Negative    Protime-INR [[id number]]  (Normal) Collected: 08/31/22 0035    Lab Status: Final result Specimen: Blood from Arm, Right Updated: 08/31/22 0054     Protime 13 1 seconds      INR 1 01    APTT [[id number]]  (Normal) Collected: 08/31/22 0035    Lab Status: Final result Specimen: Blood from Arm, Right Updated: 08/31/22 0054     PTT 27 seconds     Basic metabolic panel [[id number]]  (Abnormal) Collected: 08/31/22 0035    Lab Status: Final result Specimen: Blood from Arm, Right Updated: 08/31/22 0053     Sodium 133 mmol/L      Potassium 3 3 mmol/L      Chloride 95 mmol/L      CO2 23 mmol/L      ANION GAP 15 mmol/L      BUN 16 mg/dL      Creatinine 1 01 mg/dL      Glucose 162 mg/dL      Calcium 8 3 mg/dL      eGFR 67 ml/min/1 73sq m     Narrative:      [place] guidelines for Chronic Kidney Disease (CKD):     Stage 1 with normal or high GFR (GFR > 90 mL/min/1 73 square meters)    Stage 2 Mild CKD (GFR = 60-89 mL/min/1 73 square meters)    Stage 3A Moderate CKD (GFR = 45-59 mL/min/1 73 square meters)    Stage 3B Moderate CKD (GFR = 30-44 mL/min/1 73 square meters)    Stage 4 Severe CKD (GFR = 15-29 mL/min/1 73 square meters)    Stage 5 End Stage CKD (GFR <15 mL/min/1 73 square meters)  Note: GFR calculation is accurate only with a steady state creatinine    CBC and Platelet [[id number]]  (Abnormal) Collected: 08/31/22 0035    Lab Status: Final result Specimen: Blood from Arm, Right Updated: 08/31/22 0041     WBC 15 01 Thousand/uL      RBC 4 74 Million/uL      Hemoglobin 11 1 g/dL      Hematocrit 36 1 %      MCV 76 fL      MCH 23 4 pg      MCHC 30 7 g/dL      RDW 16 4 %      Platelets 411 Thousands/uL      MPV 9 4 fL     Fingerstick Glucose (POCT) [[id number]]  (Abnormal) Collected: 08/31/22 0020    Lab Status: Final result Updated: 08/31/22 0021     POC Glucose 182 mg/dl                  CTA stroke alert (head/neck)   Final Result by Scout Azar MD (08/31 0127)      Redemonstrated chronic extensive changes of underlying moyamoya disease within the cervical and intracranial vasculature and associated secondary collateralization overall similar in appearance to prior study dated 8/24/2018       2 basilar tip aneurysms as above, largest projecting posteriorly and measuring approximately 5 mm  These aneurysms were not well visualized on the prior study and demonstrate significant interval enlargement  Neuro interventional follow-up advised  Findings were directly discussed with Olga Schultz at 12:45 AM on 8/31/2022                          Workstation performed: [id number]         CT stroke alert brain Final Result by Mendoza Mccall MD (08/31 0036)      No acute intracranial abnormality  MRI is more sensitive for detection of early ischemia and may be considered for further evaluation        Findings were directly discussed with Mora Barrera at 12:29 AM       Workstation performed: [id number]         X-ray chest 1 view portable    (Results Pending)   MRI Inpatient Order    (Results Pending)              Procedures  ECG 12 Lead Documentation Only    Date/Time: 8/30/2022 11:56 PM  Performed by: Cuco Branch DO  Authorized by: Cuco Branch DO     ECG reviewed by me, the ED Provider: yes    Patient location:  ED  Previous ECG:     Previous ECG:  Compared to current    Similarity:  No change  Interpretation:     Interpretation: normal    Rate:     ECG rate assessment: normal    Rhythm:     Rhythm: sinus rhythm    Ectopy:     Ectopy: none    QRS:     QRS axis:  Normal    QRS intervals:  Normal  Conduction:     Conduction: normal    ST segments:     ST segments:  Normal  T waves:     T waves: normal      CriticalCare Time  Performed by: Cuco Branch DO  Authorized by: Cuco Branch DO     Critical care provider statement:     Critical care time (minutes):  40    Critical care was necessary to treat or prevent imminent or life-threatening deterioration of the following conditions:  CNS failure or compromise    Critical care was time spent personally by me on the following activities:  Obtaining history from patient or surrogate, development of treatment plan with patient or surrogate, discussions with consultants, evaluation of patient's response to treatment, examination of patient, interpretation of cardiac output measurements, ordering and performing treatments and interventions, ordering and review of laboratory studies, ordering and review of radiographic studies and re-evaluation of patient's condition             ED Course  ED Course as of 08/31/22 0453   Wed Aug 31, 2022 1029 Patient declining tPA   0113 CT CTA unremarkable, neurology recommending aspirin load, still a stays all, blood pressure goals less than 180                               SBIRT 22yo+    Flowsheet Row Most Recent Value   SBIRT (25 yo +)    In order to provide better care to our patients, we are screening all of our patients for alcohol and drug use  Would it be okay to ask you these screening questions? No Filed at: 08/30/2022 2343   Initial Alcohol Screen: US AUDIT-C     1  How often do you have a drink containing alcohol? 4 Filed at: 08/30/2022 2342   2  How many drinks containing alcohol do you have on a typical day you are drinking? 2 Filed at: 08/30/2022 2342   3b  FEMALE Any Age, or MALE 65+: How often do you have 4 or more drinks on one occassion? 1 Filed at: 08/30/2022 2342   Audit-C Score 7 Filed at: 08/30/2022 2342                    MDM  Number of Diagnoses or Management Options  History of CVA (cerebrovascular accident)  Diagnosis management comments: Patient is a 13-year-old female past medical history of moyamoya, CVA, hypothyroid, hypertension presenting with dizziness  Patient is well-appearing bedside though with low-grade tachycardia but in no acute distress  She does have left-sided visual field cuts, unclear whether these are advanced from prior, no other acute neurologic deficits however as patient has history of stroke, noting abrupt onset dizziness and with unsure neurologic changes on exam will call stroke alert        Disposition  Final diagnoses:   History of CVA (cerebrovascular accident)   Dizziness     Time reflects when diagnosis was documented in both MDM as applicable and the Disposition within this note     Time User Action Codes Description Comment    8/30/2022 11:48 PM Minetta Curet Add [W84 90] History of CVA (cerebrovascular accident)     8/31/2022  1:14 AM Minetta Curet Add [R42] Dizziness       ED Disposition     ED Disposition   Admit    Condition   Stable Date/Time   Wed Aug 31, 2022  1:16 AM    Comment   Case was discussed with Camryn Huang and the patient's admission status was agreed to be Admission Status: observation status to the service of Dr Sy Mcallister  Follow-up Information    None         Patient's Medications   Discharge Prescriptions    No medications on file       No discharge procedures on file      PDMP Review     None          ED Provider  Electronically Signed by           Darci Paget, DO  08/31/22 9257

## 2022-08-31 NOTE — QUICK NOTE
Elmarie Brittle          Assessment/Plan   Assessment:  Renate Boles is a 25-year-old woman who presents with dizziness and worsened visual field deficits in the setting of significant stroke risk related to her cerebral vasculature  TPA Decision: Patient not a candidate  Symptoms resolved/clearly non disabling  Plan: -admit on stroke pathway   -suggest aggressive hydration  -permissive hypertension with maximum blood pressure 213 systolic (lower than typical maximum as a result of aneurysm to decrease hemorrhage risk  -recommend holding Aggrenox and instead loading with 325 milligrams aspirin x1 now followed by 81 milligrams daily and cilostazole is all 100 milligrams now and then 100 milligrams twice daily  -high-dose statin   -PT/OT/SP   -no need for echocardiogram at this time   -MRI brain without contrast  -outpatient follow-up with Neurosurgery with regard to aneurysm    History of Present Illness     Reason for Consult / Principal Problem: Stroke Alert  Hx and PE limited by: none  Patient last known well: 2 hours PTA 8/30/22  Stroke alert called: 2349  8/30/22  Neurology time of arrival: 2359 8/30/22    HPI: Elmarie Brittle is a 38 y o  female who presents as a stroke alert  She has a known history of moyamoya and multiple prior strokes  Remotely was on Plavix then switch to aspirin and eventually Aggrenox  She has a prior right-sided superficial temporal artery bypass, likely indirect  Reportedly this evening she developed onset of lightheadedness with a worsening of her baseline vision deficit  NIH stroke scale is 2  After a discussion with the patient by the emergency room team she opted against tenecteplase     I personally reviewed her noncontrast head CT which reveals no evidence of intercerebral hemorrhage or large territorial infarction    I also reviewed her CT angiogram which confirms stability compared with her prior scan in 2018  I discussed this with the radiology group who reviewed the scan and agreed with the exception of some increased overall collateralization  Incidental note is also made of 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 [address] [facility]     Tobacco use        Social History     Socioeconomic History    Marital status: Single     Spouse name: Not on file    Number of children: Not on file    Years of education: Not on file    Highest education level: Not on file   Occupational History    Not on file   Tobacco Use    Smoking status: Current Some Day Smoker     Packs/day: 0 50     Types: Cigarettes    Smokeless tobacco: Never Used   Substance and Sexual Activity    Alcohol use: No    Drug use: No    Sexual activity: Yes     Partners: Male   Other Topics Concern    Not on file   Social History Narrative    L[facility] in [facility]     Social Determinants of Health     Financial Resource Strain: Not on file   Food Insecurity: Not on file   Transportation Needs: Not on file   Physical Activity: Not on file   Stress: Not on file   Social Connections: Not on file   Intimate Partner Violence: Not on file   Housing Stability: Not on file       Meds/Allergies   PTA meds:   Prior to Admission Medications   Prescriptions Last Dose Informant Patient Reported? Taking?    PARoxetine (PAXIL) 20 mg tablet   No No   Sig: Take 1 tablet (20 mg total) by mouth daily   QUEtiapine (SEROquel) 50 mg tablet   No No   Sig: Take 1 tablet (50 mg total) by mouth 2 (two) times a day   aspirin 325 mg tablet   No No   Sig: Take 1 tablet (325 mg total) by mouth daily   atorvastatin (LIPITOR) 40 mg tablet   No No   Sig: Take 1 tablet (40 mg total) by mouth every evening   divalproex sodium (DEPAKOTE) 500 mg EC tablet   No No Sig: Take 1 tablet (500 mg total) by mouth 2 (two) times a day   doxepin (SINEquan) 25 mg capsule   No No   Sig: Take 1 capsule (25 mg total) by mouth daily at bedtime   gabapentin (NEURONTIN) 300 mg capsule   No No   Sig: Take 1 capsule (300 mg total) by mouth 3 (three) times a day   levothyroxine 137 mcg tablet   No No   Sig: Take 1 tablet (137 mcg total) by mouth daily in the early morning   melatonin 3 mg   No No   Sig: Take 2 tablets (6 mg total) by mouth daily at bedtime as needed (insomnia)   potassium chloride (K-DUR,KLOR-CON) 10 mEq tablet   No No   Sig: Take 1 tablet (10 mEq total) by mouth 2 (two) times a day      Facility-Administered Medications: None         Patient Vitals for the past 24 hrs:   BP Temp Temp src Pulse Resp SpO2 Weight   08/31/22 0035 -- 97 8 °F (36 6 °C) Oral -- -- -- --   08/31/22 0017 -- -- -- -- -- -- 84 5 kg (186 lb 4 6 oz)   08/31/22 0015 140/81 -- -- (!) 109 18 98 % --   08/30/22 2334 135/90 -- -- (!) 109 19 99 % --

## 2022-09-01 ENCOUNTER — HOME HEALTH ADMISSION (OUTPATIENT)
Dept: HOME HEALTH SERVICES | Facility: HOME HEALTHCARE | Age: 42
End: 2022-09-01

## 2022-09-01 VITALS
RESPIRATION RATE: 18 BRPM | HEIGHT: 61 IN | SYSTOLIC BLOOD PRESSURE: 167 MMHG | OXYGEN SATURATION: 95 % | BODY MASS INDEX: 35.12 KG/M2 | HEART RATE: 93 BPM | DIASTOLIC BLOOD PRESSURE: 82 MMHG | TEMPERATURE: 98.3 F | WEIGHT: 186 LBS

## 2022-09-01 LAB
ANION GAP SERPL CALCULATED.3IONS-SCNC: 10 MMOL/L (ref 4–13)
BASOPHILS # BLD AUTO: 0.04 THOUSANDS/ΜL (ref 0–0.1)
BASOPHILS NFR BLD AUTO: 1 % (ref 0–1)
BUN SERPL-MCNC: 10 MG/DL (ref 5–25)
CALCIUM SERPL-MCNC: 7.9 MG/DL (ref 8.3–10.1)
CHLORIDE SERPL-SCNC: 107 MMOL/L (ref 96–108)
CO2 SERPL-SCNC: 23 MMOL/L (ref 21–32)
CREAT SERPL-MCNC: 0.57 MG/DL (ref 0.6–1.3)
EOSINOPHIL # BLD AUTO: 0.23 THOUSAND/ΜL (ref 0–0.61)
EOSINOPHIL NFR BLD AUTO: 3 % (ref 0–6)
ERYTHROCYTE [DISTWIDTH] IN BLOOD BY AUTOMATED COUNT: 16.2 % (ref 11.6–15.1)
GFR SERPL CREATININE-BSD FRML MDRD: 113 ML/MIN/1.73SQ M
GLUCOSE P FAST SERPL-MCNC: 129 MG/DL (ref 65–99)
GLUCOSE SERPL-MCNC: 129 MG/DL (ref 65–140)
GLUCOSE SERPL-MCNC: 137 MG/DL (ref 65–140)
GLUCOSE SERPL-MCNC: 171 MG/DL (ref 65–140)
GLUCOSE SERPL-MCNC: 265 MG/DL (ref 65–140)
HCT VFR BLD AUTO: 30.8 % (ref 34.8–46.1)
HGB BLD-MCNC: 9.2 G/DL (ref 11.5–15.4)
IMM GRANULOCYTES # BLD AUTO: 0.02 THOUSAND/UL (ref 0–0.2)
IMM GRANULOCYTES NFR BLD AUTO: 0 % (ref 0–2)
LYMPHOCYTES # BLD AUTO: 3.66 THOUSANDS/ΜL (ref 0.6–4.47)
LYMPHOCYTES NFR BLD AUTO: 43 % (ref 14–44)
MCH RBC QN AUTO: 23.1 PG (ref 26.8–34.3)
MCHC RBC AUTO-ENTMCNC: 29.9 G/DL (ref 31.4–37.4)
MCV RBC AUTO: 77 FL (ref 82–98)
MONOCYTES # BLD AUTO: 0.38 THOUSAND/ΜL (ref 0.17–1.22)
MONOCYTES NFR BLD AUTO: 5 % (ref 4–12)
NEUTROPHILS # BLD AUTO: 4.19 THOUSANDS/ΜL (ref 1.85–7.62)
NEUTS SEG NFR BLD AUTO: 48 % (ref 43–75)
NRBC BLD AUTO-RTO: 0 /100 WBCS
PLATELET # BLD AUTO: 270 THOUSANDS/UL (ref 149–390)
PMV BLD AUTO: 10 FL (ref 8.9–12.7)
POTASSIUM SERPL-SCNC: 3.5 MMOL/L (ref 3.5–5.3)
RBC # BLD AUTO: 3.99 MILLION/UL (ref 3.81–5.12)
SODIUM SERPL-SCNC: 140 MMOL/L (ref 135–147)
WBC # BLD AUTO: 8.52 THOUSAND/UL (ref 4.31–10.16)

## 2022-09-01 PROCEDURE — 99217 PR OBSERVATION CARE DISCHARGE MANAGEMENT: CPT | Performed by: INTERNAL MEDICINE

## 2022-09-01 PROCEDURE — 85025 COMPLETE CBC W/AUTO DIFF WBC: CPT | Performed by: INTERNAL MEDICINE

## 2022-09-01 PROCEDURE — 82948 REAGENT STRIP/BLOOD GLUCOSE: CPT

## 2022-09-01 PROCEDURE — 80048 BASIC METABOLIC PNL TOTAL CA: CPT | Performed by: INTERNAL MEDICINE

## 2022-09-01 RX ORDER — MECLIZINE HYDROCHLORIDE 25 MG/1
25 TABLET ORAL EVERY 8 HOURS PRN
Qty: 30 TABLET | Refills: 0 | Status: SHIPPED | OUTPATIENT
Start: 2022-09-01

## 2022-09-01 RX ORDER — ONDANSETRON 4 MG/1
4 TABLET, FILM COATED ORAL EVERY 8 HOURS PRN
Qty: 20 TABLET | Refills: 0 | Status: SHIPPED | OUTPATIENT
Start: 2022-09-01

## 2022-09-01 RX ORDER — MECLIZINE HYDROCHLORIDE 25 MG/1
25 TABLET ORAL EVERY 8 HOURS PRN
Qty: 30 TABLET | Refills: 0 | Status: SHIPPED | OUTPATIENT
Start: 2022-09-01 | End: 2022-09-01 | Stop reason: SDUPTHER

## 2022-09-01 RX ORDER — AMLODIPINE BESYLATE 5 MG/1
5 TABLET ORAL ONCE
Status: COMPLETED | OUTPATIENT
Start: 2022-09-01 | End: 2022-09-01

## 2022-09-01 RX ORDER — ATORVASTATIN CALCIUM 80 MG/1
80 TABLET, FILM COATED ORAL EVERY EVENING
Qty: 30 TABLET | Refills: 0 | Status: SHIPPED | OUTPATIENT
Start: 2022-09-01 | End: 2022-09-01 | Stop reason: SDUPTHER

## 2022-09-01 RX ORDER — ONDANSETRON 4 MG/1
4 TABLET, FILM COATED ORAL EVERY 8 HOURS PRN
Qty: 20 TABLET | Refills: 0 | Status: SHIPPED | OUTPATIENT
Start: 2022-09-01 | End: 2022-09-01 | Stop reason: SDUPTHER

## 2022-09-01 RX ORDER — ATORVASTATIN CALCIUM 80 MG/1
80 TABLET, FILM COATED ORAL EVERY EVENING
Qty: 30 TABLET | Refills: 0 | Status: SHIPPED | OUTPATIENT
Start: 2022-09-01

## 2022-09-01 RX ADMIN — ENOXAPARIN SODIUM 40 MG: 40 INJECTION SUBCUTANEOUS at 08:21

## 2022-09-01 RX ADMIN — MECLIZINE HYDROCHLORIDE 25 MG: 25 TABLET ORAL at 05:05

## 2022-09-01 RX ADMIN — SODIUM CHLORIDE 125 ML/HR: 0.9 INJECTION, SOLUTION INTRAVENOUS at 07:24

## 2022-09-01 RX ADMIN — AMLODIPINE BESYLATE 5 MG: 5 TABLET ORAL at 12:09

## 2022-09-01 RX ADMIN — INSULIN LISPRO 1 UNITS: 100 INJECTION, SOLUTION INTRAVENOUS; SUBCUTANEOUS at 08:18

## 2022-09-01 RX ADMIN — NICOTINE 1 PATCH: 21 PATCH, EXTENDED RELEASE TRANSDERMAL at 08:21

## 2022-09-01 RX ADMIN — FOLIC ACID 1 MG: 1 TABLET ORAL at 08:20

## 2022-09-01 RX ADMIN — CILOSTAZOL 100 MG: 100 TABLET ORAL at 08:20

## 2022-09-01 RX ADMIN — LEVOTHYROXINE SODIUM 150 MCG: 150 TABLET ORAL at 05:05

## 2022-09-01 RX ADMIN — THIAMINE HCL TAB 100 MG 100 MG: 100 TAB at 08:22

## 2022-09-01 RX ADMIN — Medication 1 TABLET: at 08:20

## 2022-09-01 RX ADMIN — ASPIRIN 81 MG: 81 TABLET, COATED ORAL at 08:20

## 2022-09-01 RX ADMIN — INSULIN LISPRO 3 UNITS: 100 INJECTION, SOLUTION INTRAVENOUS; SUBCUTANEOUS at 12:11

## 2022-09-01 RX ADMIN — MECLIZINE HYDROCHLORIDE 25 MG: 25 TABLET ORAL at 13:53

## 2022-09-01 NOTE — CASE MANAGEMENT
Case Management Progress Note    Patient name Jaclyn Holstein  Location [address] [id number] MRN [id number]  : 1980 Date 2022       LOS (days): 0  Geometric Mean LOS (GMLOS) (days):   Days to GMLOS:        OBJECTIVE:        Current admission status: Observation  Preferred Pharmacy:   [id number] [address] [id number]  Phone: [phone] Fax: [phone]    Primary Care Provider: Nickie Mckinney MD    Primary Insurance: [facility] REP  Secondary Insurance: [address] NOTE:  CM met with patient at bedside to review d/c disposition  Referrals sent for IRF & STR  Patient has decided she does not want inpatient rehab  Preference is to return home  [facility]  Patient has no provider preference  SL[facility] aware  [facility] referrals cancelled  CM will continue to follow

## 2022-09-01 NOTE — DISCHARGE SUMMARY
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, needs follow-up with Neurosurgery  Hypothyroidism  Leukocytosis, potentially reactive, no evidence of infection clinically, uses a negative    Discharging Physician / Practitioner: Jose Hunter MD  PCP: Kris Curtis MD  Admission Date:   Admission Orders (From admission, onward)     Ordered        08/31/22 0116  Place in Observation  Once                      Discharge Date: 09/01/22    Consultations During Hospital Stay:  · Neurology    Significant Findings / Test Results:   MRI brain wo contrast [[id number]] Collected: 08/31/22 1034   Order Status: Completed Updated: 08/31/22 1116   Narrative:     MRI BRAIN WITHOUT CONTRAST     INDICATION: dizziness; r/o acute stroke    History of moyamoya     COMPARISON:   CT from yesterday, CTA performed today, MRI from 8/24/2018  TECHNIQUE:  Sagittal T1, axial T2, axial FLAIR, axial T1, axial [place] and axial diffusion imaging  IMAGE QUALITY:  Diagnostic  FINDINGS:     BRAIN PARENCHYMA: Stable large region of encephalomalacia and gliosis with hemosiderin in the right parietal, occipital and posterior temporal lobes compatible with remote right posterior cerebral artery distribution infarct  Stable smaller left PCA   distribution infarct with parieto-occipital encephalomalacia  Stable mild T2/FLAIR hyperintensity in the periventricular and subcortical white matter likely due to mild chronic microangiopathy  No restricted diffusion to indicate acute infarct   No acute hemorrhage, mass or mass effect    Cortical volume loss      VENTRICLES:  Ex vacuo dilatation of the temporal and posterior horn of the right lateral ventricle  No hydrocephalus  SELLA AND PITUITARY GLAND:  Normal      ORBITS:  Normal      PARANASAL SINUSES:  Normal      VASCULATURE:  Stable attenuation of intracranial internal carotid artery flow voids with multiple small flow voids in the bilateral basal ganglia compatible with known moyamoya  CALVARIUM AND SKULL BASE:  Normal      EXTRACRANIAL SOFT TISSUES:  Right frontal craniotomy  Impression:       No acute intracranial pathology  Stable chronic bilateral posterior cerebral artery distribution infarcts, right greater than left  Chronic microangiopathy  Stable findings related to known moyamoya  Workstation performed: [id number]    X-ray chest 1 view portable [[id number]] Collected: 08/31/22 0706   Order Status: Completed Updated: 08/31/22 0709   Narrative:     CHEST     INDICATION:   cva   Dizziness  COMPARISON:  Chest radiograph 12/7/2018 and chest CT 8/31/2022  EXAM PERFORMED/VIEWS:  BM CHEST PORTABLE       FINDINGS:     Cardiomediastinal silhouette appears unremarkable  The lungs are clear   No pneumothorax or pleural effusion  Osseous structures appear within normal limits for patient age  Impression:       No acute cardiopulmonary disease  Workstation performed: [id number]    CTA stroke alert (head/neck) [[id number]] Collected: 08/31/22 0030   Order Status: Completed Updated: 08/31/22 0128   Narrative:     CTA NECK AND BRAIN WITH CONTRAST     INDICATION: Stroke Alert     COMPARISON:   8/24/2018  TECHNIQUE:   Post contrast imaging was performed after administration of iodinated contrast through the neck and brain  Post contrast axial 0 625 mm images timed to opacify the arterial system        3D rendering was performed on an independent workstation    MIP reconstructions performed   Coronal reconstructions were performed of the noncontrast portion of the brain        Radiation dose length product (DLP) for this visit:  027 mGy-cm    This examination, like all CT scans performed in the [facility], was performed utilizing techniques to minimize radiation dose exposure, including the use of iterative   reconstruction and automated exposure control        IV Contrast:  85 mL of iohexol (OMNIPAQUE)       IMAGE QUALITY:   Diagnostic     FINDINGS:     CERVICAL VASCULATURE   AORTIC ARCH AND GREAT VESSELS:  Normal aortic arch and great vessel origins  Normal visualized subclavian vessels  RIGHT VERTEBRAL ARTERY CERVICAL SEGMENT:  Normal origin  The vessel is normal in caliber throughout the neck  LEFT VERTEBRAL ARTERY CERVICAL SEGMENT:  Normal origin  The vessel is normal in caliber throughout the neck  RIGHT EXTRACRANIAL CAROTID SEGMENT:  Normal caliber common carotid artery and bifurcate   Redemonstrated smooth circumferential narrowing of the right cervical internal carotid artery beginning at the distal aspect of the bulb and extending to the skull   base similar in appearance to prior  LEFT EXTRACRANIAL CAROTID SEGMENT:  Mild smooth narrowing of the left cervical internal carotid artery beginning at the distal aspect of the bulb and extending to the skull base, also similar to prior  NASCET criteria was used to determine the degree of internal carotid artery diameter stenosis  INTRACRANIAL VASCULATURE   INTERNAL CAROTID ARTERIES:  Redemonstrated stenosis of both intracranial internal carotid arteries extending from the skull base superiorly with occlusion at the level of the anterior clinoids bilaterally   Redemonstrated extensive vascular   collateralization in the region of the ICA termini   Overall this appearance is similar to prior  ANTERIOR CIRCULATION:  Redemonstrated marked narrowing of the A1 segments with perhaps slightly increased narrowing of distal ANIRUDH branches compared to prior       MIDDLE CEREBRAL ARTERY CIRCULATION:  Redemonstrated chronically occluded M1 segments with pronounced collateral vasculature similar in appearance to prior  DISTAL VERTEBRAL ARTERIES:  Normal distal vertebral arteries  Normal vertebral basilar junction  BASILAR ARTERY:  Basilar artery is normal in caliber   There is an anterior and slightly superiorly projecting outpouching at the basilar tip measuring approximately 3 mm in maximal dimension (3:195)   Additional bilobed posteriorly projecting   outpouching seen measuring approximately 5 mm (3:194)   Normal superior cerebellar arteries  POSTERIOR CEREBRAL ARTERIES: Redemonstrated markedly stenotic right P1 segment with adjacent collateral vasculature similar to prior  VENOUS STRUCTURES:  Patent  NON VASCULAR ANATOMY   BONY STRUCTURES:  No acute osseous abnormality   Stable remote craniotomy changes  SOFT TISSUES OF THE NECK:  Normal      THORACIC INLET:  Unremarkable  Impression:       Redemonstrated chronic extensive changes of underlying moyamoya disease within the cervical and intracranial vasculature and associated secondary collateralization overall similar in appearance to prior study dated 8/24/2018      2 basilar tip aneurysms as above, largest projecting posteriorly and measuring approximately 5 mm   These aneurysms were not well visualized on the prior study and demonstrate significant interval enlargement   Neuro interventional follow-up advised  Findings were directly discussed with Sammy Islas at 12:45 AM on 8/31/2022  Workstation performed: [id number]    CT stroke alert brain [[id number]] Collected: 08/31/22 0012   Order Status: Completed Updated: 08/31/22 0059   Narrative:     CT BRAIN - STROKE ALERT PROTOCOL     INDICATION:   Stroke Alert  COMPARISON:  CT head on 7/16/2018       TECHNIQUE:  CT examination of the brain was performed   In addition to axial images, coronal reformatted images were created and submitted for interpretation        Radiation dose length product (DLP) for this visit:  106 mGy-cm    This examination, like all CT scans performed in the [facility], was performed utilizing techniques to minimize radiation dose exposure, including the use of iterative   reconstruction and automated exposure control        IMAGE QUALITY:  Diagnostic  FINDINGS:       PARENCHYMA: Encephalomalacia in the bilateral parietal and occipital lobes consistent with the sequela of old infarcts  Decreased attenuation is noted in periventricular and subcortical white matter demonstrating an appearance that is statistically most   likely to represent mild microangiopathic change; this appearance is similar when compared to most recent prior examination  No CT signs of acute infarction   No intracranial mass, mass effect or midline shift   No acute parenchymal hemorrhage  VENTRICLES AND EXTRA-AXIAL SPACES:  No hydrocephalus   Stable small ovoid calcification in the anterior left temporal region compatible with a calcified meningioma  VISUALIZED ORBITS AND PARANASAL SINUSES:  Unremarkable  CALVARIUM AND EXTRACRANIAL SOFT TISSUES:  Right-sided craniotomy  Impression:       No acute intracranial abnormality   MRI is more sensitive for detection of early ischemia and may be considered for further evaluation  Findings were directly discussed with Juan Frausto at 12:29 AM      Workstation performed: [id number]           Outpatient follow-up Requested:  · Primary care physician  · Neurology  · Neurosurgery    Complications:  None    Reason for Admission: Dizziness    HPI:  Lauren Boswell is a 38 y o  female who has a past medical history significant for hypothyroidism, moyamoya, diabetes, history of CVA, tobacco abuse, alcohol use  Patient presenting to the ED for dizziness and worsening peripheral vision deficit  Patient has a known history of moyamoya and multiple prior strokes    Previously was on Plavix, then switch to aspirin, then eventually Aggrenox  Denies any associated facial droop, slurred speech, weakness, numbness/tingling  Stroke alert called in the ED   patient requiring medical admission for stroke pathway Neurology consultation  All patient questions answered to the best my ability  Hospital Course:     Patient was hospitalized to rule out a new stroke  She was seen by Neurology and underwent an MRI which did not reveal any new stroke  CTA did reveal incidental findings of aneurysms without rupture, they did recommend outpatient follow-up with Neurosurgery  Patient did have some leukocytosis but her review of systems was otherwise negative, she did not spike any fever during hospitalization and leukocytosis resolved  No obvious source of infection  Patient's dizziness improved with meclizine and fluids  She is still with some disequilibrium, she was seen by PT/OT and recommended rehab but she refuses  Her lipid panel did reveal elevated LDL, have statin dose was increased to 80 mg q h s     Patient being discharged home as per request on 09/01/2022  She should follow-up with Neurology 100 surgical pressure physician  Condition at Discharge: fair     Discharge Day Visit / Exam:     Subjective:    Patient evaluated this morning  States that the dizziness is better  She denies any nausea or vomiting today, able to ambulate  She has been off eredfor rehab have but refuses  Other events reported  Vitals: Blood Pressure: 167/82 (09/01/22 1209)  Pulse: 93 (09/01/22 1209)  Temperature: 98 3 °F (36 8 °C) (09/01/22 0702)  Temp Source: Oral (09/01/22 0702)  Respirations: 18 (09/01/22 0702)  Height: 5' 1" (154 9 cm) (08/31/22 1507)  Weight - Scale: 84 4 kg (186 lb) (08/31/22 1507)  SpO2: 95 % (09/01/22 1209)     Exam:   Physical Exam  Vitals and nursing note reviewed  Constitutional:       General: She is not in acute distress  Appearance: Normal appearance   She is normal weight  She is not ill-appearing, toxic-appearing or diaphoretic  HENT:      Head: Normocephalic and atraumatic  Right Ear: External ear normal       Left Ear: External ear normal       Nose: Nose normal  No congestion  Mouth/Throat:      Mouth: Mucous membranes are moist       Pharynx: Oropharynx is clear  No oropharyngeal exudate or posterior oropharyngeal erythema  Eyes:      General: No scleral icterus  Right eye: No discharge  Left eye: No discharge  Extraocular Movements: Extraocular movements intact  Conjunctiva/sclera: Conjunctivae normal       Pupils: Pupils are equal, round, and reactive to light  Cardiovascular:      Rate and Rhythm: Normal rate and regular rhythm  Pulses: Normal pulses  Heart sounds: Normal heart sounds  No murmur heard  No friction rub  No gallop  Pulmonary:      Effort: Pulmonary effort is normal  No respiratory distress  Breath sounds: Normal breath sounds  No stridor  No wheezing, rhonchi or rales  Chest:      Chest wall: No tenderness  Abdominal:      General: Abdomen is flat  Bowel sounds are normal  There is no distension  Palpations: Abdomen is soft  There is no mass  Tenderness: There is no abdominal tenderness  There is no guarding or rebound  Musculoskeletal:         General: No swelling, tenderness, deformity or signs of injury  Normal range of motion  Cervical back: Normal range of motion and neck supple  No rigidity  No muscular tenderness  Skin:     General: Skin is warm and dry  Capillary Refill: Capillary refill takes less than 2 seconds  Coloration: Skin is not jaundiced or pale  Findings: No bruising, erythema, lesion or rash  Neurological:      General: No focal deficit present  Mental Status: She is alert and oriented to person, place, and time  Mental status is at baseline  Cranial Nerves: No cranial nerve deficit  Sensory: No sensory deficit  Motor: No weakness  Coordination: Coordination normal       Gait: Gait normal       Comments: AAO x4, GCS 15, no facial mimic changes, no gross motor or sensitive deficits noted, no dysmetria   Psychiatric:         Mood and Affect: Mood normal          Behavior: Behavior normal          Thought Content: Thought content normal          Judgment: Judgment normal            Discussion with Family:  Patient herself, she is competent alert and oriented    Discharge instructions/Information to patient and family:   See after visit summary for information provided to patient and family  Provisions for Follow-Up Care:  See after visit summary for information related to follow-up care and any pertinent home health orders  Disposition:     Home    For Discharges to [facility] SNF:   · Not Applicable to this Patient - Not Applicable to this Patient    Planned Readmission: No     Discharge Statement:  I spent 45 minutes discharging the patient  This time was spent on the day of discharge  I had direct contact with the patient on the day of discharge  Greater than 50% of the total time was spent examining patient, answering all patient questions, arranging and discussing plan of care with patient as well as directly providing post-discharge instructions  Additional time then spent on discharge activities  Discharge Medications:  See after visit summary for reconciled discharge medications provided to patient and family        ** Please Note: This note has been constructed using a voice recognition system **

## 2022-09-01 NOTE — QUICK NOTE
Patient also noted to have 2 basilar tip aneurysms, largest projecting posteriorly and measuring approximately 5 mm   Recommend outpatient neurovascular follow-up with neurosurgery team

## 2022-09-01 NOTE — UTILIZATION REVIEW
Initial Clinical Review    Admission: Date/Time/Statement:   Admission Orders (From admission, onward)     Ordered        08/31/22 0116  Place in Observation  Once                      Orders Placed This Encounter   Procedures    Place in Observation     Standing Status:   Standing     Number of Occurrences:   1     Order Specific Question:   Level of Care     Answer:   Med Surg [16]     ED Arrival Information     Expected   -    Arrival   8/30/2022 23:32    Acuity   Urgent            Means of arrival   Ambulance    Escorted by   [facility]    Service   Hospitalist    Admission type   Urgent            Arrival complaint   -           Chief Complaint   Patient presents with    Dizziness     Pt arrives via EMS c/o dizziness and shakiness starting approx  1 hour ago  Pt has a hx of 'calderon calderon' disorder  Denies chest pain, N/V  Hx of stroke       Initial Presentation: 38 y o  female to the ED from home with complaints of dizziness, shakiness which started an hour prior to arrival  Admitted under observation for dizziness, abnormal CT of head, alcohol use, hypokalmia, tobacco use  H/O CVA, hypothyroidism, moyamoya, DM  Arrives with elevated WBcs, CTA head/neck shows: Redemonstrated chronic extensive changes of underlying moyamoya disease within the cervical and intracranial vasculature and associated secondary collateralization overall similar in appearance to prior study dated 8/24/2018  Stroke alert on arrival to ED: As per neurology in the ED, NIHSS 2  Has worsened visual field deficits  Check MRI brain, pt/ot  Monitor tele  Afebrile, not signs of infection  PT/OT recommends post acute rehab    8/31 NEurology consult:    MRI brain showed T2 changes suggestive of prior stroke however did not show any diffusion restriction  Symptoms appear to be recrudescence from underlying leukocytosis since infection  Continue antiplatelets, statin at home    Aggrenox be held and patient was loaded with aspirin 325 mg x 1 and and cilostazol 100 mg then continued on aspirin 81 mg daily as well as cilostazol 100 mg BID        Date: 9/1   Day 2:       ED Triage Vitals   Temperature Pulse Respirations Blood Pressure SpO2   08/31/22 0035 08/30/22 2334 08/30/22 2334 08/30/22 2334 08/30/22 2334   97 8 °F (36 6 °C) (!) 109 19 135/90 99 %      Temp Source Heart Rate Source Patient Position - Orthostatic VS BP Location FiO2 (%)   08/31/22 0035 08/30/22 2334 08/30/22 2334 08/30/22 2334 --   Oral Monitor Lying Right arm       Pain Score       08/31/22 0015       5          Wt Readings from Last 1 Encounters:   08/31/22 84 4 kg (186 lb)     Additional Vital Signs:   Date/Time Temp Pulse Resp BP MAP (mmHg) SpO2 O2 Device Patient Position - Orthostatic VS   09/01/22 07:02:05 98 3 °F (36 8 °C) 85 18 152/87 109 94 % None (Room air) Lying   09/01/22 0300 99 °F (37 2 °C) 83 16 124/73 90 -- -- --   09/01/22 02:10:05 99 °F (37 2 °C) 88 20 124/73 90 95 % -- --   08/31/22 22:25:29 99 1 °F (37 3 °C) 81 20 124/72 89 96 % -- --   08/31/22 2000 -- -- -- -- -- 93 % None (Room air) --   08/31/22 19:11:26 98 7 °F (37 1 °C) 102 20 117/64 82 94 % -- --   08/31/22 15:07:49 98 2 °F (36 8 °C) 84 18 156/87 110 96 % None (Room air) Lying   08/31/22 1300 -- 86 20 122/62 86 95 % -- --   08/31/22 1100 -- 90 18 127/73 -- 96 % -- --   08/31/22 0900 -- 91 18 136/71 97 97 % -- --   08/31/22 0700 -- 92 19 155/90 113 95 % -- --   08/31/22 0600 -- 100 18 145/92 113 97 % None (Room air) Lying   08/31/22 0530 -- 100 17 131/77 98 97 % None (Room air) Lying   08/31/22 0415 -- 101 19 119/57 82 96 % None (Room air) Lying   08/31/22 0300 -- 96 19 124/56 84 97 % None (Room air) Lying   08/31/22 0215 -- 93 18 180/81 Abnormal  117 97 % None (Room air) Lying   08/31/22 0115 -- 86 18 135/68 -- 96 % None (Room air) Lying   08/31/22 0100 -- 89 18 137/71 -- 98 % None (Room air) Lying   08/31/22 0045 -- 89 18 140/74 -- 97 % None (Room air) Lying   08/31/22 0035 97 8 °F (36 6 °C) -- -- -- -- -- -- -- 08/31/22 0030 -- 92 19 133/73 -- 97 % None (Room air) --   08/31/22 0015 -- 109 Abnormal  18 140/81 -- 98 % None (Room air) Lying   08/30/22 2334 -- 109 Abnormal  19 135/90 -- 99 % None (Room air) Lying       Pertinent Labs/Diagnostic Test Results:   8/30 EKG: Sinus tachycardia  Right atrial enlargement  Nonspecific ST abnormality  MRI brain wo contrast   Final Result by Manju Newton MD (08/31 1115)      No acute intracranial pathology  Stable chronic bilateral posterior cerebral artery distribution infarcts, right greater than left  Chronic microangiopathy  Stable findings related to known moyamoya  Workstation performed: [id number]         X-ray chest 1 view portable   Final Result by Figueroa Acosta MD (08/31 2085)      No acute cardiopulmonary disease  Workstation performed: [id number]         CTA stroke alert (head/neck)   Final Result by Scout Azar MD (08/31 0127)      Redemonstrated chronic extensive changes of underlying moyamoya disease within the cervical and intracranial vasculature and associated secondary collateralization overall similar in appearance to prior study dated 8/24/2018       2 basilar tip aneurysms as above, largest projecting posteriorly and measuring approximately 5 mm  These aneurysms were not well visualized on the prior study and demonstrate significant interval enlargement  Neuro interventional follow-up advised  Findings were directly discussed with Olga Schultz at 12:45 AM on 8/31/2022  Workstation performed: [id number]         CT stroke alert brain   Final Result by Anitha Garcia MD (08/31 0036)      No acute intracranial abnormality  MRI is more sensitive for detection of early ischemia and may be considered for further evaluation        Findings were directly discussed with Ruben Maxwell at 12:29 AM       Workstation performed: [id number]         XR chest pa & lateral    (Results Pending)     Results from last 7 days   Lab Units 08/31/22  1256   SARS-COV-2  Negative     Results from last 7 days   Lab Units 09/01/22  0525 08/31/22  0543 08/31/22  0035   WBC Thousand/uL 8 52 15 10* 15 01*   HEMOGLOBIN g/dL 9 2* 11 4* 11 1*   HEMATOCRIT % 30 8* 37 1 36 1   PLATELETS Thousands/uL 270 386 349   NEUTROS ABS Thousands/µL 4 19  --   --          Results from last 7 days   Lab Units 09/01/22  0525 08/31/22  0543 08/31/22  0035   SODIUM mmol/L 140 140 133*   POTASSIUM mmol/L 3 5 3 1* 3 3*   CHLORIDE mmol/L 107 102 95*   CO2 mmol/L 23 26 23   ANION GAP mmol/L 10 12 15*   BUN mg/dL 10 17 16   CREATININE mg/dL 0 57* 0 92 1 01   EGFR ml/min/1 73sq m 113 75 67   CALCIUM mg/dL 7 9* 9 0 8 3     Results from last 7 days   Lab Units 08/31/22  0543   AST U/L 59*   ALT U/L 102*   ALK PHOS U/L 115   TOTAL PROTEIN g/dL 6 9   ALBUMIN g/dL 3 2*   TOTAL BILIRUBIN mg/dL 0 30     Results from last 7 days   Lab Units 09/01/22  0701 08/31/22  2045 08/31/22  1538 08/31/22  1116 08/31/22  0705 08/31/22  0212 08/31/22  0020   POC GLUCOSE mg/dl 171* 245* 165* 176* 206* 150* 182*     Results from last 7 days   Lab Units 09/01/22  0525 08/31/22  0543 08/31/22  0035   GLUCOSE RANDOM mg/dL 129 202* 162*         Results from last 7 days   Lab Units 08/31/22  0543   HEMOGLOBIN A1C % 7 5*   EAG mg/dl 169         Results from last 7 days   Lab Units 08/31/22  0543 08/31/22  0323 08/31/22  0035   HS TNI 0HR ng/L  --   --  42   HS TNI 2HR ng/L  --  36  --    HSTNI D2 ng/L  --  -6  --    HS TNI 4HR ng/L 36  --   --    HSTNI D4 ng/L -6  --   --          Results from last 7 days   Lab Units 08/31/22  0035   PROTIME seconds 13 1   INR  1 01   PTT seconds 27         Results from last 7 days   Lab Units 08/31/22  1256   INFLUENZA A PCR  Negative   INFLUENZA B PCR  Negative   RSV PCR  Negative         Results from last 7 days   Lab Units 08/31/22  1413   BLOOD CULTURE  Received in Microbiology Lab  Culture in Progress  Received in Microbiology Lab   Culture in Progress       ED Treatment:   Medication Administration from 08/30/2022 2332 to 08/31/2022 1501       Date/Time Order Dose Route Action Action by Comments     08/31/2022 0045 sodium chloride 0 9 % bolus 1,000 mL 1,000 mL Intravenous New Bag Monna Shoulders, RN      08/31/2022 0044 meclizine (ANTIVERT) tablet 50 mg 50 mg Oral Given Monna Shoulders, RN      08/31/2022 0127 nicotine (NICODERM CQ) 21 mg/24 hr TD 24 hr patch 21 mg 21 mg Transdermal Medication Applied [facility], RN      08/31/2022 0126 aspirin chewable tablet 324 mg 324 mg Oral Given [facility], RN      08/31/2022 0728 cilostazol (PLETAL) tablet 100 mg 100 mg Oral Given [facility] Eriberto Engle, RN      08/31/2022 0826 thiamine tablet 100 mg 100 mg Oral Given Mini Eriberto Engle, RN      17/95/6188 7991 folic acid (FOLVITE) tablet 1 mg 1 mg Oral Given Mini Engle, RN      08/31/2022 0826 multivitamin-minerals (CENTRUM) tablet 1 tablet 1 tablet Oral Given Marie Arm, RN      08/31/2022 0612 levothyroxine tablet 150 mcg 150 mcg Oral Given Monna Shoulders, RN      08/31/2022 0827 enoxaparin (LOVENOX) subcutaneous injection 40 mg 40 mg Subcutaneous Given Mini Engle RN      08/31/2022 1140 insulin lispro (HumaLOG) 100 units/mL subcutaneous injection 1-6 Units 1 Units Subcutaneous Given Marie Arm, TARSHA      08/31/2022 0728 insulin lispro (HumaLOG) 100 units/mL subcutaneous injection 1-6 Units 2 Units Subcutaneous Given Marie Arm, RN      08/31/2022 0214 insulin lispro (HumaLOG) 100 units/mL subcutaneous injection 1-5 Units 1 Units Subcutaneous Given Monna Shoulders, RN bs 150     08/31/2022 0214 potassium chloride (K-DUR,KLOR-CON) CR tablet 40 mEq 40 mEq Oral Given Monna Shoulders, TARSHA      08/31/2022 0826 aspirin (ECOTRIN LOW STRENGTH) EC tablet 81 mg 81 mg Oral Given Mini Eriberto Engle, TARSHA      08/31/2022 0220 sodium chloride 0 9 % infusion 125 mL/hr Intravenous New Bag Bianca Selby RN      08/31/2022 6086 loperamide (IMODIUM) capsule 2 mg 2 mg Oral Given Carlton Bautista RN      08/31/2022 0547 dicyclomine (BENTYL) capsule 10 mg 10 mg Oral Given Carlton Bautista RN         Past Medical History:   Diagnosis Date    Disease of thyroid gland     History of CVA (cerebrovascular accident)     Hypertension     Moyamoya disease     Psychiatric disorder     Stroke [facility] [address] [facility] )     Tobacco use        Admitting Diagnosis: Dizziness [R42]  History of CVA (cerebrovascular accident) [Z86 73]  Age/Sex: 38 y o  female  Admission Orders:  Stool enteric bacterial panel  UA  Scheduled Medications:  aspirin, 81 mg, Oral, Daily  atorvastatin, 80 mg, Oral, QPM  cilostazol, 100 mg, Oral, BID AC  enoxaparin, 40 mg, Subcutaneous, Daily  folic acid, 1 mg, Oral, Daily  insulin lispro, 1-5 Units, Subcutaneous, HS  insulin lispro, 1-6 Units, Subcutaneous, TID AC  levothyroxine, 150 mcg, Oral, Early Morning  meclizine, 25 mg, Oral, Q8H [address]  multivitamin-minerals, 1 tablet, Oral, Daily  nicotine, 1 patch, Transdermal, Daily  thiamine, 100 mg, Oral, Daily      Continuous IV Infusions:  sodium chloride, 125 mL/hr, Intravenous, Continuous      PRN Meds:  acetaminophen, 650 mg, Oral, Q6H PRN        IP CONSULT TO CASE MANAGEMENT  IP CONSULT TO NEUROLOGY    Network Utilization Review Department  ATTENTION: Please call with any questions or concerns to [phone] and carefully listen to the prompts so that you are directed to the right person  All voicemails are confidential   Clemonica Rushing all requests for admission clinical reviews, approved or denied determinations and any other requests to dedicated fax number below belonging to the campus where the patient is receiving treatment   List of dedicated fax numbers for the Facilities:  [address] DENIALS (Administrative/Medical Necessity) [phone]   [address] (Maternity/NICU/Pediatrics) [phone]   5000 [facility] [place] [phone]   8049 [facility] [phone]   [address] [facility] [place] [address] [address] [place] [address] [address] [phone]   [address] [phone]

## 2022-09-01 NOTE — CASE MANAGEMENT
Case Management Discharge Planning Note    Patient name Margarette Grant  Location [address] 212/-01 MRN [id number]  : 1980 Date 2022       Current Admission Date: 2022  Current Admission Diagnosis:Dizziness   Patient Active Problem List    Diagnosis Date Noted    Hypokalemia 2022    Alcohol use 2022    Diabetes ([facility] [address] ) 2022    Abnormal CT of the head 2022    [facility] with [facility] 2018    Acute CVA (cerebrovascular accident) [facility] ) 2018    Blurry vision, left eye 2018    Dizziness 2018    Miscarriage within last 12 months 2018    Syncope 2018    Homeless 2018    Hypothyroidism 10/09/2017    Psychosocial problem 10/09/2017    History of CVA (cerebrovascular accident) 10/07/2017    Tobacco use 10/07/2017    Moyamoya disease 10/07/2017    Leukocytosis 10/07/2017      LOS (days): 0  Geometric Mean LOS (GMLOS) (days):   Days to GMLOS:     OBJECTIVE:      Current admission status: Observation   Preferred Pharmacy:   [id number] [address] - [address]  [address] [address]  Phone: [phone] Fax: [phone]    Primary Care Provider: Lori Gonzalez MD    Primary Insurance: [facility]  Secondary Insurance: Scarlet Cough    DISCHARGE DETAILS:    Discharge planning discussed with[de-identified] Patient  Freedom of Choice: Yes  Comments - Freedom of Choice: CM met with patient at bedside  Patient declining inpatient placement  Agreeable to [address] referral   [address] accepted  Patient agreeable    CM contacted family/caregiver?: No- see comments (Patient declined )  Were Treatment Team discharge recommendations reviewed with patient/caregiver?: Yes  Did patient/caregiver verbalize understanding of patient care needs?: Yes  Were patient/caregiver advised of the risks associated with not following Treatment Team discharge recommendations?: Yes    Contacts  Patient Contacts: Patient  Relationship to Patient[de-identified] Other (Comment) (Self)  Contact Method: In Person  Reason/Outcome: Discharge [address]         Is the patient interested in [address] at discharge?: Yes  [address] requested[de-identified] Occupational Therapy, Physical [address] Name[de-identified] [facility] Provider[de-identified] PCP  Home Health Services Needed[de-identified] Evaluate Functional Status and Safety, Strengthening/Theraputic Exercises to Improve Function  Homebound Criteria Met[de-identified] Requires the Assistance of Another Person for Safe Ambulation or to Leave the Home, Uses an Assist Device (i e  cane, walker, etc)  Supporting Clincal Findings[de-identified] Limited Endurance, Fatigues Easliy in [employer]    DME Referral Provided  Referral made for DME?: No    Other Referral/Resources/Interventions Provided:  Interventions: [facility]  Referral Comments: [address] reserved for [address] in [address]      Would you like to participate in our [address] service program?  : No - Declined    Discharge Destination Plan[de-identified] Home with [address] [facility] of Transport (Date): 09/01/22

## 2022-09-01 NOTE — DISCHARGE INSTR - AVS FIRST PAGE
Take atorvastatin 80 mg at night    You may take meclizine as needed for dizziness up to 3 times a day    You may take Zofran for nausea    Follow-up with her primary care physician    Follow up with neurosurgery

## 2022-09-01 NOTE — ARC ADMISSION
After review with [address] [place] [address], patient is denied for ARC  Physician does not feel patient has anything acute going on that justifies medical necessity needed for acute rehab

## 2022-09-01 NOTE — CASE MANAGEMENT
Case Management Assessment & Discharge Planning Note    Patient name Peri Swift  Location [address] 212/-01 MRN [id number]  : 1980 Date 2022       Current Admission Date: 2022  Current Admission Diagnosis:Dizziness   Patient Active Problem List    Diagnosis Date Noted    Hypokalemia 2022    Alcohol use 2022    Diabetes ([facility] ) 2022    Abnormal CT of the head 2022    Major depression with psychotic features ([facility] [facility] ) 2018    Acute CVA (cerebrovascular accident) ([facility] ) 2018    Blurry vision, left eye 2018    Dizziness 2018    Miscarriage within last 12 months 2018    Syncope 2018    Homeless 2018    Hypothyroidism 10/09/2017    Psychosocial problem 10/09/2017    History of CVA (cerebrovascular accident) 10/07/2017    Tobacco use 10/07/2017    Moyamoya disease 10/07/2017    Leukocytosis 10/07/2017      LOS (days): 0  Geometric Mean LOS (GMLOS) (days):   Days to GMLOS:     OBJECTIVE:              Current admission status: Observation       Preferred Pharmacy:   [address] [place] [address] [address] [place] [address] [address]  [facility] [address]  Phone: [phone] Fax: [phone]    Primary Care Provider: Marbin Matthews MD    Primary Insurance: [facility]  Secondary Insurance: [address] [place] [address]    ASSESSMENT:  [address]  Representative - Significant Other   Primary Phone: [phone] (Home)               Advance Directives  Does patient have a [address]?: No  Was patient offered paperwork?: Yes (Patient declined )  Does patient currently have a Health Care decision maker?: Yes, please see Health Care Proxy section  Does patient have Advance Directives?: No  Was patient offered paperwork?: Yes (Patient declined )  Primary Contact: Patient's     Readmission Root Cause  30 Day Readmission: No    Patient Information  Admitted from[de-identified] Home  Mental Status: Alert  During Assessment patient was accompanied by: Not accompanied during assessment  Assessment information provided by[de-identified] Patient  Primary Caregiver: Self  Support Systems: Spouse/significant other, Friends/neighbors  [place] of Residence: [place] [address] do you live in?:  [address] entry access options   Select all that apply : Stairs  Number of steps to enter home : One Flight  Do the steps have railings?: Yes  Type of Current Residence: Apartment  Floor Level: 2  Upon entering residence, is there a bedroom on the main floor (no further steps)?: No  A bedroom is located on the following floor levels of residence (select all that apply):: 2nd Floor  Upon entering residence, is there a bathroom on the main floor (no further steps)?: No  Indicate which floors of current residence have a bathroom (select all the apply):: 2nd Floor  Number of steps to 2nd floor from main floor: One Flight  In the last 12 months, was there a time when you were not able to pay the mortgage or rent on time?: No  In the last 12 months, how many places have you lived?: 1  In the last 12 months, was there a time when you did not have a steady place to sleep or slept in a shelter (including now)?: No  Homeless/housing insecurity resource given?: N/A  Living Arrangements: Lives w/ Friend  Is patient a ?: No    Activities of Daily Living Prior to Admission  Functional Status: Independent  Completes ADLs independently?: Yes  Ambulates independently?: Yes  Does patient use assisted devices?: No  Does patient currently own DME?: No  Does patient have a history of Outpatient Therapy (PT/OT)?: No  Does the patient have a history of Short-Term Rehab?: No  Does patient have a history of HHC?: No  Does patient currently have [address]?: No    Patient Information Continued  Income Source: Unemployed  Does patient have prescription coverage?: Yes  Within the past 12 months, you worried that your food would run out before you got the money to buy more : Never true  Within the past 12 months, the food you bought just didn't last and you didn't have money to get more : Never true  Food insecurity resource given?: N/A  Does patient receive dialysis treatments?: No  Does patient have a history of substance abuse?: Yes  Historical substance use preference: Alcohol/ETOH  History of Withdrawal Symptoms: Denies past symptoms  Is patient currently in treatment for substance abuse?: N/A - sober  Does patient have a history of Mental Health Diagnosis?: No    PHQ 2/9 Screening   Reviewed PHQ 2/9 Depression Screening Score?: No    Means of Transportation  Means of Transport to Appts[de-identified] Other (Comment) (Uses [employer])  In the past 12 months, has lack of transportation kept you from medical appointments or from getting medications?: No  In the past 12 months, has lack of transportation kept you from meetings, work, or from getting things needed for daily living?: No  Was application for public transport provided?: N/A    DISCHARGE DETAILS:    Discharge planning discussed with[de-identified] Patient  Freedom of Choice: Yes  Comments - Freedom of Choice: CM reviewed STR recommendation with patient  Patient agreeable to go to acute or subacute facilities and denied any preferences    CM contacted family/caregiver?: No- see comments (Patient declined )  Were Treatment Team discharge recommendations reviewed with patient/caregiver?: Yes  Did patient/caregiver verbalize understanding of patient care needs?: Yes  Were patient/caregiver advised of the risks associated with not following Treatment Team discharge recommendations?: Yes    [address]         Is the patient interested in [facility] at discharge?: No    DME Referral Provided  Referral made for DME?: No    Other Referral/Resources/Interventions Provided:  Interventions: Acute Rehab, Short Term Rehab  Referral Comments: CM sent blanket referrals via Jerrica  Would you like to participate in our [address] service program?  : No - Declined    Treatment Team Recommendation: Acute Rehab, Short Term Rehab  Discharge Destination Plan[de-identified] Acute Rehab, Short Term Rehab  Transport at Discharge : S Ambulance  Dispatcher Contacted:  No

## 2022-09-01 NOTE — PLAN OF CARE
Problem: MOBILITY - ADULT  Goal: Maintain or return to baseline ADL function  Description: INTERVENTIONS:  -  Assess patient's ability to carry out ADLs; assess patient's baseline for ADL function and identify physical deficits which impact ability to perform ADLs (bathing, care of mouth/teeth, toileting, grooming, dressing, etc )  - Assess/evaluate cause of self-care deficits   - Assess range of motion  - Assess patient's mobility; develop plan if impaired  - Assess patient's need for assistive devices and provide as appropriate  - Encourage maximum independence but intervene and supervise when necessary  - Involve family in performance of ADLs  - Assess for home care needs following discharge   - Consider OT consult to assist with ADL evaluation and planning for discharge  - Provide patient education as appropriate  9/1/2022 1207 by Kenny Perez RN  Outcome: Progressing  9/1/2022 1207 by Kenny Perez RN  Outcome: Progressing  Goal: Maintains/Returns to pre admission functional level  Description: INTERVENTIONS:  - Perform BMAT or MOVE assessment daily    - Set and communicate daily mobility goal to care team and patient/family/caregiver  - Collaborate with rehabilitation services on mobility goals if consulted  - Perform Range of Motion  times a day  - Reposition patient every  hours    - Dangle patient  times a day  - Stand patient  times a day  - Ambulate patient  times a day  - Out of bed to chair  times a day   - Out of bed for meals times a day  - Out of bed for toileting  - Record patient progress and toleration of activity level   9/1/2022 1207 by Kenny Perez RN  Outcome: Progressing  9/1/2022 1207 by Kenny Perez RN  Outcome: Progressing     Problem: PAIN - ADULT  Goal: Verbalizes/displays adequate comfort level or baseline comfort level  Description: Interventions:  - Encourage patient to monitor pain and request assistance  - Assess pain using appropriate pain scale  - Administer analgesics based on type and severity of pain and evaluate response  - Implement non-pharmacological measures as appropriate and evaluate response  - Consider cultural and social influences on pain and pain management  - Notify physician/advanced practitioner if interventions unsuccessful or patient reports new pain  9/1/2022 1207 by Eri Mcgregor RN  Outcome: Progressing  9/1/2022 1207 by Eri Mcgregor RN  Outcome: Progressing     Problem: INFECTION - ADULT  Goal: Absence or prevention of progression during hospitalization  Description: INTERVENTIONS:  - Assess and monitor for signs and symptoms of infection  - Monitor lab/diagnostic results  - Monitor all insertion sites, i e  indwelling lines, tubes, and drains  - Monitor endotracheal if appropriate and nasal secretions for changes in amount and color  - [place] appropriate cooling/warming therapies per order  - Administer medications as ordered  - Instruct and encourage patient and family to use good hand hygiene technique  - Identify and instruct in appropriate isolation precautions for identified infection/condition  9/1/2022 1207 by Eri Mcgregor RN  Outcome: Progressing  9/1/2022 1207 by Eri Mcgregor RN  Outcome: Progressing  Goal: Absence of fever/infection during neutropenic period  Description: INTERVENTIONS:  - Monitor WBC    9/1/2022 1207 by Eri Mcgregor RN  Outcome: Progressing  9/1/2022 1207 by Eri Mcgregor RN  Outcome: Progressing     Problem: SAFETY ADULT  Goal: Maintain or return to baseline ADL function  Description: INTERVENTIONS:  -  Assess patient's ability to carry out ADLs; assess patient's baseline for ADL function and identify physical deficits which impact ability to perform ADLs (bathing, care of mouth/teeth, toileting, grooming, dressing, etc )  - Assess/evaluate cause of self-care deficits   - Assess range of motion  - Assess patient's mobility; develop plan if impaired  - Assess patient's need for assistive devices and provide as appropriate  - Encourage maximum independence but intervene and supervise when necessary  - Involve family in performance of ADLs  - Assess for home care needs following discharge   - Consider OT consult to assist with ADL evaluation and planning for discharge  - Provide patient education as appropriate  9/1/2022 1207 by Marina Martin RN  Outcome: Progressing  9/1/2022 1207 by Marina Martin RN  Outcome: Progressing  Goal: Maintains/Returns to pre admission functional level  Description: INTERVENTIONS:  - Perform BMAT or MOVE assessment daily    - Set and communicate daily mobility goal to care team and patient/family/caregiver  - Collaborate with rehabilitation services on mobility goals if consulted  - Perform Range of Motion  times a day  - Reposition patient every  hours    - Dangle patient  times a day  - Stand patient  times a day  - Ambulate patient  times a day  - Out of bed to chair  times a day   - Out of bed for meals  times a day  - Out of bed for toileting  - Record patient progress and toleration of activity level   9/1/2022 1207 by Marina Martin RN  Outcome: Progressing  9/1/2022 1207 by Marina Martin RN  Outcome: Progressing  Goal: Patient will remain free of falls  Description: INTERVENTIONS:  - Educate patient/family on patient safety including physical limitations  - Instruct patient to call for assistance with activity   - Consult OT/PT to assist with strengthening/mobility   - Keep Call bell within reach  - Keep bed low and locked with side rails adjusted as appropriate  - Keep care items and personal belongings within reach  - Initiate and maintain comfort rounds  - Make Fall Risk Sign visible to staff  - Offer Toileting every Hours, in advance of need  - Initiate/Maintain alarm  - Obtain necessary fall risk management equipment:   - Apply yellow socks and bracelet for high fall risk patients  - Consider moving patient to room near nurses station  9/1/2022 1207 by Marina Martin RN  Outcome: Progressing  9/1/2022 1207 by Razia Marcelino RN  Outcome: Progressing     Problem: DISCHARGE PLANNING  Goal: Discharge to home or other facility with appropriate resources  Description: INTERVENTIONS:  - Identify barriers to discharge w/patient and caregiver  - Arrange for needed discharge resources and transportation as appropriate  - Identify discharge learning needs (meds, wound care, etc )  - Arrange for interpretive services to assist at discharge as needed  - Refer to Case Management Department for coordinating discharge planning if the patient needs post-hospital services based on physician/advanced practitioner order or complex needs related to functional status, cognitive ability, or social support system  Outcome: Progressing     Problem: Knowledge Deficit  Goal: Patient/family/caregiver demonstrates understanding of disease process, treatment plan, medications, and discharge instructions  Description: Complete learning assessment and assess knowledge base    Interventions:  - Provide teaching at level of understanding  - Provide teaching via preferred learning methods  Outcome: Progressing     Problem: Potential for Falls  Goal: Patient will remain free of falls  Description: INTERVENTIONS:  - Educate patient/family on patient safety including physical limitations  - Instruct patient to call for assistance with activity   - Consult OT/PT to assist with strengthening/mobility   - Keep Call bell within reach  - Keep bed low and locked with side rails adjusted as appropriate  - Keep care items and personal belongings within reach  - Initiate and maintain comfort rounds  - Make Fall Risk Sign visible to staff  - Offer Toileting every  Hours, in advance of need  - Initiate/Maintain alarm  - Obtain necessary fall risk management equipment  - Apply yellow socks and bracelet for high fall risk patients  - Consider moving patient to room near nurses station  9/1/2022 1207 by Razia Marcelino RN  Outcome: Progressing  9/1/2022 1207 by Jessa Menchaca RN  Outcome: Progressing

## 2022-09-02 ENCOUNTER — TRANSCRIBE ORDERS (OUTPATIENT)
Dept: HOME HEALTH SERVICES | Facility: HOME HEALTHCARE | Age: 42
End: 2022-09-02

## 2022-09-02 ENCOUNTER — HOME CARE VISIT (OUTPATIENT)
Dept: HOME HEALTH SERVICES | Facility: HOME HEALTHCARE | Age: 42
End: 2022-09-02

## 2022-09-02 DIAGNOSIS — I67.5 MOYAMOYA DISEASE: Primary | ICD-10-CM

## 2022-09-02 NOTE — CASE COMMUNICATION
Multiple attempts to contact patient regarding home physical therapy referral  Unable to contact patient  Text message sent to alternate phone number with no response  PT will attempt to schedule a [facility] visit for 9/6/22  New SOC date will be 9/6/22

## 2022-09-04 LAB
BACTERIA BLD CULT: NORMAL
BACTERIA BLD CULT: NORMAL

## 2022-09-05 ENCOUNTER — HOME CARE VISIT (OUTPATIENT)
Dept: HOME HEALTH SERVICES | Facility: HOME HEALTHCARE | Age: 42
End: 2022-09-05

## 2022-09-05 LAB
BACTERIA BLD CULT: NORMAL
BACTERIA BLD CULT: NORMAL

## 2022-09-06 ENCOUNTER — HOME CARE VISIT (OUTPATIENT)
Dept: HOME HEALTH SERVICES | Facility: HOME HEALTHCARE | Age: 42
End: 2022-09-06

## 2022-09-07 ENCOUNTER — HOME CARE VISIT (OUTPATIENT)
Dept: HOME HEALTH SERVICES | Facility: HOME HEALTHCARE | Age: 42
End: 2022-09-07

## 2022-09-07 NOTE — CASE COMMUNICATION
Unable to  contact Pt by phone number in chart due to nonoperational number  Multiple VM left for Pt significant other in attempts to contact Pt  Call placed to PCP listed in chart who report Pt is not an active patient  Drive by to pt address performed on 9/7/22  Unable to locate Pt for PT SOC

## 2023-01-01 NOTE — ED NOTES
Pt reviewed at [facility] for in network placement  At time time they do not feel she is appropriate for the younger adult unit due to her medical needs; they will be able to review for older adult tomorrow if bed is still needed       CHRIST Lewis  09/26/18   0181 DISCHARGE

## 2023-04-28 NOTE — ED NOTES
Patient appears to be resting on the bed with the lights off  Patient asked for a turkey sandwich which is sitting on the chair  Will continue to monitor         [facility]  09/26/18 2121 Transfer of Care Notification    Handover given to: Zahraa Puga, RN  Handover reason: ED MSSP program completed    Relayed pertinent information/interventions related to case for continued support.    Date of last patient contact: 4/28 - left voicemail

## 2023-08-23 ENCOUNTER — HOSPITAL ENCOUNTER (EMERGENCY)
Facility: HOSPITAL | Age: 43
Discharge: HOME | End: 2023-08-23
Attending: EMERGENCY MEDICINE | Admitting: EMERGENCY MEDICINE
Payer: COMMERCIAL

## 2023-08-23 VITALS
SYSTOLIC BLOOD PRESSURE: 204 MMHG | RESPIRATION RATE: 18 BRPM | DIASTOLIC BLOOD PRESSURE: 94 MMHG | HEART RATE: 85 BPM | OXYGEN SATURATION: 100 % | TEMPERATURE: 98.2 F

## 2023-08-23 DIAGNOSIS — Z59.00 HOMELESSNESS: Primary | ICD-10-CM

## 2023-08-23 PROCEDURE — 99283 EMERGENCY DEPT VISIT LOW MDM: CPT

## 2023-08-23 PROCEDURE — 99283 EMERGENCY DEPT VISIT LOW MDM: CPT | Performed by: EMERGENCY MEDICINE

## 2023-08-23 SDOH — ECONOMIC STABILITY - HOUSING INSECURITY: HOMELESSNESS UNSPECIFIED: Z59.00

## 2023-08-24 NOTE — ED PROVIDER NOTES
History  Chief Complaint   Patient presents with   • Homeless     Pt presents after not sleeping last night because she has no place to go, walked about 10 miles, c/o pain in bilateral legs. Unable to get in contact with shelters, pt worried for heat stroke     38 yo female who is homeless. She came to ED after she missed her intake appt with the [facility] homeless shelter this morning because she went to [facility] to look for a phone charge. She complains of b/l leg soreness after walking a lot yesterday, otherwise she has no medical concerns. Prior to Admission Medications   Prescriptions Last Dose Informant Patient Reported? Taking?   aspirin 325 mg tablet   No No   Sig: Take 1 tablet (325 mg total) by mouth daily   Patient not taking: Reported on 8/31/2022   atorvastatin (LIPITOR) 80 mg tablet   No No   Sig: Take 1 tablet (80 mg total) by mouth every evening   levothyroxine 137 mcg tablet   No No   Sig: Take 1 tablet (137 mcg total) by mouth daily in the early morning   meclizine (ANTIVERT) 25 mg tablet   No No   Sig: Take 1 tablet (25 mg total) by mouth every 8 (eight) hours as needed for dizziness   ondansetron (ZOFRAN) 4 mg tablet   No No   Sig: Take 1 tablet (4 mg total) by mouth every 8 (eight) hours as needed for nausea or vomiting      Facility-Administered Medications: None       Past Medical History:   Diagnosis Date   • Disease of thyroid gland    • History of CVA (cerebrovascular accident)    • Hypertension    • Moyamoya disease    • Psychiatric disorder    • Stroke ([address])    • Tobacco use        Past Surgical History:   Procedure Laterality Date   • BRAIN SURGERY     • BRAIN SURGERY  04/10/2017    moyamoya       Family History   Problem Relation Age of Onset   • Depression Mother    • Heart disease Father    • Hypertension Father    • Diabetes Father    • Breast cancer Maternal Grandmother      I have reviewed and agree with the history as documented.     E-Cigarette/Vaping E-Cigarette/Vaping Substances     Social History     Tobacco Use   • Smoking status: Some Days     Packs/day: 0.50     Types: Cigarettes   • Smokeless tobacco: Never   Substance Use Topics   • Alcohol use: No   • Drug use: No       Review of Systems   Constitutional: Negative for chills and fever. Respiratory: Negative for chest tightness and shortness of breath. Cardiovascular: Negative for chest pain and leg swelling. Neurological: Negative for dizziness, tremors, seizures, syncope, facial asymmetry, speech difficulty, weakness, light-headedness, numbness and headaches. All other systems reviewed and are negative. Physical Exam  Physical Exam  Vitals and nursing note reviewed. Constitutional:       General: She is not in acute distress. Appearance: Normal appearance. She is well-developed. She is not ill-appearing, toxic-appearing or diaphoretic. HENT:      Head: Normocephalic and atraumatic. Eyes:      Conjunctiva/sclera: Conjunctivae normal.      Pupils: Pupils are equal, round, and reactive to light. Neck:      Vascular: No JVD. Cardiovascular:      Rate and Rhythm: Normal rate and regular rhythm. Pulses: Normal pulses. Heart sounds: Normal heart sounds. Pulmonary:      Effort: Pulmonary effort is normal.      Breath sounds: Normal breath sounds. No stridor. Abdominal:      General: There is no distension. Palpations: Abdomen is soft. Tenderness: There is no abdominal tenderness. There is no guarding or rebound. Musculoskeletal:         General: No tenderness or deformity. Normal range of motion. Cervical back: Normal range of motion and neck supple. Skin:     General: Skin is warm and dry. Capillary Refill: Capillary refill takes less than 2 seconds. Coloration: Skin is not jaundiced or pale. Findings: No bruising, erythema, lesion or rash. Neurological:      General: No focal deficit present.       Mental Status: She is alert and oriented to person, place, and time. Cranial Nerves: No cranial nerve deficit. Sensory: No sensory deficit. Motor: No weakness or abnormal muscle tone. Coordination: Coordination normal.      Gait: Gait normal.         Vital Signs  ED Triage Vitals [08/23/23 1814]   Temperature Pulse Respirations Blood Pressure SpO2   98.2 °F (36.8 °C) 85 18 (!) 204/94 100 %      Temp Source Heart Rate Source Patient Position - Orthostatic VS BP Location FiO2 (%)   Oral Monitor Sitting Right arm --      Pain Score       --           Vitals:    08/23/23 1814   BP: (!) 204/94   Pulse: 85   Patient Position - Orthostatic VS: Sitting         Visual Acuity      ED Medications  Medications - No data to display    Diagnostic Studies  Results Reviewed     None                 No orders to display              Procedures  Procedures         ED Course                               SBIRT 22yo+    Flowsheet Row Most Recent Value   Initial Alcohol Screen: US AUDIT-C     1. How often do you have a drink containing alcohol? 0 Filed at: 08/23/2023 1814   2. How many drinks containing alcohol do you have on a typical day you are drinking? 0 Filed at: 08/23/2023 1814   3a. Male UNDER 65: How often do you have five or more drinks on one occasion? 0 Filed at: 08/23/2023 1814   3b. FEMALE Any Age, or MALE 65+: How often do you have 4 or more drinks on one occassion? 0 Filed at: 08/23/2023 1814   Audit-C Score 0 Filed at: 08/23/2023 1814   KATHRYN: How many times in the past year have you. .. Used an illegal drug or used a prescription medication for non-medical reasons?  Never Filed at: 08/23/2023 1814                    MDM    Disposition  Final diagnoses:   Homelessness     Time reflects when diagnosis was documented in both MDM as applicable and the Disposition within this note     Time User Action Codes Description Comment    8/23/2023  6:53 PM Chirag Osuna Add [Z59.00] Homelessness       ED Disposition     ED Disposition Discharge    Condition   Stable    Date/Time   Wed Aug 23, 2023  6:53 PM    Comment   Andrea Denver discharge to home/self care. Follow-up Information    None         Discharge Medication List as of 8/23/2023  6:53 PM      CONTINUE these medications which have NOT CHANGED    Details   aspirin 325 mg tablet Take 1 tablet (325 mg total) by mouth daily, Starting Fri 10/12/2018, Print      atorvastatin (LIPITOR) 80 mg tablet Take 1 tablet (80 mg total) by mouth every evening, Starting Thu 9/1/2022, Normal      levothyroxine 137 mcg tablet Take 1 tablet (137 mcg total) by mouth daily in the early morning, Starting Fri 10/12/2018, Print      meclizine (ANTIVERT) 25 mg tablet Take 1 tablet (25 mg total) by mouth every 8 (eight) hours as needed for dizziness, Starting u 9/1/2022, Normal      ondansetron (ZOFRAN) 4 mg tablet Take 1 tablet (4 mg total) by mouth every 8 (eight) hours as needed for nausea or vomiting, Starting Thu 9/1/2022, Normal             No discharge procedures on file.     PDMP Review     None          ED Provider  Electronically Signed by           Deborah Hernandez MD  08/24/23 0003

## 2025-03-09 ENCOUNTER — HOSPITAL ENCOUNTER (EMERGENCY)
Facility: HOSPITAL | Age: 45
Discharge: HOME | End: 2025-03-09
Attending: EMERGENCY MEDICINE
Payer: COMMERCIAL

## 2025-03-09 VITALS
HEIGHT: 61 IN | SYSTOLIC BLOOD PRESSURE: 138 MMHG | BODY MASS INDEX: 31.05 KG/M2 | HEART RATE: 82 BPM | WEIGHT: 164.46 LBS | RESPIRATION RATE: 20 BRPM | DIASTOLIC BLOOD PRESSURE: 73 MMHG | TEMPERATURE: 97.6 F | OXYGEN SATURATION: 98 %

## 2025-03-09 DIAGNOSIS — M79.605 PAIN IN BOTH LOWER EXTREMITIES: Primary | ICD-10-CM

## 2025-03-09 DIAGNOSIS — M79.604 PAIN IN BOTH LOWER EXTREMITIES: Primary | ICD-10-CM

## 2025-03-09 PROCEDURE — 99282 EMERGENCY DEPT VISIT SF MDM: CPT

## 2025-03-09 PROCEDURE — 99283 EMERGENCY DEPT VISIT LOW MDM: CPT | Performed by: EMERGENCY MEDICINE

## 2025-03-09 NOTE — ED PROVIDER NOTES
Time reflects when diagnosis was documented in both MDM as applicable and the Disposition within this note       Time User Action Codes Description Comment    3/9/2025  2:12 PM Julián Virk Add [M79.604,  M79.605] Pain in both lower extremities           ED Disposition       ED Disposition   Discharge    Condition   Stable    Date/Time   Sun Mar 9, 2025  2:12 PM    Comment   Sarika Galarza discharge to home/self care.                   Assessment & Plan       Medical Decision Making  44-year-old female, presents by EMS with leg pain.  Patient states she was out walking, got lost and became anxious, states her legs were hurting from walking.  Patient feels better in ED, denies any current pain.  Patient ambulating in ED without difficulty, bilateral lower extremities with no swelling or tenderness.  Patient requesting to be discharged.             Medications - No data to display    ED Risk Strat Scores                            SBIRT 20yo+      Flowsheet Row Most Recent Value   Initial Alcohol Screen: US AUDIT-C     1. How often do you have a drink containing alcohol? 0 Filed at: 03/09/2025 1304   2. How many drinks containing alcohol do you have on a typical day you are drinking?  0 Filed at: 03/09/2025 1304   3b. FEMALE Any Age, or MALE 65+: How often do you have 4 or more drinks on one occassion? 0 Filed at: 03/09/2025 1304   Audit-C Score 0 Filed at: 03/09/2025 1304   KATHRYN: How many times in the past year have you...    Used an illegal drug or used a prescription medication for non-medical reasons? Never Filed at: 03/09/2025 1304                            History of Present Illness       Chief Complaint   Patient presents with    Leg Pain     Pt c/o bilateral leg pain after walking approx 90 minutes, pt also c/o lower back pain        Past Medical History:   Diagnosis Date    Disease of thyroid gland     History of CVA (cerebrovascular accident)     Hypertension     Moyamoya disease     Psychiatric  disorder     Stroke (HCC)     Tobacco use       Past Surgical History:   Procedure Laterality Date    BRAIN SURGERY      BRAIN SURGERY  04/10/2017    tray      Family History   Problem Relation Age of Onset    Depression Mother     Heart disease Father     Hypertension Father     Diabetes Father     Breast cancer Maternal Grandmother       Social History     Tobacco Use    Smoking status: Some Days     Current packs/day: 0.50     Types: Cigarettes    Smokeless tobacco: Never   Substance Use Topics    Alcohol use: No    Drug use: No      E-Cigarette/Vaping      E-Cigarette/Vaping Substances      I have reviewed and agree with the history as documented.     44-year-old female, presents by EMS for leg pain after walking.  Patient states she was out walking, became lost was unsure where she was going, her leg started her and she became anxious and called EMS.  Patient states she is now feeling better, has no current complaints.  Denies any fall or injury.      History provided by:  Patient   used: No    Leg Pain  Associated symptoms: no fever        Review of Systems   Constitutional: Negative.  Negative for fever.   Musculoskeletal:  Positive for myalgias.   Neurological: Negative.            Objective       ED Triage Vitals [03/09/25 1302]   Temperature Pulse Blood Pressure Respirations SpO2 Patient Position - Orthostatic VS   97.6 °F (36.4 °C) 82 138/73 20 98 % Sitting      Temp Source Heart Rate Source BP Location FiO2 (%) Pain Score    Temporal Monitor Left arm -- --      Vitals      Date and Time Temp Pulse SpO2 Resp BP Pain Score FACES Pain Rating User   03/09/25 1302 97.6 °F (36.4 °C) 82 98 % 20 138/73 -- -- [place]            Physical Exam  Vitals and nursing note reviewed.   Constitutional:       General: She is not in acute distress.  HENT:      Head: Normocephalic and atraumatic.   Cardiovascular:      Rate and Rhythm: Normal rate.   Pulmonary:      Effort: Pulmonary effort is normal.    Musculoskeletal:         General: No swelling or tenderness. Normal range of motion.   Skin:     General: Skin is warm and dry.   Neurological:      General: No focal deficit present.      Mental Status: She is alert and oriented to person, place, and time.      Motor: No weakness.      Gait: Gait normal.         Results Reviewed       None            No orders to display       Procedures    ED Medication and Procedure Management   Prior to Admission Medications   Prescriptions Last Dose Informant Patient Reported? Taking?   aspirin 325 mg tablet   No No   Sig: Take 1 tablet (325 mg total) by mouth daily   Patient not taking: Reported on 8/31/2022   atorvastatin (LIPITOR) 80 mg tablet   No No   Sig: Take 1 tablet (80 mg total) by mouth every evening   levothyroxine 137 mcg tablet   No No   Sig: Take 1 tablet (137 mcg total) by mouth daily in the early morning   meclizine (ANTIVERT) 25 mg tablet   No No   Sig: Take 1 tablet (25 mg total) by mouth every 8 (eight) hours as needed for dizziness   ondansetron (ZOFRAN) 4 mg tablet   No No   Sig: Take 1 tablet (4 mg total) by mouth every 8 (eight) hours as needed for nausea or vomiting      Facility-Administered Medications: None     Discharge Medication List as of 3/9/2025  2:13 PM        CONTINUE these medications which have NOT CHANGED    Details   aspirin 325 mg tablet Take 1 tablet (325 mg total) by mouth daily, Starting Fri 10/12/2018, Print      atorvastatin (LIPITOR) 80 mg tablet Take 1 tablet (80 mg total) by mouth every evening, Starting Thu 9/1/2022, Normal      levothyroxine 137 mcg tablet Take 1 tablet (137 mcg total) by mouth daily in the early morning, Starting Fri 10/12/2018, Print      meclizine (ANTIVERT) 25 mg tablet Take 1 tablet (25 mg total) by mouth every 8 (eight) hours as needed for dizziness, Starting Thu 9/1/2022, Normal      ondansetron (ZOFRAN) 4 mg tablet Take 1 tablet (4 mg total) by mouth every 8 (eight) hours as needed for nausea or  vomiting, Starting Thu 9/1/2022, Normal           No discharge procedures on file.  ED SEPSIS DOCUMENTATION   Time reflects when diagnosis was documented in both MDM as applicable and the Disposition within this note       Time User Action Codes Description Comment    3/9/2025  2:12 PM Julián Virk Add [M79.604,  M79.605] Pain in both lower extremities                  Julián Virk MD  03/09/25 4676

## 2025-03-09 NOTE — DISCHARGE INSTRUCTIONS
"Patient Education     Muscle and bone pain - Discharge instructions   The Basics   Written by the doctors and editors at [facility]   What are discharge instructions? -- Discharge instructions are information about how to take care of yourself after getting medical care for a health problem.  What are muscle and bone pain? -- Muscle and bone pain are common symptoms. But it can be hard for doctors to tell exactly where the pain is coming from. You might hear this kind of pain called \"musculoskeletal\" pain.   Muscle pain is more common than bone pain. It often happens when you are using a muscle and gets better when you rest. The pain might come and go with activity. Muscle pain can be in just 1 area, over a larger part of your body, or in your whole body.   Bone pain can often be felt even when you are not using that part your body. It is often felt deeper within the body and can last a longer time. With bone pain, it might be easier to point to a specific area that hurts.  Lots of different things can cause these types of pain. For example, an injury can cause muscle or bone pain that happens suddenly. Chronic illnesses like arthritis can cause pain that gets worse over time. Many different health problems can cause muscle or bone pain, too.  How do I care for myself at home? -- Ask the doctor or nurse what you should do when you go home. Make sure that you understand exactly what you need to do to care for yourself. Ask questions if there is anything you do not understand.  Your care and treatment will depend on the likely cause of your pain. For example, your doctor or nurse might suggest that you:   Avoid or stop activities that causes you pain. Some kinds of muscle pain are caused by using a muscle in the same way over and over. You might need to stop or limit this activity to let your body heal.   Take non-prescription medicines to relieve pain. Examples include acetaminophen (sample brand name: Tylenol), " ibuprofen (sample brand names: Advil, Motrin), or naproxen (sample brand name: Aleve).   Use a splint, brace, or elastic bandage to let the injured area rest and heal.   Use ice or heat to help with pain:   Ice - Put a cold gel pack, bag of ice, or bag of frozen vegetables on the painful area every 1 to 2 hours, for 15 minutes each time. Put a thin towel between the ice (or another cold object) and your skin.   Heat - If it helps, use heat on the painful area for short periods of time. Put a heating pad (on the low setting) on the area for 20 minutes at a time a few times each day. Never go to sleep with a heating pad on as this can cause burns.   Do exercises to stretch and strengthen your muscles. They might also suggest improving your posture and form. This can limit the stress and strain on your bones and muscles.  What follow-up care do I need? -- Your doctor or nurse will tell you if you need to make a follow-up appointment. If so, make sure that you know when and where to go.  When should I call the doctor? -- Call for advice if:   You have a fever of 100.4°F (38°C) or higher, or chills.   Your pain is not relieved by non-prescription medicines.   You have trouble doing your daily tasks.   You are still having pain in 2 weeks.  All topics are updated as new evidence becomes available and our peer review process is complete.  This topic retrieved from [employer] on: Mar 13, 2024.  Topic 335107 Version 1.0  Release: 32.2.4 - C32.71  © 2024 [employer] and/or its affiliates. All rights reserved.  Consumer Information Use and Disclaimer   Disclaimer: This generalized information is a limited summary of diagnosis, treatment, and/or medication information. It is not meant to be comprehensive and should be used as a tool to help the user understand and/or assess potential diagnostic and treatment options. It does NOT include all information about conditions, treatments, medications, side effects, or risks that may  apply to a specific patient. It is not intended to be medical advice or a substitute for the medical advice, diagnosis, or treatment of a health care provider based on the health care provider's examination and assessment of a patient's specific and unique circumstances. Patients must speak with a health care provider for complete information about their health, medical questions, and treatment options, including any risks or benefits regarding use of medications. This information does not endorse any treatments or medications as safe, effective, or approved for treating a specific patient. [employer] and its affiliates disclaim any warranty or liability relating to this information or the use thereof.The use of this information is governed by the Terms of Use, available at https://www.[employer].com/en/know/clinical-effectiveness-terms. 2024© [employer] and its affiliates and/or licensors. All rights reserved.  Copyright   © 2024 [employer] and/or its affiliates. All rights reserved.